# Patient Record
Sex: MALE | Race: BLACK OR AFRICAN AMERICAN | NOT HISPANIC OR LATINO | Employment: OTHER | URBAN - METROPOLITAN AREA
[De-identification: names, ages, dates, MRNs, and addresses within clinical notes are randomized per-mention and may not be internally consistent; named-entity substitution may affect disease eponyms.]

---

## 2023-01-01 ENCOUNTER — HOSPITAL ENCOUNTER (INPATIENT)
Facility: HOSPITAL | Age: 73
LOS: 5 days | Discharge: HOME | DRG: 919 | End: 2023-11-13
Attending: GENERAL PRACTICE | Admitting: INTERNAL MEDICINE
Payer: MEDICARE

## 2023-01-01 ENCOUNTER — OFFICE VISIT (OUTPATIENT)
Dept: PRIMARY CARE | Facility: CLINIC | Age: 73
End: 2023-01-01
Payer: MEDICARE

## 2023-01-01 ENCOUNTER — OFFICE VISIT (OUTPATIENT)
Dept: CARDIOLOGY | Facility: CLINIC | Age: 73
End: 2023-01-01
Payer: MEDICARE

## 2023-01-01 ENCOUNTER — PATIENT OUTREACH (OUTPATIENT)
Dept: PRIMARY CARE | Facility: CLINIC | Age: 73
End: 2023-01-01
Payer: MEDICARE

## 2023-01-01 ENCOUNTER — APPOINTMENT (OUTPATIENT)
Dept: PRIMARY CARE | Facility: CLINIC | Age: 73
End: 2023-01-01
Payer: MEDICARE

## 2023-01-01 ENCOUNTER — PATIENT OUTREACH (OUTPATIENT)
Dept: CARE COORDINATION | Facility: CLINIC | Age: 73
End: 2023-01-01
Payer: MEDICARE

## 2023-01-01 ENCOUNTER — LAB (OUTPATIENT)
Dept: LAB | Facility: LAB | Age: 73
End: 2023-01-01
Payer: MEDICARE

## 2023-01-01 ENCOUNTER — HOSPITAL ENCOUNTER (INPATIENT)
Facility: HOSPITAL | Age: 73
LOS: 4 days | DRG: 207 | End: 2023-11-27
Attending: EMERGENCY MEDICINE | Admitting: SURGERY
Payer: MEDICARE

## 2023-01-01 ENCOUNTER — APPOINTMENT (OUTPATIENT)
Dept: RADIOLOGY | Facility: HOSPITAL | Age: 73
DRG: 207 | End: 2023-01-01
Payer: MEDICARE

## 2023-01-01 ENCOUNTER — APPOINTMENT (OUTPATIENT)
Dept: CARDIOLOGY | Facility: CLINIC | Age: 73
End: 2023-01-01
Payer: MEDICARE

## 2023-01-01 ENCOUNTER — DOCUMENTATION (OUTPATIENT)
Dept: PRIMARY CARE | Facility: CLINIC | Age: 73
End: 2023-01-01
Payer: MEDICARE

## 2023-01-01 ENCOUNTER — APPOINTMENT (OUTPATIENT)
Dept: CARDIOLOGY | Facility: HOSPITAL | Age: 73
DRG: 919 | End: 2023-01-01
Payer: MEDICARE

## 2023-01-01 ENCOUNTER — TELEPHONE (OUTPATIENT)
Dept: PRIMARY CARE | Facility: CLINIC | Age: 73
End: 2023-01-01
Payer: MEDICARE

## 2023-01-01 ENCOUNTER — HOSPITAL ENCOUNTER (OUTPATIENT)
Dept: CARDIOLOGY | Facility: HOSPITAL | Age: 73
Discharge: HOME | End: 2023-11-17
Payer: MEDICARE

## 2023-01-01 ENCOUNTER — APPOINTMENT (OUTPATIENT)
Dept: RADIOLOGY | Facility: HOSPITAL | Age: 73
DRG: 919 | End: 2023-01-01
Payer: MEDICARE

## 2023-01-01 ENCOUNTER — HOSPITAL ENCOUNTER (OUTPATIENT)
Dept: CARDIOLOGY | Facility: CLINIC | Age: 73
Discharge: HOME | End: 2023-11-26
Payer: MEDICARE

## 2023-01-01 VITALS
OXYGEN SATURATION: 98 % | HEIGHT: 66 IN | WEIGHT: 203.26 LBS | RESPIRATION RATE: 20 BRPM | TEMPERATURE: 97.7 F | HEART RATE: 64 BPM | SYSTOLIC BLOOD PRESSURE: 140 MMHG | DIASTOLIC BLOOD PRESSURE: 47 MMHG | BODY MASS INDEX: 32.67 KG/M2

## 2023-01-01 VITALS
RESPIRATION RATE: 14 BRPM | BODY MASS INDEX: 31.62 KG/M2 | DIASTOLIC BLOOD PRESSURE: 51 MMHG | SYSTOLIC BLOOD PRESSURE: 127 MMHG | TEMPERATURE: 98.6 F | WEIGHT: 190.04 LBS | HEART RATE: 59 BPM | OXYGEN SATURATION: 71 %

## 2023-01-01 VITALS
OXYGEN SATURATION: 96 % | HEIGHT: 65 IN | BODY MASS INDEX: 32.09 KG/M2 | WEIGHT: 192.6 LBS | HEART RATE: 72 BPM | SYSTOLIC BLOOD PRESSURE: 127 MMHG | DIASTOLIC BLOOD PRESSURE: 54 MMHG | RESPIRATION RATE: 16 BRPM

## 2023-01-01 VITALS
SYSTOLIC BLOOD PRESSURE: 138 MMHG | HEART RATE: 87 BPM | HEIGHT: 65 IN | OXYGEN SATURATION: 96 % | DIASTOLIC BLOOD PRESSURE: 54 MMHG | BODY MASS INDEX: 30.82 KG/M2 | TEMPERATURE: 97.3 F | WEIGHT: 185 LBS | RESPIRATION RATE: 16 BRPM

## 2023-01-01 VITALS
SYSTOLIC BLOOD PRESSURE: 122 MMHG | HEART RATE: 82 BPM | RESPIRATION RATE: 16 BRPM | WEIGHT: 199.2 LBS | OXYGEN SATURATION: 91 % | HEIGHT: 65 IN | DIASTOLIC BLOOD PRESSURE: 64 MMHG | BODY MASS INDEX: 33.19 KG/M2

## 2023-01-01 VITALS
RESPIRATION RATE: 18 BRPM | HEART RATE: 93 BPM | DIASTOLIC BLOOD PRESSURE: 55 MMHG | BODY MASS INDEX: 30.16 KG/M2 | OXYGEN SATURATION: 92 % | WEIGHT: 181 LBS | TEMPERATURE: 97.9 F | HEIGHT: 65 IN | SYSTOLIC BLOOD PRESSURE: 129 MMHG

## 2023-01-01 VITALS
HEART RATE: 72 BPM | DIASTOLIC BLOOD PRESSURE: 62 MMHG | SYSTOLIC BLOOD PRESSURE: 110 MMHG | OXYGEN SATURATION: 94 % | BODY MASS INDEX: 32.65 KG/M2 | HEIGHT: 65 IN | WEIGHT: 196 LBS

## 2023-01-01 DIAGNOSIS — I44.1 SECOND DEGREE AV BLOCK: ICD-10-CM

## 2023-01-01 DIAGNOSIS — N40.0 BENIGN PROSTATIC HYPERPLASIA, UNSPECIFIED WHETHER LOWER URINARY TRACT SYMPTOMS PRESENT: ICD-10-CM

## 2023-01-01 DIAGNOSIS — I46.9 CARDIAC ARREST (MULTI): Primary | ICD-10-CM

## 2023-01-01 DIAGNOSIS — Z12.5 PROSTATE CANCER SCREENING: ICD-10-CM

## 2023-01-01 DIAGNOSIS — J81.1: ICD-10-CM

## 2023-01-01 DIAGNOSIS — N18.5 CKD STAGE 5 SECONDARY TO HYPERTENSION (MULTI): ICD-10-CM

## 2023-01-01 DIAGNOSIS — I50.32 CHRONIC DIASTOLIC HEART FAILURE (MULTI): Primary | ICD-10-CM

## 2023-01-01 DIAGNOSIS — E78.5 DYSLIPIDEMIA (HIGH LDL; LOW HDL): ICD-10-CM

## 2023-01-01 DIAGNOSIS — D64.9 ANEMIA, UNSPECIFIED TYPE: ICD-10-CM

## 2023-01-01 DIAGNOSIS — J81.0 ACUTE PULMONARY EDEMA (MULTI): ICD-10-CM

## 2023-01-01 DIAGNOSIS — I21.4 NSTEMI (NON-ST ELEVATED MYOCARDIAL INFARCTION) (MULTI): ICD-10-CM

## 2023-01-01 DIAGNOSIS — M10.9 GOUT, UNSPECIFIED CAUSE, UNSPECIFIED CHRONICITY, UNSPECIFIED SITE: ICD-10-CM

## 2023-01-01 DIAGNOSIS — I20.0 ANGINA PECTORIS, UNSTABLE (MULTI): Primary | ICD-10-CM

## 2023-01-01 DIAGNOSIS — I12.0 CKD STAGE 5 SECONDARY TO HYPERTENSION (MULTI): ICD-10-CM

## 2023-01-01 DIAGNOSIS — Z09 HOSPITAL DISCHARGE FOLLOW-UP: Primary | ICD-10-CM

## 2023-01-01 DIAGNOSIS — I50.43 CHF (CONGESTIVE HEART FAILURE), NYHA CLASS I, ACUTE ON CHRONIC, COMBINED (MULTI): ICD-10-CM

## 2023-01-01 DIAGNOSIS — Z00.00 MEDICARE ANNUAL WELLNESS VISIT, SUBSEQUENT: Primary | ICD-10-CM

## 2023-01-01 DIAGNOSIS — R06.02 SHORTNESS OF BREATH: ICD-10-CM

## 2023-01-01 DIAGNOSIS — H04.123 DRY EYES: Primary | ICD-10-CM

## 2023-01-01 DIAGNOSIS — R09.2 RESPIRATORY ARREST (MULTI): ICD-10-CM

## 2023-01-01 DIAGNOSIS — I35.1 AORTIC VALVE INSUFFICIENCY, ETIOLOGY OF CARDIAC VALVE DISEASE UNSPECIFIED: ICD-10-CM

## 2023-01-01 LAB
ALBUMIN SERPL BCP-MCNC: 2.4 G/DL (ref 3.4–5)
ALBUMIN SERPL BCP-MCNC: 2.6 G/DL (ref 3.4–5)
ALBUMIN SERPL BCP-MCNC: 2.7 G/DL (ref 3.4–5)
ALBUMIN SERPL BCP-MCNC: 3 G/DL (ref 3.4–5)
ALBUMIN SERPL BCP-MCNC: 3.1 G/DL (ref 3.4–5)
ALP SERPL-CCNC: 106 U/L (ref 33–136)
ALP SERPL-CCNC: 70 U/L (ref 33–136)
ALT SERPL W P-5'-P-CCNC: 11 U/L (ref 10–52)
ALT SERPL W P-5'-P-CCNC: 111 U/L (ref 10–52)
ANION GAP BLDA CALCULATED.4IONS-SCNC: 11 MMO/L (ref 10–25)
ANION GAP BLDA CALCULATED.4IONS-SCNC: 13 MMO/L (ref 10–25)
ANION GAP BLDA CALCULATED.4IONS-SCNC: 13 MMO/L (ref 10–25)
ANION GAP BLDA CALCULATED.4IONS-SCNC: 19 MMO/L (ref 10–25)
ANION GAP BLDV CALCULATED.4IONS-SCNC: 16 MMOL/L (ref 10–25)
ANION GAP BLDV CALCULATED.4IONS-SCNC: 17 MMOL/L (ref 10–25)
ANION GAP IN SER/PLAS: 21 MMOL/L (ref 10–20)
ANION GAP SERPL CALC-SCNC: 12 MMOL/L (ref 10–20)
ANION GAP SERPL CALC-SCNC: 15 MMOL/L (ref 10–20)
ANION GAP SERPL CALC-SCNC: 15 MMOL/L (ref 10–20)
ANION GAP SERPL CALC-SCNC: 17 MMOL/L (ref 10–20)
ANION GAP SERPL CALC-SCNC: 18 MMOL/L (ref 10–20)
ANION GAP SERPL CALC-SCNC: 18 MMOL/L (ref 10–20)
ANION GAP SERPL CALC-SCNC: 19 MMOL/L (ref 10–20)
ANION GAP SERPL CALC-SCNC: 20 MMOL/L (ref 10–20)
ANION GAP SERPL CALC-SCNC: 20 MMOL/L (ref 10–20)
ANION GAP SERPL CALC-SCNC: 21 MMOL/L (ref 10–20)
ANION GAP SERPL CALC-SCNC: 21 MMOL/L (ref 10–20)
ANION GAP SERPL CALC-SCNC: 27 MMOL/L (ref 10–20)
AST SERPL W P-5'-P-CCNC: 181 U/L (ref 9–39)
AST SERPL W P-5'-P-CCNC: 22 U/L (ref 9–39)
ATRIAL RATE: 81 BPM
BASE EXCESS BLDA CALC-SCNC: -0.8 MMOL/L (ref -2–3)
BASE EXCESS BLDA CALC-SCNC: -1 MMOL/L (ref -2–3)
BASE EXCESS BLDA CALC-SCNC: -5.3 MMOL/L (ref -2–3)
BASE EXCESS BLDA CALC-SCNC: 2.8 MMOL/L (ref -2–3)
BASE EXCESS BLDV CALC-SCNC: -2.7 MMOL/L (ref -2–3)
BASE EXCESS BLDV CALC-SCNC: -3.9 MMOL/L (ref -2–3)
BASOPHILS # BLD AUTO: 0.01 X10*3/UL (ref 0–0.1)
BASOPHILS # BLD AUTO: 0.01 X10*3/UL (ref 0–0.1)
BASOPHILS # BLD AUTO: 0.04 X10*3/UL (ref 0–0.1)
BASOPHILS # BLD AUTO: 0.05 X10*3/UL (ref 0–0.1)
BASOPHILS # BLD MANUAL: 0 X10*3/UL (ref 0–0.1)
BASOPHILS NFR BLD AUTO: 0.1 %
BASOPHILS NFR BLD AUTO: 0.1 %
BASOPHILS NFR BLD AUTO: 0.5 %
BASOPHILS NFR BLD AUTO: 0.7 %
BASOPHILS NFR BLD MANUAL: 0 %
BILIRUB DIRECT SERPL-MCNC: 0 MG/DL (ref 0–0.3)
BILIRUB SERPL-MCNC: 0.3 MG/DL (ref 0–1.2)
BILIRUB SERPL-MCNC: 0.3 MG/DL (ref 0–1.2)
BNP SERPL-MCNC: 1067 PG/ML (ref 0–99)
BNP SERPL-MCNC: 2327 PG/ML (ref 0–99)
BODY SURFACE AREA: 1.84 M2
BODY TEMPERATURE: 37 DEGREES CELSIUS
BUN SERPL-MCNC: 26 MG/DL (ref 6–23)
BUN SERPL-MCNC: 32 MG/DL (ref 6–23)
BUN SERPL-MCNC: 47 MG/DL (ref 6–23)
BUN SERPL-MCNC: 49 MG/DL (ref 6–23)
BUN SERPL-MCNC: 49 MG/DL (ref 6–23)
BUN SERPL-MCNC: 58 MG/DL (ref 6–23)
BUN SERPL-MCNC: 65 MG/DL (ref 6–23)
BUN SERPL-MCNC: 66 MG/DL (ref 6–23)
BUN SERPL-MCNC: 72 MG/DL (ref 6–23)
BUN SERPL-MCNC: 73 MG/DL (ref 6–23)
BUN SERPL-MCNC: 75 MG/DL (ref 6–23)
BUN SERPL-MCNC: 77 MG/DL (ref 6–23)
BUN SERPL-MCNC: 81 MG/DL (ref 6–23)
C DIF TOX TCDA+TCDB STL QL NAA+PROBE: NOT DETECTED
CA-I BLD-SCNC: 0.95 MMOL/L (ref 1.1–1.33)
CA-I BLD-SCNC: 0.96 MMOL/L (ref 1.1–1.33)
CA-I BLD-SCNC: 1.12 MMOL/L (ref 1.1–1.33)
CA-I BLD-SCNC: 1.18 MMOL/L (ref 1.1–1.33)
CA-I BLD-SCNC: 1.22 MMOL/L (ref 1.1–1.33)
CA-I BLDA-SCNC: 0.96 MMOL/L (ref 1.1–1.33)
CA-I BLDA-SCNC: 0.98 MMOL/L (ref 1.1–1.33)
CA-I BLDA-SCNC: 1 MMOL/L (ref 1.1–1.33)
CA-I BLDA-SCNC: 1.03 MMOL/L (ref 1.1–1.33)
CA-I BLDV-SCNC: 0.97 MMOL/L (ref 1.1–1.33)
CA-I BLDV-SCNC: 1 MMOL/L (ref 1.1–1.33)
CALCIUM (MG/DL) IN SER/PLAS: 8 MG/DL (ref 8.6–10.6)
CALCIUM SERPL-MCNC: 7.1 MG/DL (ref 8.6–10.3)
CALCIUM SERPL-MCNC: 7.3 MG/DL (ref 8.6–10.3)
CALCIUM SERPL-MCNC: 7.3 MG/DL (ref 8.6–10.3)
CALCIUM SERPL-MCNC: 7.5 MG/DL (ref 8.6–10.3)
CALCIUM SERPL-MCNC: 7.5 MG/DL (ref 8.6–10.3)
CALCIUM SERPL-MCNC: 7.7 MG/DL (ref 8.6–10.3)
CALCIUM SERPL-MCNC: 7.7 MG/DL (ref 8.6–10.3)
CALCIUM SERPL-MCNC: 7.8 MG/DL (ref 8.6–10.3)
CALCIUM SERPL-MCNC: 8.4 MG/DL (ref 8.6–10.3)
CARBON DIOXIDE, TOTAL (MMOL/L) IN SER/PLAS: 25 MMOL/L (ref 21–32)
CARDIAC TROPONIN I PNL SERPL HS: 110 NG/L (ref 0–20)
CARDIAC TROPONIN I PNL SERPL HS: 312 NG/L (ref 0–20)
CARDIAC TROPONIN I PNL SERPL HS: 444 NG/L (ref 0–20)
CARDIAC TROPONIN I PNL SERPL HS: 449 NG/L (ref 0–20)
CARDIAC TROPONIN I PNL SERPL HS: 472 NG/L (ref 0–20)
CARDIAC TROPONIN I PNL SERPL HS: 584 NG/L (ref 0–20)
CHLORIDE (MMOL/L) IN SER/PLAS: 100 MMOL/L (ref 98–107)
CHLORIDE BLDA-SCNC: 100 MMOL/L (ref 98–107)
CHLORIDE BLDA-SCNC: 102 MMOL/L (ref 98–107)
CHLORIDE BLDA-SCNC: 102 MMOL/L (ref 98–107)
CHLORIDE BLDA-SCNC: 99 MMOL/L (ref 98–107)
CHLORIDE BLDV-SCNC: 100 MMOL/L (ref 98–107)
CHLORIDE BLDV-SCNC: 103 MMOL/L (ref 98–107)
CHLORIDE SERPL-SCNC: 100 MMOL/L (ref 98–107)
CHLORIDE SERPL-SCNC: 100 MMOL/L (ref 98–107)
CHLORIDE SERPL-SCNC: 96 MMOL/L (ref 98–107)
CHLORIDE SERPL-SCNC: 97 MMOL/L (ref 98–107)
CHLORIDE SERPL-SCNC: 98 MMOL/L (ref 98–107)
CHLORIDE SERPL-SCNC: 99 MMOL/L (ref 98–107)
CHLORIDE SERPL-SCNC: 99 MMOL/L (ref 98–107)
CHOLESTEROL (MG/DL) IN SER/PLAS: 235 MG/DL (ref 0–199)
CHOLESTEROL IN HDL (MG/DL) IN SER/PLAS: 52 MG/DL
CHOLESTEROL/HDL RATIO: 4.5
CO2 SERPL-SCNC: 22 MMOL/L (ref 21–32)
CO2 SERPL-SCNC: 24 MMOL/L (ref 21–32)
CO2 SERPL-SCNC: 25 MMOL/L (ref 21–32)
CO2 SERPL-SCNC: 26 MMOL/L (ref 21–32)
CO2 SERPL-SCNC: 30 MMOL/L (ref 21–32)
CO2 SERPL-SCNC: 31 MMOL/L (ref 21–32)
CREAT SERPL-MCNC: 10.25 MG/DL (ref 0.5–1.3)
CREAT SERPL-MCNC: 11.45 MG/DL (ref 0.5–1.3)
CREAT SERPL-MCNC: 11.62 MG/DL (ref 0.5–1.3)
CREAT SERPL-MCNC: 12.14 MG/DL (ref 0.5–1.3)
CREAT SERPL-MCNC: 12.26 MG/DL (ref 0.5–1.3)
CREAT SERPL-MCNC: 13.02 MG/DL (ref 0.5–1.3)
CREAT SERPL-MCNC: 13.43 MG/DL (ref 0.5–1.3)
CREAT SERPL-MCNC: 15.29 MG/DL (ref 0.5–1.3)
CREAT SERPL-MCNC: 17.77 MG/DL (ref 0.5–1.3)
CREAT SERPL-MCNC: 18.05 MG/DL (ref 0.5–1.3)
CREAT SERPL-MCNC: 4.49 MG/DL (ref 0.5–1.3)
CREAT SERPL-MCNC: 4.9 MG/DL (ref 0.5–1.3)
CREAT SERPL-MCNC: 6.42 MG/DL (ref 0.5–1.3)
CREATININE (MG/DL) IN SER/PLAS: 13.67 MG/DL (ref 0.5–1.3)
D DIMER PPP FEU-MCNC: 3685 NG/ML FEU
DACRYOCYTES BLD QL SMEAR: ABNORMAL
EOSINOPHIL # BLD AUTO: 0.01 X10*3/UL (ref 0–0.4)
EOSINOPHIL # BLD AUTO: 0.05 X10*3/UL (ref 0–0.4)
EOSINOPHIL # BLD AUTO: 0.15 X10*3/UL (ref 0–0.4)
EOSINOPHIL # BLD AUTO: 0.17 X10*3/UL (ref 0–0.4)
EOSINOPHIL # BLD MANUAL: 0 X10*3/UL (ref 0–0.4)
EOSINOPHIL NFR BLD AUTO: 0.1 %
EOSINOPHIL NFR BLD AUTO: 0.7 %
EOSINOPHIL NFR BLD AUTO: 1.9 %
EOSINOPHIL NFR BLD AUTO: 2.4 %
EOSINOPHIL NFR BLD MANUAL: 0 %
ERYTHROCYTE DISTRIBUTION WIDTH (RATIO) BY AUTOMATED COUNT: 14.6 % (ref 11.5–14.5)
ERYTHROCYTE MEAN CORPUSCULAR HEMOGLOBIN CONCENTRATION (G/DL) BY AUTOMATED: 31.3 G/DL (ref 32–36)
ERYTHROCYTE MEAN CORPUSCULAR VOLUME (FL) BY AUTOMATED COUNT: 98 FL (ref 80–100)
ERYTHROCYTE [DISTWIDTH] IN BLOOD BY AUTOMATED COUNT: 14.5 % (ref 11.5–14.5)
ERYTHROCYTE [DISTWIDTH] IN BLOOD BY AUTOMATED COUNT: 14.5 % (ref 11.5–14.5)
ERYTHROCYTE [DISTWIDTH] IN BLOOD BY AUTOMATED COUNT: 14.6 % (ref 11.5–14.5)
ERYTHROCYTE [DISTWIDTH] IN BLOOD BY AUTOMATED COUNT: 14.7 % (ref 11.5–14.5)
ERYTHROCYTE [DISTWIDTH] IN BLOOD BY AUTOMATED COUNT: 14.8 % (ref 11.5–14.5)
ERYTHROCYTE [DISTWIDTH] IN BLOOD BY AUTOMATED COUNT: 14.8 % (ref 11.5–14.5)
ERYTHROCYTE [DISTWIDTH] IN BLOOD BY AUTOMATED COUNT: 14.9 % (ref 11.5–14.5)
ERYTHROCYTE [DISTWIDTH] IN BLOOD BY AUTOMATED COUNT: 15.7 % (ref 11.5–14.5)
ERYTHROCYTE [DISTWIDTH] IN BLOOD BY AUTOMATED COUNT: 15.8 % (ref 11.5–14.5)
ERYTHROCYTE [DISTWIDTH] IN BLOOD BY AUTOMATED COUNT: 16 % (ref 11.5–14.5)
ERYTHROCYTE [DISTWIDTH] IN BLOOD BY AUTOMATED COUNT: 16.2 % (ref 11.5–14.5)
ERYTHROCYTE [DISTWIDTH] IN BLOOD BY AUTOMATED COUNT: 16.6 % (ref 11.5–14.5)
ERYTHROCYTES (10*6/UL) IN BLOOD BY AUTOMATED COUNT: 2.77 X10E12/L (ref 4.5–5.9)
FLUAV RNA RESP QL NAA+PROBE: NOT DETECTED
FLUBV RNA RESP QL NAA+PROBE: NOT DETECTED
GFR MALE: 3 ML/MIN/1.73M2
GFR SERPL CREATININE-BSD FRML MDRD: 12 ML/MIN/1.73M*2
GFR SERPL CREATININE-BSD FRML MDRD: 13 ML/MIN/1.73M*2
GFR SERPL CREATININE-BSD FRML MDRD: 2 ML/MIN/1.73M*2
GFR SERPL CREATININE-BSD FRML MDRD: 2 ML/MIN/1.73M*2
GFR SERPL CREATININE-BSD FRML MDRD: 3 ML/MIN/1.73M*2
GFR SERPL CREATININE-BSD FRML MDRD: 3 ML/MIN/1.73M*2
GFR SERPL CREATININE-BSD FRML MDRD: 4 ML/MIN/1.73M*2
GFR SERPL CREATININE-BSD FRML MDRD: 5 ML/MIN/1.73M*2
GFR SERPL CREATININE-BSD FRML MDRD: 8 ML/MIN/1.73M*2
GLUCOSE (MG/DL) IN SER/PLAS: 83 MG/DL (ref 74–99)
GLUCOSE BLD MANUAL STRIP-MCNC: 128 MG/DL (ref 74–99)
GLUCOSE BLD MANUAL STRIP-MCNC: 139 MG/DL (ref 74–99)
GLUCOSE BLD MANUAL STRIP-MCNC: 141 MG/DL (ref 74–99)
GLUCOSE BLD MANUAL STRIP-MCNC: 141 MG/DL (ref 74–99)
GLUCOSE BLD MANUAL STRIP-MCNC: 147 MG/DL (ref 74–99)
GLUCOSE BLD MANUAL STRIP-MCNC: 61 MG/DL (ref 74–99)
GLUCOSE BLD MANUAL STRIP-MCNC: 71 MG/DL (ref 74–99)
GLUCOSE BLD MANUAL STRIP-MCNC: 73 MG/DL (ref 74–99)
GLUCOSE BLD MANUAL STRIP-MCNC: 74 MG/DL (ref 74–99)
GLUCOSE BLD MANUAL STRIP-MCNC: 78 MG/DL (ref 74–99)
GLUCOSE BLD MANUAL STRIP-MCNC: 79 MG/DL (ref 74–99)
GLUCOSE BLD MANUAL STRIP-MCNC: 80 MG/DL (ref 74–99)
GLUCOSE BLD MANUAL STRIP-MCNC: 82 MG/DL (ref 74–99)
GLUCOSE BLD MANUAL STRIP-MCNC: 83 MG/DL (ref 74–99)
GLUCOSE BLD MANUAL STRIP-MCNC: 84 MG/DL (ref 74–99)
GLUCOSE BLD MANUAL STRIP-MCNC: 84 MG/DL (ref 74–99)
GLUCOSE BLD MANUAL STRIP-MCNC: 86 MG/DL (ref 74–99)
GLUCOSE BLD MANUAL STRIP-MCNC: 87 MG/DL (ref 74–99)
GLUCOSE BLD MANUAL STRIP-MCNC: 87 MG/DL (ref 74–99)
GLUCOSE BLD MANUAL STRIP-MCNC: 88 MG/DL (ref 74–99)
GLUCOSE BLD MANUAL STRIP-MCNC: 90 MG/DL (ref 74–99)
GLUCOSE BLD MANUAL STRIP-MCNC: 95 MG/DL (ref 74–99)
GLUCOSE BLD MANUAL STRIP-MCNC: 96 MG/DL (ref 74–99)
GLUCOSE BLDA-MCNC: 103 MG/DL (ref 74–99)
GLUCOSE BLDA-MCNC: 143 MG/DL (ref 74–99)
GLUCOSE BLDA-MCNC: 227 MG/DL (ref 74–99)
GLUCOSE BLDA-MCNC: 73 MG/DL (ref 74–99)
GLUCOSE BLDV-MCNC: 126 MG/DL (ref 74–99)
GLUCOSE BLDV-MCNC: 176 MG/DL (ref 74–99)
GLUCOSE SERPL-MCNC: 105 MG/DL (ref 74–99)
GLUCOSE SERPL-MCNC: 109 MG/DL (ref 74–99)
GLUCOSE SERPL-MCNC: 123 MG/DL (ref 74–99)
GLUCOSE SERPL-MCNC: 158 MG/DL (ref 74–99)
GLUCOSE SERPL-MCNC: 80 MG/DL (ref 74–99)
GLUCOSE SERPL-MCNC: 90 MG/DL (ref 74–99)
GLUCOSE SERPL-MCNC: 91 MG/DL (ref 74–99)
GLUCOSE SERPL-MCNC: 92 MG/DL (ref 74–99)
GLUCOSE SERPL-MCNC: 95 MG/DL (ref 74–99)
HCO3 BLDA-SCNC: 21.6 MMOL/L (ref 22–26)
HCO3 BLDA-SCNC: 24.3 MMOL/L (ref 22–26)
HCO3 BLDA-SCNC: 27.2 MMOL/L (ref 22–26)
HCO3 BLDA-SCNC: 28.9 MMOL/L (ref 22–26)
HCO3 BLDV-SCNC: 24.4 MMOL/L (ref 22–26)
HCO3 BLDV-SCNC: 24.6 MMOL/L (ref 22–26)
HCT VFR BLD AUTO: 24.9 % (ref 41–52)
HCT VFR BLD AUTO: 25.3 % (ref 41–52)
HCT VFR BLD AUTO: 25.3 % (ref 41–52)
HCT VFR BLD AUTO: 25.6 % (ref 41–52)
HCT VFR BLD AUTO: 26.6 % (ref 41–52)
HCT VFR BLD AUTO: 26.7 % (ref 41–52)
HCT VFR BLD AUTO: 26.9 % (ref 41–52)
HCT VFR BLD AUTO: 27.9 % (ref 41–52)
HCT VFR BLD AUTO: 28.1 % (ref 41–52)
HCT VFR BLD AUTO: 29.8 % (ref 41–52)
HCT VFR BLD AUTO: 30 % (ref 41–52)
HCT VFR BLD AUTO: 30.5 % (ref 41–52)
HCT VFR BLD EST: 24 % (ref 41–52)
HCT VFR BLD EST: 25 % (ref 41–52)
HCT VFR BLD EST: 27 % (ref 41–52)
HCT VFR BLD EST: 28 % (ref 41–52)
HCT VFR BLD EST: 29 % (ref 41–52)
HCT VFR BLD EST: 34 % (ref 41–52)
HEMATOCRIT (%) IN BLOOD BY AUTOMATED COUNT: 27.2 % (ref 41–52)
HEMOGLOBIN (G/DL) IN BLOOD: 8.5 G/DL (ref 13.5–17.5)
HGB BLD-MCNC: 7.9 G/DL (ref 13.5–17.5)
HGB BLD-MCNC: 7.9 G/DL (ref 13.5–17.5)
HGB BLD-MCNC: 8.2 G/DL (ref 13.5–17.5)
HGB BLD-MCNC: 8.2 G/DL (ref 13.5–17.5)
HGB BLD-MCNC: 8.3 G/DL (ref 13.5–17.5)
HGB BLD-MCNC: 8.3 G/DL (ref 13.5–17.5)
HGB BLD-MCNC: 8.6 G/DL (ref 13.5–17.5)
HGB BLD-MCNC: 8.8 G/DL (ref 13.5–17.5)
HGB BLD-MCNC: 9.1 G/DL (ref 13.5–17.5)
HGB BLD-MCNC: 9.5 G/DL (ref 13.5–17.5)
HGB BLDA-MCNC: 11.4 G/DL (ref 13.5–17.5)
HGB BLDA-MCNC: 8.4 G/DL (ref 13.5–17.5)
HGB BLDA-MCNC: 8.9 G/DL (ref 13.5–17.5)
HGB BLDA-MCNC: 9.2 G/DL (ref 13.5–17.5)
HGB BLDV-MCNC: 8 G/DL (ref 13.5–17.5)
HGB BLDV-MCNC: 9.8 G/DL (ref 13.5–17.5)
HOLD SPECIMEN: NORMAL
HOLD SPECIMEN: NORMAL
IMM GRANULOCYTES # BLD AUTO: 0.02 X10*3/UL (ref 0–0.5)
IMM GRANULOCYTES # BLD AUTO: 0.03 X10*3/UL (ref 0–0.5)
IMM GRANULOCYTES # BLD AUTO: 0.15 X10*3/UL (ref 0–0.5)
IMM GRANULOCYTES NFR BLD AUTO: 0.3 % (ref 0–0.9)
IMM GRANULOCYTES NFR BLD AUTO: 0.4 % (ref 0–0.9)
IMM GRANULOCYTES NFR BLD AUTO: 0.4 % (ref 0–0.9)
IMM GRANULOCYTES NFR BLD AUTO: 0.5 % (ref 0–0.9)
IMM GRANULOCYTES NFR BLD AUTO: 2.1 % (ref 0–0.9)
INHALED O2 CONCENTRATION: 100 %
INHALED O2 CONCENTRATION: 100 %
INHALED O2 CONCENTRATION: 21 %
INHALED O2 CONCENTRATION: 60 %
INHALED O2 CONCENTRATION: 80 %
INHALED O2 CONCENTRATION: 80 %
INR PPP: 1.2 (ref 0.9–1.1)
LACTATE BLDA-SCNC: 1.4 MMOL/L (ref 0.4–2)
LACTATE BLDA-SCNC: 10.9 MMOL/L (ref 0.4–2)
LACTATE BLDA-SCNC: 2.5 MMOL/L (ref 0.4–2)
LACTATE BLDA-SCNC: 2.7 MMOL/L (ref 0.4–2)
LACTATE BLDV-SCNC: 1.2 MMOL/L (ref 0.4–2)
LACTATE BLDV-SCNC: 6.5 MMOL/L (ref 0.4–2)
LACTATE BLDV-SCNC: 6.5 MMOL/L (ref 0.4–2)
LACTATE BLDV-SCNC: 8.9 MMOL/L (ref 0.4–2)
LACTATE SERPL-SCNC: 7.3 MMOL/L (ref 0.4–2)
LDL: 156 MG/DL (ref 0–99)
LEUKOCYTES (10*3/UL) IN BLOOD BY AUTOMATED COUNT: 6.8 X10E9/L (ref 4.4–11.3)
LYMPHOCYTES # BLD AUTO: 0.5 X10*3/UL (ref 0.8–3)
LYMPHOCYTES # BLD AUTO: 0.5 X10*3/UL (ref 0.8–3)
LYMPHOCYTES # BLD AUTO: 0.99 X10*3/UL (ref 0.8–3)
LYMPHOCYTES # BLD AUTO: 1.86 X10*3/UL (ref 0.8–3)
LYMPHOCYTES # BLD MANUAL: 0.53 X10*3/UL (ref 0.8–3)
LYMPHOCYTES NFR BLD AUTO: 12.7 %
LYMPHOCYTES NFR BLD AUTO: 26.3 %
LYMPHOCYTES NFR BLD AUTO: 6 %
LYMPHOCYTES NFR BLD AUTO: 7.5 %
LYMPHOCYTES NFR BLD MANUAL: 9 %
MAGNESIUM SERPL-MCNC: 1.8 MG/DL (ref 1.6–2.4)
MCH RBC QN AUTO: 28.5 PG (ref 26–34)
MCH RBC QN AUTO: 28.6 PG (ref 26–34)
MCH RBC QN AUTO: 28.7 PG (ref 26–34)
MCH RBC QN AUTO: 28.7 PG (ref 26–34)
MCH RBC QN AUTO: 28.8 PG (ref 26–34)
MCH RBC QN AUTO: 28.9 PG (ref 26–34)
MCH RBC QN AUTO: 28.9 PG (ref 26–34)
MCH RBC QN AUTO: 29 PG (ref 26–34)
MCH RBC QN AUTO: 29.1 PG (ref 26–34)
MCH RBC QN AUTO: 29.2 PG (ref 26–34)
MCHC RBC AUTO-ENTMCNC: 29.3 G/DL (ref 32–36)
MCHC RBC AUTO-ENTMCNC: 30.5 G/DL (ref 32–36)
MCHC RBC AUTO-ENTMCNC: 30.8 G/DL (ref 32–36)
MCHC RBC AUTO-ENTMCNC: 30.9 G/DL (ref 32–36)
MCHC RBC AUTO-ENTMCNC: 31.1 G/DL (ref 32–36)
MCHC RBC AUTO-ENTMCNC: 31.1 G/DL (ref 32–36)
MCHC RBC AUTO-ENTMCNC: 31.2 G/DL (ref 32–36)
MCHC RBC AUTO-ENTMCNC: 31.2 G/DL (ref 32–36)
MCHC RBC AUTO-ENTMCNC: 31.3 G/DL (ref 32–36)
MCHC RBC AUTO-ENTMCNC: 31.5 G/DL (ref 32–36)
MCHC RBC AUTO-ENTMCNC: 32 G/DL (ref 32–36)
MCHC RBC AUTO-ENTMCNC: 34.5 G/DL (ref 32–36)
MCV RBC AUTO: 84 FL (ref 80–100)
MCV RBC AUTO: 91 FL (ref 80–100)
MCV RBC AUTO: 92 FL (ref 80–100)
MCV RBC AUTO: 92 FL (ref 80–100)
MCV RBC AUTO: 93 FL (ref 80–100)
MCV RBC AUTO: 95 FL (ref 80–100)
MCV RBC AUTO: 99 FL (ref 80–100)
MONOCYTES # BLD AUTO: 0.25 X10*3/UL (ref 0.05–0.8)
MONOCYTES # BLD AUTO: 0.25 X10*3/UL (ref 0.05–0.8)
MONOCYTES # BLD AUTO: 0.38 X10*3/UL (ref 0.05–0.8)
MONOCYTES # BLD AUTO: 0.51 X10*3/UL (ref 0.05–0.8)
MONOCYTES # BLD MANUAL: 0.41 X10*3/UL (ref 0.05–0.8)
MONOCYTES NFR BLD AUTO: 3 %
MONOCYTES NFR BLD AUTO: 3.7 %
MONOCYTES NFR BLD AUTO: 5.4 %
MONOCYTES NFR BLD AUTO: 6.5 %
MONOCYTES NFR BLD MANUAL: 7 %
MRSA DNA SPEC QL NAA+PROBE: NOT DETECTED
NEUTROPHILS # BLD AUTO: 4.47 X10*3/UL (ref 1.6–5.5)
NEUTROPHILS # BLD AUTO: 5.88 X10*3/UL (ref 1.6–5.5)
NEUTROPHILS # BLD AUTO: 6.08 X10*3/UL (ref 1.6–5.5)
NEUTROPHILS # BLD AUTO: 7.6 X10*3/UL (ref 1.6–5.5)
NEUTROPHILS # BLD MANUAL: 4.95 X10*3/UL (ref 1.6–5.5)
NEUTROPHILS NFR BLD AUTO: 63.1 %
NEUTROPHILS NFR BLD AUTO: 78 %
NEUTROPHILS NFR BLD AUTO: 87.7 %
NEUTROPHILS NFR BLD AUTO: 90.4 %
NEUTS BAND # BLD MANUAL: 1.65 X10*3/UL (ref 0–0.5)
NEUTS BAND NFR BLD MANUAL: 28 %
NEUTS SEG # BLD MANUAL: 3.3 X10*3/UL (ref 1.6–5)
NEUTS SEG NFR BLD MANUAL: 56 %
NRBC (PER 100 WBCS) BY AUTOMATED COUNT: 0 /100 WBC (ref 0–0)
NRBC BLD-RTO: 0 /100 WBCS (ref 0–0)
NRBC BLD-RTO: 0.8 /100 WBCS (ref 0–0)
NRBC BLD-RTO: 1.1 /100 WBCS (ref 0–0)
NRBC BLD-RTO: 1.9 /100 WBCS (ref 0–0)
OVALOCYTES BLD QL SMEAR: ABNORMAL
OXYHGB MFR BLDA: 84.4 % (ref 94–98)
OXYHGB MFR BLDA: 92.3 % (ref 94–98)
OXYHGB MFR BLDA: 96.6 % (ref 94–98)
OXYHGB MFR BLDA: 97 % (ref 94–98)
OXYHGB MFR BLDV: 56.3 % (ref 45–75)
OXYHGB MFR BLDV: 71.5 % (ref 45–75)
P AXIS: 72 DEGREES
P OFFSET: 154 MS
P ONSET: 112 MS
PCO2 BLDA: 40 MM HG (ref 38–42)
PCO2 BLDA: 42 MM HG (ref 38–42)
PCO2 BLDA: 47 MM HG (ref 38–42)
PCO2 BLDA: 81 MM HG (ref 38–42)
PCO2 BLDV: 56 MM HG (ref 41–51)
PCO2 BLDV: 61 MM HG (ref 41–51)
PEEP CMH2O: 10 CM H2O
PH BLDA: 7.16 PH (ref 7.38–7.42)
PH BLDA: 7.27 PH (ref 7.38–7.42)
PH BLDA: 7.37 PH (ref 7.38–7.42)
PH BLDA: 7.44 PH (ref 7.38–7.42)
PH BLDV: 7.21 PH (ref 7.33–7.43)
PH BLDV: 7.25 PH (ref 7.33–7.43)
PHOSPHATE SERPL-MCNC: 3.4 MG/DL (ref 2.5–4.9)
PHOSPHATE SERPL-MCNC: 5.5 MG/DL (ref 2.5–4.9)
PHOSPHATE SERPL-MCNC: 6.6 MG/DL (ref 2.5–4.9)
PHOSPHATE SERPL-MCNC: 9.6 MG/DL (ref 2.5–4.9)
PLATELET # BLD AUTO: 105 X10*3/UL (ref 150–450)
PLATELET # BLD AUTO: 132 X10*3/UL (ref 150–450)
PLATELET # BLD AUTO: 143 X10*3/UL (ref 150–450)
PLATELET # BLD AUTO: 148 X10*3/UL (ref 150–450)
PLATELET # BLD AUTO: 159 X10*3/UL (ref 150–450)
PLATELET # BLD AUTO: 176 X10*3/UL (ref 150–450)
PLATELET # BLD AUTO: 186 X10*3/UL (ref 150–450)
PLATELET # BLD AUTO: 197 X10*3/UL (ref 150–450)
PLATELET # BLD AUTO: 200 X10*3/UL (ref 150–450)
PLATELET # BLD AUTO: 206 X10*3/UL (ref 150–450)
PLATELET # BLD AUTO: 209 X10*3/UL (ref 150–450)
PLATELET # BLD AUTO: 213 X10*3/UL (ref 150–450)
PLATELET # BLD AUTO: 240 X10*3/UL (ref 150–450)
PLATELETS (10*3/UL) IN BLOOD AUTOMATED COUNT: 242 X10E9/L (ref 150–450)
PO2 BLDA: 123 MM HG (ref 85–95)
PO2 BLDA: 154 MM HG (ref 85–95)
PO2 BLDA: 57 MM HG (ref 85–95)
PO2 BLDA: 68 MM HG (ref 85–95)
PO2 BLDV: 48 MM HG (ref 35–45)
PO2 BLDV: 52 MM HG (ref 35–45)
POLYCHROMASIA BLD QL SMEAR: ABNORMAL
POTASSIUM (MMOL/L) IN SER/PLAS: 4.8 MMOL/L (ref 3.5–5.3)
POTASSIUM BLDA-SCNC: 3.4 MMOL/L (ref 3.5–5.3)
POTASSIUM BLDA-SCNC: 3.7 MMOL/L (ref 3.5–5.3)
POTASSIUM BLDA-SCNC: 4.8 MMOL/L (ref 3.5–5.3)
POTASSIUM BLDA-SCNC: 5.5 MMOL/L (ref 3.5–5.3)
POTASSIUM BLDV-SCNC: 5.8 MMOL/L (ref 3.5–5.3)
POTASSIUM BLDV-SCNC: 6 MMOL/L (ref 3.5–5.3)
POTASSIUM SERPL-SCNC: 3.4 MMOL/L (ref 3.5–5.3)
POTASSIUM SERPL-SCNC: 3.6 MMOL/L (ref 3.5–5.3)
POTASSIUM SERPL-SCNC: 3.6 MMOL/L (ref 3.5–5.3)
POTASSIUM SERPL-SCNC: 3.7 MMOL/L (ref 3.5–5.3)
POTASSIUM SERPL-SCNC: 3.8 MMOL/L (ref 3.5–5.3)
POTASSIUM SERPL-SCNC: 4 MMOL/L (ref 3.5–5.3)
POTASSIUM SERPL-SCNC: 4.1 MMOL/L (ref 3.5–5.3)
POTASSIUM SERPL-SCNC: 4.1 MMOL/L (ref 3.5–5.3)
POTASSIUM SERPL-SCNC: 4.3 MMOL/L (ref 3.5–5.3)
POTASSIUM SERPL-SCNC: 4.6 MMOL/L (ref 3.5–5.3)
POTASSIUM SERPL-SCNC: 4.8 MMOL/L (ref 3.5–5.3)
POTASSIUM SERPL-SCNC: 5.5 MMOL/L (ref 3.5–5.3)
PR INTERVAL: 168 MS
PROSTATE SPECIFIC ANTIGEN,SCREEN: 2.58 NG/ML (ref 0–4)
PROT SERPL-MCNC: 5.3 G/DL (ref 6.4–8.2)
PROT SERPL-MCNC: 6.2 G/DL (ref 6.4–8.2)
PROTHROMBIN TIME: 13.1 SECONDS (ref 9.8–12.8)
Q ONSET: 196 MS
QRS COUNT: 14 BEATS
QRS DURATION: 194 MS
QT INTERVAL: 512 MS
QTC CALCULATION(BAZETT): 594 MS
QTC FREDERICIA: 565 MS
R AXIS: -62 DEGREES
RBC # BLD AUTO: 2.77 X10*6/UL (ref 4.5–5.9)
RBC # BLD AUTO: 2.77 X10*6/UL (ref 4.5–5.9)
RBC # BLD AUTO: 2.82 X10*6/UL (ref 4.5–5.9)
RBC # BLD AUTO: 2.87 X10*6/UL (ref 4.5–5.9)
RBC # BLD AUTO: 2.88 X10*6/UL (ref 4.5–5.9)
RBC # BLD AUTO: 2.89 X10*6/UL (ref 4.5–5.9)
RBC # BLD AUTO: 2.95 X10*6/UL (ref 4.5–5.9)
RBC # BLD AUTO: 3.03 X10*6/UL (ref 4.5–5.9)
RBC # BLD AUTO: 3.05 X10*6/UL (ref 4.5–5.9)
RBC # BLD AUTO: 3.09 X10*6/UL (ref 4.5–5.9)
RBC # BLD AUTO: 3.15 X10*6/UL (ref 4.5–5.9)
RBC # BLD AUTO: 3.31 X10*6/UL (ref 4.5–5.9)
RBC MORPH BLD: ABNORMAL
SAO2 % BLDA: 86 % (ref 94–100)
SAO2 % BLDA: 94 % (ref 94–100)
SAO2 % BLDA: 98 % (ref 94–100)
SAO2 % BLDA: 98 % (ref 94–100)
SAO2 % BLDV: 57 % (ref 45–75)
SAO2 % BLDV: 73 % (ref 45–75)
SARS-COV-2 RNA RESP QL NAA+PROBE: NOT DETECTED
SODIUM (MMOL/L) IN SER/PLAS: 141 MMOL/L (ref 136–145)
SODIUM BLDA-SCNC: 130 MMOL/L (ref 136–145)
SODIUM BLDA-SCNC: 134 MMOL/L (ref 136–145)
SODIUM BLDA-SCNC: 137 MMOL/L (ref 136–145)
SODIUM BLDA-SCNC: 143 MMOL/L (ref 136–145)
SODIUM BLDV-SCNC: 136 MMOL/L (ref 136–145)
SODIUM BLDV-SCNC: 138 MMOL/L (ref 136–145)
SODIUM SERPL-SCNC: 135 MMOL/L (ref 136–145)
SODIUM SERPL-SCNC: 136 MMOL/L (ref 136–145)
SODIUM SERPL-SCNC: 137 MMOL/L (ref 136–145)
SODIUM SERPL-SCNC: 138 MMOL/L (ref 136–145)
SODIUM SERPL-SCNC: 140 MMOL/L (ref 136–145)
SODIUM SERPL-SCNC: 141 MMOL/L (ref 136–145)
T AXIS: 109 DEGREES
T OFFSET: 452 MS
TARGETS BLD QL SMEAR: ABNORMAL
TIDAL VOLUME: 500 ML
TIDAL VOLUME: 500 ML
TOTAL CELLS COUNTED BLD: 100
TRIGLYCERIDE (MG/DL) IN SER/PLAS: 134 MG/DL (ref 0–149)
UFH PPP CHRO-ACNC: 0.2 IU/ML
UFH PPP CHRO-ACNC: 0.2 IU/ML
UFH PPP CHRO-ACNC: 0.3 IU/ML
UFH PPP CHRO-ACNC: 0.4 IU/ML
UFH PPP CHRO-ACNC: 0.4 IU/ML
UFH PPP CHRO-ACNC: 0.5 IU/ML
UFH PPP CHRO-ACNC: 0.6 IU/ML
UFH PPP CHRO-ACNC: 0.7 IU/ML
UFH PPP CHRO-ACNC: <0.1 IU/ML
URATE (MG/DL) IN SER/PLAS: 7 MG/DL (ref 4–7.5)
UREA NITROGEN (MG/DL) IN SER/PLAS: 88 MG/DL (ref 6–23)
VENTILATOR MODE: ABNORMAL
VENTILATOR MODE: ABNORMAL
VENTILATOR RATE: 12 BPM
VENTILATOR RATE: 12 BPM
VENTRICULAR RATE: 81 BPM
VLDL: 27 MG/DL (ref 0–40)
WBC # BLD AUTO: 3.2 X10*3/UL (ref 4.4–11.3)
WBC # BLD AUTO: 5.6 X10*3/UL (ref 4.4–11.3)
WBC # BLD AUTO: 5.7 X10*3/UL (ref 4.4–11.3)
WBC # BLD AUTO: 5.9 X10*3/UL (ref 4.4–11.3)
WBC # BLD AUTO: 5.9 X10*3/UL (ref 4.4–11.3)
WBC # BLD AUTO: 6.2 X10*3/UL (ref 4.4–11.3)
WBC # BLD AUTO: 6.2 X10*3/UL (ref 4.4–11.3)
WBC # BLD AUTO: 6.6 X10*3/UL (ref 4.4–11.3)
WBC # BLD AUTO: 6.7 X10*3/UL (ref 4.4–11.3)
WBC # BLD AUTO: 7.1 X10*3/UL (ref 4.4–11.3)
WBC # BLD AUTO: 7.8 X10*3/UL (ref 4.4–11.3)
WBC # BLD AUTO: 8.4 X10*3/UL (ref 4.4–11.3)

## 2023-01-01 PROCEDURE — 84100 ASSAY OF PHOSPHORUS: CPT | Performed by: INTERNAL MEDICINE

## 2023-01-01 PROCEDURE — 2500000004 HC RX 250 GENERAL PHARMACY W/ HCPCS (ALT 636 FOR OP/ED): Performed by: INTERNAL MEDICINE

## 2023-01-01 PROCEDURE — 3074F SYST BP LT 130 MM HG: CPT | Performed by: FAMILY MEDICINE

## 2023-01-01 PROCEDURE — 2500000004 HC RX 250 GENERAL PHARMACY W/ HCPCS (ALT 636 FOR OP/ED): Performed by: SURGERY

## 2023-01-01 PROCEDURE — 2500000004 HC RX 250 GENERAL PHARMACY W/ HCPCS (ALT 636 FOR OP/ED): Performed by: REGISTERED NURSE

## 2023-01-01 PROCEDURE — 82947 ASSAY GLUCOSE BLOOD QUANT: CPT

## 2023-01-01 PROCEDURE — 93296 REM INTERROG EVL PM/IDS: CPT

## 2023-01-01 PROCEDURE — 1036F TOBACCO NON-USER: CPT | Performed by: FAMILY MEDICINE

## 2023-01-01 PROCEDURE — 84550 ASSAY OF BLOOD/URIC ACID: CPT

## 2023-01-01 PROCEDURE — 2500000001 HC RX 250 WO HCPCS SELF ADMINISTERED DRUGS (ALT 637 FOR MEDICARE OP): Performed by: PHARMACIST

## 2023-01-01 PROCEDURE — 85027 COMPLETE CBC AUTOMATED: CPT | Performed by: GENERAL PRACTICE

## 2023-01-01 PROCEDURE — 94681 O2 UPTK CO2 OUTP % O2 XTRC: CPT

## 2023-01-01 PROCEDURE — 36600 WITHDRAWAL OF ARTERIAL BLOOD: CPT

## 2023-01-01 PROCEDURE — 36556 INSERT NON-TUNNEL CV CATH: CPT | Mod: ZK | Performed by: PHYSICIAN ASSISTANT

## 2023-01-01 PROCEDURE — 1160F RVW MEDS BY RX/DR IN RCRD: CPT | Performed by: FAMILY MEDICINE

## 2023-01-01 PROCEDURE — 2500000005 HC RX 250 GENERAL PHARMACY W/O HCPCS: Performed by: PHYSICIAN ASSISTANT

## 2023-01-01 PROCEDURE — 87493 C DIFF AMPLIFIED PROBE: CPT | Mod: AHULAB | Performed by: NURSE PRACTITIONER

## 2023-01-01 PROCEDURE — 93010 ELECTROCARDIOGRAM REPORT: CPT | Performed by: STUDENT IN AN ORGANIZED HEALTH CARE EDUCATION/TRAINING PROGRAM

## 2023-01-01 PROCEDURE — 2020000001 HC ICU ROOM DAILY

## 2023-01-01 PROCEDURE — 85520 HEPARIN ASSAY: CPT | Performed by: GENERAL PRACTICE

## 2023-01-01 PROCEDURE — 82435 ASSAY OF BLOOD CHLORIDE: CPT | Performed by: INTERNAL MEDICINE

## 2023-01-01 PROCEDURE — 71045 X-RAY EXAM CHEST 1 VIEW: CPT | Performed by: STUDENT IN AN ORGANIZED HEALTH CARE EDUCATION/TRAINING PROGRAM

## 2023-01-01 PROCEDURE — 1126F AMNT PAIN NOTED NONE PRSNT: CPT

## 2023-01-01 PROCEDURE — 3078F DIAST BP <80 MM HG: CPT | Performed by: FAMILY MEDICINE

## 2023-01-01 PROCEDURE — 6350000001 HC RX 635 EPOETIN >10,000 UNITS: Mod: JZ | Performed by: INTERNAL MEDICINE

## 2023-01-01 PROCEDURE — 85049 AUTOMATED PLATELET COUNT: CPT | Performed by: SURGERY

## 2023-01-01 PROCEDURE — 82330 ASSAY OF CALCIUM: CPT | Performed by: INTERNAL MEDICINE

## 2023-01-01 PROCEDURE — 85520 HEPARIN ASSAY: CPT | Performed by: INTERNAL MEDICINE

## 2023-01-01 PROCEDURE — 94003 VENT MGMT INPAT SUBQ DAY: CPT

## 2023-01-01 PROCEDURE — 93010 ELECTROCARDIOGRAM REPORT: CPT | Performed by: INTERNAL MEDICINE

## 2023-01-01 PROCEDURE — 84484 ASSAY OF TROPONIN QUANT: CPT | Performed by: GENERAL PRACTICE

## 2023-01-01 PROCEDURE — 80048 BASIC METABOLIC PNL TOTAL CA: CPT | Performed by: NURSE PRACTITIONER

## 2023-01-01 PROCEDURE — 99222 1ST HOSP IP/OBS MODERATE 55: CPT | Performed by: INTERNAL MEDICINE

## 2023-01-01 PROCEDURE — 1200000002 HC GENERAL ROOM WITH TELEMETRY DAILY

## 2023-01-01 PROCEDURE — 71046 X-RAY EXAM CHEST 2 VIEWS: CPT | Performed by: RADIOLOGY

## 2023-01-01 PROCEDURE — 84484 ASSAY OF TROPONIN QUANT: CPT | Performed by: EMERGENCY MEDICINE

## 2023-01-01 PROCEDURE — 37799 UNLISTED PX VASCULAR SURGERY: CPT | Performed by: INTERNAL MEDICINE

## 2023-01-01 PROCEDURE — 85025 COMPLETE CBC W/AUTO DIFF WBC: CPT | Performed by: EMERGENCY MEDICINE

## 2023-01-01 PROCEDURE — 1036F TOBACCO NON-USER: CPT

## 2023-01-01 PROCEDURE — 2550000001 HC RX 255 CONTRASTS

## 2023-01-01 PROCEDURE — 0BH17EZ INSERTION OF ENDOTRACHEAL AIRWAY INTO TRACHEA, VIA NATURAL OR ARTIFICIAL OPENING: ICD-10-PCS | Performed by: EMERGENCY MEDICINE

## 2023-01-01 PROCEDURE — 99231 SBSQ HOSP IP/OBS SF/LOW 25: CPT | Performed by: INTERNAL MEDICINE

## 2023-01-01 PROCEDURE — 85027 COMPLETE CBC AUTOMATED: CPT | Performed by: INTERNAL MEDICINE

## 2023-01-01 PROCEDURE — 99291 CRITICAL CARE FIRST HOUR: CPT | Performed by: SURGERY

## 2023-01-01 PROCEDURE — 80053 COMPREHEN METABOLIC PANEL: CPT | Performed by: GENERAL PRACTICE

## 2023-01-01 PROCEDURE — 82330 ASSAY OF CALCIUM: CPT | Performed by: NURSE PRACTITIONER

## 2023-01-01 PROCEDURE — 3075F SYST BP GE 130 - 139MM HG: CPT | Performed by: FAMILY MEDICINE

## 2023-01-01 PROCEDURE — 85025 COMPLETE CBC W/AUTO DIFF WBC: CPT | Performed by: INTERNAL MEDICINE

## 2023-01-01 PROCEDURE — 82565 ASSAY OF CREATININE: CPT | Performed by: INTERNAL MEDICINE

## 2023-01-01 PROCEDURE — 36415 COLL VENOUS BLD VENIPUNCTURE: CPT | Performed by: INTERNAL MEDICINE

## 2023-01-01 PROCEDURE — 96372 THER/PROPH/DIAG INJ SC/IM: CPT | Performed by: PHYSICIAN ASSISTANT

## 2023-01-01 PROCEDURE — 36415 COLL VENOUS BLD VENIPUNCTURE: CPT

## 2023-01-01 PROCEDURE — 84100 ASSAY OF PHOSPHORUS: CPT | Performed by: EMERGENCY MEDICINE

## 2023-01-01 PROCEDURE — 84132 ASSAY OF SERUM POTASSIUM: CPT | Performed by: EMERGENCY MEDICINE

## 2023-01-01 PROCEDURE — 87636 SARSCOV2 & INF A&B AMP PRB: CPT | Performed by: GENERAL PRACTICE

## 2023-01-01 PROCEDURE — 71045 X-RAY EXAM CHEST 1 VIEW: CPT | Mod: FY

## 2023-01-01 PROCEDURE — 36415 COLL VENOUS BLD VENIPUNCTURE: CPT | Performed by: GENERAL PRACTICE

## 2023-01-01 PROCEDURE — 82310 ASSAY OF CALCIUM: CPT | Performed by: INTERNAL MEDICINE

## 2023-01-01 PROCEDURE — 90937 HEMODIALYSIS REPEATED EVAL: CPT

## 2023-01-01 PROCEDURE — 2500000001 HC RX 250 WO HCPCS SELF ADMINISTERED DRUGS (ALT 637 FOR MEDICARE OP): Performed by: RADIOLOGY

## 2023-01-01 PROCEDURE — 2500000001 HC RX 250 WO HCPCS SELF ADMINISTERED DRUGS (ALT 637 FOR MEDICARE OP): Performed by: INTERNAL MEDICINE

## 2023-01-01 PROCEDURE — 36556 INSERT NON-TUNNEL CV CATH: CPT | Performed by: EMERGENCY MEDICINE

## 2023-01-01 PROCEDURE — 2500000002 HC RX 250 W HCPCS SELF ADMINISTERED DRUGS (ALT 637 FOR MEDICARE OP, ALT 636 FOR OP/ED): Performed by: NURSE PRACTITIONER

## 2023-01-01 PROCEDURE — 82374 ASSAY BLOOD CARBON DIOXIDE: CPT | Performed by: NURSE PRACTITIONER

## 2023-01-01 PROCEDURE — 87640 STAPH A DNA AMP PROBE: CPT

## 2023-01-01 PROCEDURE — 85018 HEMOGLOBIN: CPT | Performed by: EMERGENCY MEDICINE

## 2023-01-01 PROCEDURE — 99232 SBSQ HOSP IP/OBS MODERATE 35: CPT | Performed by: INTERNAL MEDICINE

## 2023-01-01 PROCEDURE — 75574 CT ANGIO HRT W/3D IMAGE: CPT | Performed by: RADIOLOGY

## 2023-01-01 PROCEDURE — 1126F AMNT PAIN NOTED NONE PRSNT: CPT | Performed by: FAMILY MEDICINE

## 2023-01-01 PROCEDURE — 99233 SBSQ HOSP IP/OBS HIGH 50: CPT | Performed by: STUDENT IN AN ORGANIZED HEALTH CARE EDUCATION/TRAINING PROGRAM

## 2023-01-01 PROCEDURE — C9113 INJ PANTOPRAZOLE SODIUM, VIA: HCPCS | Performed by: INTERNAL MEDICINE

## 2023-01-01 PROCEDURE — 5A1D90Z PERFORMANCE OF URINARY FILTRATION, CONTINUOUS, GREATER THAN 18 HOURS PER DAY: ICD-10-PCS | Performed by: INTERNAL MEDICINE

## 2023-01-01 PROCEDURE — 1111F DSCHRG MED/CURRENT MED MERGE: CPT | Performed by: FAMILY MEDICINE

## 2023-01-01 PROCEDURE — 1159F MED LIST DOCD IN RCRD: CPT | Performed by: FAMILY MEDICINE

## 2023-01-01 PROCEDURE — 36620 INSERTION CATHETER ARTERY: CPT | Mod: ZK | Performed by: PHYSICIAN ASSISTANT

## 2023-01-01 PROCEDURE — G0439 PPPS, SUBSEQ VISIT: HCPCS | Performed by: FAMILY MEDICINE

## 2023-01-01 PROCEDURE — 3E1M39Z IRRIGATION OF PERITONEAL CAVITY USING DIALYSATE, PERCUTANEOUS APPROACH: ICD-10-PCS | Performed by: INTERNAL MEDICINE

## 2023-01-01 PROCEDURE — 85025 COMPLETE CBC W/AUTO DIFF WBC: CPT | Performed by: GENERAL PRACTICE

## 2023-01-01 PROCEDURE — 83605 ASSAY OF LACTIC ACID: CPT | Performed by: SURGERY

## 2023-01-01 PROCEDURE — 2500000005 HC RX 250 GENERAL PHARMACY W/O HCPCS: Performed by: SURGERY

## 2023-01-01 PROCEDURE — 1111F DSCHRG MED/CURRENT MED MERGE: CPT

## 2023-01-01 PROCEDURE — 83880 ASSAY OF NATRIURETIC PEPTIDE: CPT | Performed by: GENERAL PRACTICE

## 2023-01-01 PROCEDURE — 99223 1ST HOSP IP/OBS HIGH 75: CPT | Performed by: STUDENT IN AN ORGANIZED HEALTH CARE EDUCATION/TRAINING PROGRAM

## 2023-01-01 PROCEDURE — 90945 DIALYSIS ONE EVALUATION: CPT | Performed by: INTERNAL MEDICINE

## 2023-01-01 PROCEDURE — 31500 INSERT EMERGENCY AIRWAY: CPT | Performed by: EMERGENCY MEDICINE

## 2023-01-01 PROCEDURE — 87636 SARSCOV2 & INF A&B AMP PRB: CPT | Performed by: EMERGENCY MEDICINE

## 2023-01-01 PROCEDURE — 36415 COLL VENOUS BLD VENIPUNCTURE: CPT | Performed by: EMERGENCY MEDICINE

## 2023-01-01 PROCEDURE — 93294 REM INTERROG EVL PM/LDLS PM: CPT | Performed by: INTERNAL MEDICINE

## 2023-01-01 PROCEDURE — 5A1955Z RESPIRATORY VENTILATION, GREATER THAN 96 CONSECUTIVE HOURS: ICD-10-PCS | Performed by: EMERGENCY MEDICINE

## 2023-01-01 PROCEDURE — 2500000004 HC RX 250 GENERAL PHARMACY W/ HCPCS (ALT 636 FOR OP/ED): Performed by: GENERAL PRACTICE

## 2023-01-01 PROCEDURE — 85610 PROTHROMBIN TIME: CPT | Performed by: EMERGENCY MEDICINE

## 2023-01-01 PROCEDURE — 99291 CRITICAL CARE FIRST HOUR: CPT | Performed by: NURSE PRACTITIONER

## 2023-01-01 PROCEDURE — 93279 PRGRMG DEV EVAL PM/LDLS PM: CPT

## 2023-01-01 PROCEDURE — 84520 ASSAY OF UREA NITROGEN: CPT | Performed by: INTERNAL MEDICINE

## 2023-01-01 PROCEDURE — 2500000001 HC RX 250 WO HCPCS SELF ADMINISTERED DRUGS (ALT 637 FOR MEDICARE OP): Performed by: NURSE PRACTITIONER

## 2023-01-01 PROCEDURE — 2500000005 HC RX 250 GENERAL PHARMACY W/O HCPCS: Performed by: EMERGENCY MEDICINE

## 2023-01-01 PROCEDURE — 2500000004 HC RX 250 GENERAL PHARMACY W/ HCPCS (ALT 636 FOR OP/ED)

## 2023-01-01 PROCEDURE — 99232 SBSQ HOSP IP/OBS MODERATE 35: CPT | Performed by: STUDENT IN AN ORGANIZED HEALTH CARE EDUCATION/TRAINING PROGRAM

## 2023-01-01 PROCEDURE — 82435 ASSAY OF BLOOD CHLORIDE: CPT | Performed by: NURSE PRACTITIONER

## 2023-01-01 PROCEDURE — 36556 INSERT NON-TUNNEL CV CATH: CPT | Performed by: PHYSICIAN ASSISTANT

## 2023-01-01 PROCEDURE — 5A12012 PERFORMANCE OF CARDIAC OUTPUT, SINGLE, MANUAL: ICD-10-PCS | Performed by: EMERGENCY MEDICINE

## 2023-01-01 PROCEDURE — 83735 ASSAY OF MAGNESIUM: CPT | Performed by: EMERGENCY MEDICINE

## 2023-01-01 PROCEDURE — 99212 OFFICE O/P EST SF 10 MIN: CPT | Performed by: FAMILY MEDICINE

## 2023-01-01 PROCEDURE — 3078F DIAST BP <80 MM HG: CPT

## 2023-01-01 PROCEDURE — 71046 X-RAY EXAM CHEST 2 VIEWS: CPT | Mod: FY

## 2023-01-01 PROCEDURE — 71045 X-RAY EXAM CHEST 1 VIEW: CPT | Performed by: RADIOLOGY

## 2023-01-01 PROCEDURE — 84484 ASSAY OF TROPONIN QUANT: CPT

## 2023-01-01 PROCEDURE — 36620 INSERTION CATHETER ARTERY: CPT | Performed by: PHYSICIAN ASSISTANT

## 2023-01-01 PROCEDURE — 85520 HEPARIN ASSAY: CPT | Mod: 91 | Performed by: GENERAL PRACTICE

## 2023-01-01 PROCEDURE — 71045 X-RAY EXAM CHEST 1 VIEW: CPT

## 2023-01-01 PROCEDURE — 80051 ELECTROLYTE PANEL: CPT | Performed by: INTERNAL MEDICINE

## 2023-01-01 PROCEDURE — 99214 OFFICE O/P EST MOD 30 MIN: CPT

## 2023-01-01 PROCEDURE — 85027 COMPLETE CBC AUTOMATED: CPT

## 2023-01-01 PROCEDURE — 36415 COLL VENOUS BLD VENIPUNCTURE: CPT | Performed by: NURSE PRACTITIONER

## 2023-01-01 PROCEDURE — 05HN33Z INSERTION OF INFUSION DEVICE INTO LEFT INTERNAL JUGULAR VEIN, PERCUTANEOUS APPROACH: ICD-10-PCS | Performed by: EMERGENCY MEDICINE

## 2023-01-01 PROCEDURE — 3E033XZ INTRODUCTION OF VASOPRESSOR INTO PERIPHERAL VEIN, PERCUTANEOUS APPROACH: ICD-10-PCS | Performed by: SURGERY

## 2023-01-01 PROCEDURE — 80069 RENAL FUNCTION PANEL: CPT | Performed by: INTERNAL MEDICINE

## 2023-01-01 PROCEDURE — 85379 FIBRIN DEGRADATION QUANT: CPT | Performed by: EMERGENCY MEDICINE

## 2023-01-01 PROCEDURE — 82805 BLOOD GASES W/O2 SATURATION: CPT | Performed by: NURSE PRACTITIONER

## 2023-01-01 PROCEDURE — 93005 ELECTROCARDIOGRAM TRACING: CPT

## 2023-01-01 PROCEDURE — 94799 UNLISTED PULMONARY SVC/PX: CPT

## 2023-01-01 PROCEDURE — 99495 TRANSJ CARE MGMT MOD F2F 14D: CPT | Performed by: FAMILY MEDICINE

## 2023-01-01 PROCEDURE — 92950 HEART/LUNG RESUSCITATION CPR: CPT

## 2023-01-01 PROCEDURE — 2500000005 HC RX 250 GENERAL PHARMACY W/O HCPCS: Performed by: REGISTERED NURSE

## 2023-01-01 PROCEDURE — 96374 THER/PROPH/DIAG INJ IV PUSH: CPT

## 2023-01-01 PROCEDURE — 96372 THER/PROPH/DIAG INJ SC/IM: CPT | Performed by: INTERNAL MEDICINE

## 2023-01-01 PROCEDURE — 83605 ASSAY OF LACTIC ACID: CPT | Performed by: EMERGENCY MEDICINE

## 2023-01-01 PROCEDURE — 82435 ASSAY OF BLOOD CHLORIDE: CPT | Performed by: SURGERY

## 2023-01-01 PROCEDURE — 75574 CT ANGIO HRT W/3D IMAGE: CPT

## 2023-01-01 PROCEDURE — 99214 OFFICE O/P EST MOD 30 MIN: CPT | Performed by: FAMILY MEDICINE

## 2023-01-01 PROCEDURE — 2500000004 HC RX 250 GENERAL PHARMACY W/ HCPCS (ALT 636 FOR OP/ED): Performed by: PHYSICIAN ASSISTANT

## 2023-01-01 PROCEDURE — 99291 CRITICAL CARE FIRST HOUR: CPT | Mod: 25 | Performed by: GENERAL PRACTICE

## 2023-01-01 PROCEDURE — 1159F MED LIST DOCD IN RCRD: CPT

## 2023-01-01 PROCEDURE — 80048 BASIC METABOLIC PNL TOTAL CA: CPT

## 2023-01-01 PROCEDURE — 83880 ASSAY OF NATRIURETIC PEPTIDE: CPT | Performed by: EMERGENCY MEDICINE

## 2023-01-01 PROCEDURE — 82947 ASSAY GLUCOSE BLOOD QUANT: CPT | Performed by: INTERNAL MEDICINE

## 2023-01-01 PROCEDURE — 3E033XZ INTRODUCTION OF VASOPRESSOR INTO PERIPHERAL VEIN, PERCUTANEOUS APPROACH: ICD-10-PCS | Performed by: EMERGENCY MEDICINE

## 2023-01-01 PROCEDURE — 2500000004 HC RX 250 GENERAL PHARMACY W/ HCPCS (ALT 636 FOR OP/ED): Performed by: NURSE PRACTITIONER

## 2023-01-01 PROCEDURE — 85007 BL SMEAR W/DIFF WBC COUNT: CPT | Performed by: INTERNAL MEDICINE

## 2023-01-01 PROCEDURE — 99291 CRITICAL CARE FIRST HOUR: CPT | Mod: 25 | Performed by: EMERGENCY MEDICINE

## 2023-01-01 PROCEDURE — 1160F RVW MEDS BY RX/DR IN RCRD: CPT

## 2023-01-01 PROCEDURE — 3074F SYST BP LT 130 MM HG: CPT

## 2023-01-01 PROCEDURE — 85027 COMPLETE CBC AUTOMATED: CPT | Mod: 91 | Performed by: INTERNAL MEDICINE

## 2023-01-01 PROCEDURE — 2500000004 HC RX 250 GENERAL PHARMACY W/ HCPCS (ALT 636 FOR OP/ED): Performed by: EMERGENCY MEDICINE

## 2023-01-01 PROCEDURE — 82248 BILIRUBIN DIRECT: CPT | Performed by: EMERGENCY MEDICINE

## 2023-01-01 PROCEDURE — A4217 STERILE WATER/SALINE, 500 ML: HCPCS

## 2023-01-01 PROCEDURE — 85027 COMPLETE CBC AUTOMATED: CPT | Performed by: NURSE PRACTITIONER

## 2023-01-01 PROCEDURE — 94640 AIRWAY INHALATION TREATMENT: CPT

## 2023-01-01 PROCEDURE — 94002 VENT MGMT INPAT INIT DAY: CPT

## 2023-01-01 PROCEDURE — 2500000001 HC RX 250 WO HCPCS SELF ADMINISTERED DRUGS (ALT 637 FOR MEDICARE OP)

## 2023-01-01 PROCEDURE — 80061 LIPID PANEL: CPT

## 2023-01-01 PROCEDURE — 84153 ASSAY OF PSA TOTAL: CPT

## 2023-01-01 RX ORDER — METOPROLOL TARTRATE 1 MG/ML
5 INJECTION, SOLUTION INTRAVENOUS ONCE AS NEEDED
Status: DISCONTINUED | OUTPATIENT
Start: 2023-01-01 | End: 2023-01-01

## 2023-01-01 RX ORDER — SODIUM BICARBONATE 650 MG/1
650 TABLET ORAL 2 TIMES DAILY
Status: DISCONTINUED | OUTPATIENT
Start: 2023-01-01 | End: 2023-01-01

## 2023-01-01 RX ORDER — HEPARIN SODIUM 1000 [USP'U]/ML
1200 INJECTION, SOLUTION INTRAVENOUS; SUBCUTANEOUS
OUTPATIENT
Start: 2023-01-01

## 2023-01-01 RX ORDER — ONDANSETRON HYDROCHLORIDE 2 MG/ML
4 INJECTION, SOLUTION INTRAVENOUS EVERY 8 HOURS PRN
Status: DISCONTINUED | OUTPATIENT
Start: 2023-01-01 | End: 2023-01-01 | Stop reason: HOSPADM

## 2023-01-01 RX ORDER — POLYETHYLENE GLYCOL 3350 17 G/17G
17 POWDER, FOR SOLUTION ORAL DAILY
Status: DISCONTINUED | OUTPATIENT
Start: 2023-01-01 | End: 2023-01-01 | Stop reason: HOSPADM

## 2023-01-01 RX ORDER — PROPOFOL 10 MG/ML
5-50 INJECTION, EMULSION INTRAVENOUS CONTINUOUS
Status: DISCONTINUED | OUTPATIENT
Start: 2023-01-01 | End: 2023-01-01 | Stop reason: HOSPADM

## 2023-01-01 RX ORDER — PANTOPRAZOLE SODIUM 40 MG/10ML
40 INJECTION, POWDER, LYOPHILIZED, FOR SOLUTION INTRAVENOUS
Status: DISCONTINUED | OUTPATIENT
Start: 2023-01-01 | End: 2023-01-01 | Stop reason: HOSPADM

## 2023-01-01 RX ORDER — INSULIN LISPRO 100 [IU]/ML
0-5 INJECTION, SOLUTION INTRAVENOUS; SUBCUTANEOUS EVERY 4 HOURS
Status: DISCONTINUED | OUTPATIENT
Start: 2023-01-01 | End: 2023-01-01

## 2023-01-01 RX ORDER — SODIUM BICARBONATE 650 MG/1
650 TABLET ORAL 2 TIMES DAILY
COMMUNITY

## 2023-01-01 RX ORDER — METOPROLOL TARTRATE 50 MG/1
100 TABLET ORAL ONCE
Status: COMPLETED | OUTPATIENT
Start: 2023-01-01 | End: 2023-01-01

## 2023-01-01 RX ORDER — METOPROLOL TARTRATE 1 MG/ML
5 INJECTION, SOLUTION INTRAVENOUS ONCE
Status: DISCONTINUED | OUTPATIENT
Start: 2023-01-01 | End: 2023-01-01

## 2023-01-01 RX ORDER — AMLODIPINE BESYLATE 10 MG/1
10 TABLET ORAL DAILY
Status: DISCONTINUED | OUTPATIENT
Start: 2023-01-01 | End: 2023-01-01 | Stop reason: HOSPADM

## 2023-01-01 RX ORDER — EPINEPHRINE 0.1 MG/ML
INJECTION INTRACARDIAC; INTRAVENOUS CODE/TRAUMA/SEDATION MEDICATION
Status: COMPLETED | OUTPATIENT
Start: 2023-01-01 | End: 2023-01-01

## 2023-01-01 RX ORDER — DEXTROSE MONOHYDRATE AND SODIUM CHLORIDE 5; .9 G/100ML; G/100ML
30 INJECTION, SOLUTION INTRAVENOUS CONTINUOUS
Status: DISCONTINUED | OUTPATIENT
Start: 2023-01-01 | End: 2023-01-01

## 2023-01-01 RX ORDER — SEVELAMER CARBONATE 800 MG/1
800 TABLET, FILM COATED ORAL 4 TIMES DAILY
Status: DISCONTINUED | OUTPATIENT
Start: 2023-01-01 | End: 2023-01-01 | Stop reason: HOSPADM

## 2023-01-01 RX ORDER — HEPARIN SODIUM,PORCINE/PF 10 UNIT/ML
12 SYRINGE (ML) INTRAVENOUS AS NEEDED
Status: DISCONTINUED | OUTPATIENT
Start: 2023-01-01 | End: 2023-01-01 | Stop reason: HOSPADM

## 2023-01-01 RX ORDER — EPINEPHRINE HCL IN DEXTROSE 5% 4MG/250ML
.01-1 PLASTIC BAG, INJECTION (ML) INTRAVENOUS CONTINUOUS
Status: DISCONTINUED | OUTPATIENT
Start: 2023-01-01 | End: 2023-01-01

## 2023-01-01 RX ORDER — DOCUSATE SODIUM 100 MG/1
100 CAPSULE, LIQUID FILLED ORAL 2 TIMES DAILY PRN
Status: DISCONTINUED | OUTPATIENT
Start: 2023-01-01 | End: 2023-01-01 | Stop reason: HOSPADM

## 2023-01-01 RX ORDER — AMLODIPINE BESYLATE 10 MG/1
TABLET ORAL
COMMUNITY
Start: 2022-07-19

## 2023-01-01 RX ORDER — FUROSEMIDE 10 MG/ML
80 INJECTION INTRAMUSCULAR; INTRAVENOUS ONCE
Status: COMPLETED | OUTPATIENT
Start: 2023-01-01 | End: 2023-01-01

## 2023-01-01 RX ORDER — IRON ASPGLY,PS/C/B12/FA/CA/SUC 150-25-1
CAPSULE ORAL
COMMUNITY
Start: 2022-09-20 | End: 2023-01-01 | Stop reason: SDUPTHER

## 2023-01-01 RX ORDER — ALBUTEROL SULFATE 0.83 MG/ML
2.5 SOLUTION RESPIRATORY (INHALATION) EVERY 6 HOURS PRN
Status: DISCONTINUED | OUTPATIENT
Start: 2023-01-01 | End: 2023-01-01 | Stop reason: HOSPADM

## 2023-01-01 RX ORDER — HEPARIN SODIUM 5000 [USP'U]/ML
2000-4000 INJECTION, SOLUTION INTRAVENOUS; SUBCUTANEOUS EVERY 4 HOURS PRN
Status: DISCONTINUED | OUTPATIENT
Start: 2023-01-01 | End: 2023-01-01

## 2023-01-01 RX ORDER — NOREPINEPHRINE BITARTRATE/D5W 8 MG/250ML
.01-.5 PLASTIC BAG, INJECTION (ML) INTRAVENOUS CONTINUOUS
Status: DISCONTINUED | OUTPATIENT
Start: 2023-01-01 | End: 2023-01-01 | Stop reason: HOSPADM

## 2023-01-01 RX ORDER — ERGOCALCIFEROL 1.25 MG/1
50000 CAPSULE ORAL
Status: DISCONTINUED | OUTPATIENT
Start: 2023-01-01 | End: 2023-01-01

## 2023-01-01 RX ORDER — TORSEMIDE 20 MG/1
20 TABLET ORAL 2 TIMES DAILY
Qty: 60 TABLET | Refills: 3 | Status: SHIPPED | OUTPATIENT
Start: 2023-01-01 | End: 2023-11-28

## 2023-01-01 RX ORDER — HEPARIN SODIUM 1000 [USP'U]/ML
2000 INJECTION, SOLUTION INTRAVENOUS; SUBCUTANEOUS
OUTPATIENT
Start: 2023-01-01

## 2023-01-01 RX ORDER — TORSEMIDE 20 MG/1
TABLET ORAL
COMMUNITY
Start: 2021-05-10 | End: 2023-01-01 | Stop reason: SDUPTHER

## 2023-01-01 RX ORDER — CALCIUM GLUCONATE 20 MG/ML
1 INJECTION, SOLUTION INTRAVENOUS ONCE
Status: COMPLETED | OUTPATIENT
Start: 2023-01-01 | End: 2023-01-01

## 2023-01-01 RX ORDER — PANTOPRAZOLE SODIUM 40 MG/1
TABLET, DELAYED RELEASE ORAL
Status: ON HOLD | COMMUNITY
Start: 2021-04-14 | End: 2023-01-01 | Stop reason: WASHOUT

## 2023-01-01 RX ORDER — ACETAMINOPHEN 325 MG/1
TABLET ORAL
COMMUNITY
Start: 2022-06-13

## 2023-01-01 RX ORDER — DOCUSATE SODIUM 100 MG/1
CAPSULE, LIQUID FILLED ORAL
COMMUNITY

## 2023-01-01 RX ORDER — DEXTROSE MONOHYDRATE 100 MG/ML
0.3 INJECTION, SOLUTION INTRAVENOUS ONCE AS NEEDED
Status: DISCONTINUED | OUTPATIENT
Start: 2023-01-01 | End: 2023-01-01

## 2023-01-01 RX ORDER — HEPARIN SODIUM 5000 [USP'U]/ML
5000 INJECTION, SOLUTION INTRAVENOUS; SUBCUTANEOUS EVERY 8 HOURS
Status: DISCONTINUED | OUTPATIENT
Start: 2023-01-01 | End: 2023-01-01

## 2023-01-01 RX ORDER — SEVELAMER CARBONATE 800 MG/1
TABLET, FILM COATED ORAL
COMMUNITY

## 2023-01-01 RX ORDER — ALLOPURINOL 100 MG/1
TABLET ORAL
Status: ON HOLD | COMMUNITY
Start: 2015-09-11 | End: 2023-01-01 | Stop reason: SDUPTHER

## 2023-01-01 RX ORDER — TALC
3 POWDER (GRAM) TOPICAL DAILY
Status: DISCONTINUED | OUTPATIENT
Start: 2023-01-01 | End: 2023-01-01 | Stop reason: HOSPADM

## 2023-01-01 RX ORDER — BISACODYL 5 MG
5 TABLET, DELAYED RELEASE (ENTERIC COATED) ORAL DAILY PRN
Status: DISCONTINUED | OUTPATIENT
Start: 2023-01-01 | End: 2023-01-01 | Stop reason: HOSPADM

## 2023-01-01 RX ORDER — ACETAMINOPHEN 325 MG/1
650 TABLET ORAL EVERY 4 HOURS PRN
Status: DISCONTINUED | OUTPATIENT
Start: 2023-01-01 | End: 2023-01-01 | Stop reason: HOSPADM

## 2023-01-01 RX ORDER — VANCOMYCIN HYDROCHLORIDE 1 G/20ML
INJECTION, POWDER, LYOPHILIZED, FOR SOLUTION INTRAVENOUS DAILY PRN
Status: DISCONTINUED | OUTPATIENT
Start: 2023-01-01 | End: 2023-01-01

## 2023-01-01 RX ORDER — NITROGLYCERIN 0.4 MG/1
0.8 TABLET SUBLINGUAL ONCE
Status: COMPLETED | OUTPATIENT
Start: 2023-01-01 | End: 2023-01-01

## 2023-01-01 RX ORDER — LORAZEPAM 2 MG/ML
0.5 INJECTION INTRAMUSCULAR EVERY 5 MIN PRN
Status: DISCONTINUED | OUTPATIENT
Start: 2023-01-01 | End: 2023-01-01 | Stop reason: HOSPADM

## 2023-01-01 RX ORDER — ALLOPURINOL 100 MG/1
100 TABLET ORAL DAILY
COMMUNITY
Start: 2023-01-01

## 2023-01-01 RX ORDER — LIDOCAINE HYDROCHLORIDE AND EPINEPHRINE 10; 10 MG/ML; UG/ML
INJECTION, SOLUTION INFILTRATION; PERINEURAL
Status: DISPENSED
Start: 2023-01-01 | End: 2023-01-01

## 2023-01-01 RX ORDER — HEPARIN SODIUM 5000 [USP'U]/ML
4000 INJECTION, SOLUTION INTRAVENOUS; SUBCUTANEOUS ONCE
Status: COMPLETED | OUTPATIENT
Start: 2023-01-01 | End: 2023-01-01

## 2023-01-01 RX ORDER — ALBUTEROL SULFATE 90 UG/1
2 AEROSOL, METERED RESPIRATORY (INHALATION) EVERY 4 HOURS PRN
COMMUNITY

## 2023-01-01 RX ORDER — FERROUS SULFATE 325(65) MG
325 TABLET ORAL
COMMUNITY

## 2023-01-01 RX ORDER — ERGOCALCIFEROL 1.25 MG/1
50000 CAPSULE ORAL
Status: ON HOLD | COMMUNITY
Start: 2021-04-12 | End: 2023-01-01 | Stop reason: WASHOUT

## 2023-01-01 RX ORDER — ASPIRIN 81 MG/1
81 TABLET ORAL DAILY
Status: DISCONTINUED | OUTPATIENT
Start: 2023-01-01 | End: 2023-01-01 | Stop reason: HOSPADM

## 2023-01-01 RX ORDER — CALCITRIOL 0.5 UG/1
CAPSULE ORAL
COMMUNITY
Start: 2022-09-20

## 2023-01-01 RX ORDER — ACETAMINOPHEN 650 MG/1
650 SUPPOSITORY RECTAL EVERY 4 HOURS PRN
Status: DISCONTINUED | OUTPATIENT
Start: 2023-01-01 | End: 2023-01-01 | Stop reason: HOSPADM

## 2023-01-01 RX ORDER — ONDANSETRON 4 MG/1
4 TABLET, FILM COATED ORAL EVERY 8 HOURS PRN
Status: DISCONTINUED | OUTPATIENT
Start: 2023-01-01 | End: 2023-01-01 | Stop reason: HOSPADM

## 2023-01-01 RX ORDER — ALLOPURINOL 100 MG/1
100 TABLET ORAL 2 TIMES DAILY
Status: ON HOLD | COMMUNITY
Start: 2021-04-12 | End: 2023-01-01 | Stop reason: DRUGHIGH

## 2023-01-01 RX ORDER — ALBUTEROL SULFATE 90 UG/1
2 AEROSOL, METERED RESPIRATORY (INHALATION) EVERY 4 HOURS PRN
Status: DISCONTINUED | OUTPATIENT
Start: 2023-01-01 | End: 2023-01-01

## 2023-01-01 RX ORDER — GUAIFENESIN 600 MG/1
600 TABLET, EXTENDED RELEASE ORAL EVERY 12 HOURS PRN
Status: DISCONTINUED | OUTPATIENT
Start: 2023-01-01 | End: 2023-01-01 | Stop reason: HOSPADM

## 2023-01-01 RX ORDER — HEPARIN SODIUM 1000 [USP'U]/ML
4.5 INJECTION, SOLUTION INTRAVENOUS; SUBCUTANEOUS CONTINUOUS
Status: DISCONTINUED | OUTPATIENT
Start: 2023-01-01 | End: 2023-01-01

## 2023-01-01 RX ORDER — ATORVASTATIN CALCIUM 40 MG/1
40 TABLET, FILM COATED ORAL NIGHTLY
Status: DISCONTINUED | OUTPATIENT
Start: 2023-01-01 | End: 2023-01-01 | Stop reason: HOSPADM

## 2023-01-01 RX ORDER — ACETAMINOPHEN 325 MG/1
650 TABLET ORAL EVERY 4 HOURS PRN
Status: DISCONTINUED | OUTPATIENT
Start: 2023-01-01 | End: 2023-01-01

## 2023-01-01 RX ORDER — HEPARIN SODIUM 10000 [USP'U]/100ML
0-4000 INJECTION, SOLUTION INTRAVENOUS CONTINUOUS
Status: DISCONTINUED | OUTPATIENT
Start: 2023-01-01 | End: 2023-01-01

## 2023-01-01 RX ORDER — LACOSAMIDE 10 MG/ML
100 INJECTION, SOLUTION INTRAVENOUS ONCE
Status: DISCONTINUED | OUTPATIENT
Start: 2023-01-01 | End: 2023-01-01

## 2023-01-01 RX ORDER — MULTIVIT-MIN/IRON FUM/FOLIC AC 7.5 MG-4
1 TABLET ORAL DAILY
Status: DISCONTINUED | OUTPATIENT
Start: 2023-01-01 | End: 2023-01-01 | Stop reason: HOSPADM

## 2023-01-01 RX ORDER — LEVETIRACETAM 15 MG/ML
1500 INJECTION INTRAVASCULAR ONCE
Status: COMPLETED | OUTPATIENT
Start: 2023-01-01 | End: 2023-01-01

## 2023-01-01 RX ORDER — DEXTROSE 50 % IN WATER (D50W) INTRAVENOUS SYRINGE
12.5 ONCE
Status: COMPLETED | OUTPATIENT
Start: 2023-01-01 | End: 2023-01-01

## 2023-01-01 RX ORDER — INDOMETHACIN 25 MG/1
CAPSULE ORAL CODE/TRAUMA/SEDATION MEDICATION
Status: COMPLETED | OUTPATIENT
Start: 2023-01-01 | End: 2023-01-01

## 2023-01-01 RX ORDER — POLYETHYLENE GLYCOL 3350 17 G/17G
POWDER, FOR SOLUTION ORAL
COMMUNITY
Start: 2022-09-20

## 2023-01-01 RX ORDER — LEVETIRACETAM 5 MG/ML
500 INJECTION INTRAVASCULAR EVERY 12 HOURS
Status: DISCONTINUED | OUTPATIENT
Start: 2023-01-01 | End: 2023-01-01

## 2023-01-01 RX ORDER — LABETALOL 100 MG/1
2 TABLET, FILM COATED ORAL 2 TIMES DAILY
Status: DISCONTINUED | OUTPATIENT
Start: 2023-01-01 | End: 2023-01-01 | Stop reason: HOSPADM

## 2023-01-01 RX ORDER — HEPARIN SODIUM 1000 [USP'U]/ML
INJECTION, SOLUTION INTRAVENOUS; SUBCUTANEOUS
Status: COMPLETED
Start: 2023-01-01 | End: 2023-01-01

## 2023-01-01 RX ORDER — ERGOCALCIFEROL 1.25 MG/1
1250 CAPSULE ORAL
Status: DISCONTINUED | OUTPATIENT
Start: 2023-01-01 | End: 2023-01-01 | Stop reason: HOSPADM

## 2023-01-01 RX ORDER — ERGOCALCIFEROL 1.25 MG/1
CAPSULE ORAL
COMMUNITY

## 2023-01-01 RX ORDER — METOPROLOL TARTRATE 50 MG/1
100 TABLET ORAL ONCE AS NEEDED
Status: DISCONTINUED | OUTPATIENT
Start: 2023-01-01 | End: 2023-01-01

## 2023-01-01 RX ORDER — ACETAMINOPHEN 160 MG/5ML
650 SOLUTION ORAL EVERY 4 HOURS PRN
Status: DISCONTINUED | OUTPATIENT
Start: 2023-01-01 | End: 2023-01-01 | Stop reason: HOSPADM

## 2023-01-01 RX ORDER — ALLOPURINOL 100 MG/1
100 TABLET ORAL DAILY
Status: DISCONTINUED | OUTPATIENT
Start: 2023-01-01 | End: 2023-01-01 | Stop reason: HOSPADM

## 2023-01-01 RX ORDER — NITROGLYCERIN 0.4 MG/1
0.8 TABLET SUBLINGUAL ONCE
Status: CANCELLED | OUTPATIENT
Start: 2023-01-01

## 2023-01-01 RX ORDER — IPRATROPIUM BROMIDE AND ALBUTEROL SULFATE 2.5; .5 MG/3ML; MG/3ML
3 SOLUTION RESPIRATORY (INHALATION)
Status: DISCONTINUED | OUTPATIENT
Start: 2023-01-01 | End: 2023-01-01

## 2023-01-01 RX ORDER — ACETAMINOPHEN 160 MG/5ML
650 SOLUTION ORAL EVERY 4 HOURS PRN
Status: DISCONTINUED | OUTPATIENT
Start: 2023-01-01 | End: 2023-01-01

## 2023-01-01 RX ORDER — LACOSAMIDE 10 MG/ML
100 INJECTION, SOLUTION INTRAVENOUS 2 TIMES DAILY
Status: DISCONTINUED | OUTPATIENT
Start: 2023-01-01 | End: 2023-01-01

## 2023-01-01 RX ORDER — DEXTROSE 50 % IN WATER (D50W) INTRAVENOUS SYRINGE
25
Status: DISCONTINUED | OUTPATIENT
Start: 2023-01-01 | End: 2023-01-01

## 2023-01-01 RX ORDER — HYDRALAZINE HYDROCHLORIDE 100 MG/1
TABLET, FILM COATED ORAL
COMMUNITY

## 2023-01-01 RX ORDER — IRON ASPGLY,PS/C/B12/FA/CA/SUC 150-25-1
1 CAPSULE ORAL DAILY
Qty: 90 EACH | Refills: 1 | Status: SHIPPED | OUTPATIENT
Start: 2023-01-01 | End: 2023-01-01

## 2023-01-01 RX ORDER — LABETALOL 200 MG/1
TABLET, FILM COATED ORAL
COMMUNITY
Start: 2021-05-24

## 2023-01-01 RX ORDER — ATORVASTATIN CALCIUM 40 MG/1
TABLET, FILM COATED ORAL
COMMUNITY
Start: 2022-07-19

## 2023-01-01 RX ORDER — PANTOPRAZOLE SODIUM 40 MG/1
40 TABLET, DELAYED RELEASE ORAL
Status: DISCONTINUED | OUTPATIENT
Start: 2023-01-01 | End: 2023-01-01

## 2023-01-01 RX ORDER — PANTOPRAZOLE SODIUM 40 MG/1
40 TABLET, DELAYED RELEASE ORAL 2 TIMES DAILY
Status: DISCONTINUED | OUTPATIENT
Start: 2023-01-01 | End: 2023-01-01

## 2023-01-01 RX ORDER — NOREPINEPHRINE BITARTRATE/D5W 8 MG/250ML
PLASTIC BAG, INJECTION (ML) INTRAVENOUS
Status: COMPLETED | OUTPATIENT
Start: 2023-01-01 | End: 2023-01-01

## 2023-01-01 RX ORDER — ASPIRIN 81 MG/1
TABLET ORAL
COMMUNITY
Start: 2018-10-09

## 2023-01-01 RX ORDER — HEPARIN SODIUM 5000 [USP'U]/ML
1000 INJECTION, SOLUTION INTRAVENOUS; SUBCUTANEOUS AS NEEDED
Status: DISCONTINUED | OUTPATIENT
Start: 2023-01-01 | End: 2023-01-01

## 2023-01-01 RX ORDER — TORSEMIDE 20 MG/1
20 TABLET ORAL 2 TIMES DAILY
Status: ON HOLD | COMMUNITY
End: 2023-01-01 | Stop reason: WASHOUT

## 2023-01-01 RX ADMIN — LABETALOL HYDROCHLORIDE 200 MG: 100 TABLET, FILM COATED ORAL at 21:46

## 2023-01-01 RX ADMIN — CALCIUM GLUCONATE 1 G: 20 INJECTION, SOLUTION INTRAVENOUS at 21:30

## 2023-01-01 RX ADMIN — PROPOFOL 20 MCG/KG/MIN: 10 INJECTION, EMULSION INTRAVENOUS at 16:05

## 2023-01-01 RX ADMIN — MULTIPLE VITAMINS W/ MINERALS TAB 1 TABLET: TAB at 11:05

## 2023-01-01 RX ADMIN — SEVELAMER CARBONATE 800 MG: 800 TABLET, FILM COATED ORAL at 12:42

## 2023-01-01 RX ADMIN — HEPARIN SODIUM 5000 UNITS: 5000 INJECTION INTRAVENOUS; SUBCUTANEOUS at 17:49

## 2023-01-01 RX ADMIN — CALCIUM POLYCARBOPHIL 625 MG: 625 TABLET ORAL at 08:34

## 2023-01-01 RX ADMIN — PROPOFOL 40 MCG/KG/MIN: 10 INJECTION, EMULSION INTRAVENOUS at 04:17

## 2023-01-01 RX ADMIN — CALCIUM CHLORIDE, MAGNESIUM CHLORIDE, DEXTROSE MONOHYDRATE, LACTIC ACID, SODIUM CHLORIDE, SODIUM BICARBONATE AND POTASSIUM CHLORIDE 25 ML/KG/HR: 5.15; 2.03; 22; 5.4; 6.46; 3.09; .157 INJECTION INTRAVENOUS at 20:51

## 2023-01-01 RX ADMIN — VASOPRESSIN 0.03 UNITS/MIN: 0.2 INJECTION INTRAVENOUS at 06:00

## 2023-01-01 RX ADMIN — CALCIUM CHLORIDE, MAGNESIUM CHLORIDE, DEXTROSE MONOHYDRATE, LACTIC ACID, SODIUM CHLORIDE, SODIUM BICARBONATE AND POTASSIUM CHLORIDE 30 ML/KG/HR: 5.15; 2.03; 22; 5.4; 6.46; 3.09; .157 INJECTION INTRAVENOUS at 23:43

## 2023-01-01 RX ADMIN — VANCOMYCIN HYDROCHLORIDE 1250 MG: 10 INJECTION, POWDER, LYOPHILIZED, FOR SOLUTION INTRAVENOUS at 07:31

## 2023-01-01 RX ADMIN — CALCIUM CHLORIDE, MAGNESIUM CHLORIDE, DEXTROSE MONOHYDRATE, LACTIC ACID, SODIUM CHLORIDE, SODIUM BICARBONATE AND POTASSIUM CHLORIDE 30 ML/KG/HR: 5.15; 2.03; 22; 5.4; 6.46; 3.09; .157 INJECTION INTRAVENOUS at 15:07

## 2023-01-01 RX ADMIN — SEVELAMER CARBONATE 800 MG: 800 TABLET, FILM COATED ORAL at 21:54

## 2023-01-01 RX ADMIN — ATORVASTATIN CALCIUM 40 MG: 40 TABLET, FILM COATED ORAL at 21:54

## 2023-01-01 RX ADMIN — HEPARIN SODIUM 5000 UNITS: 5000 INJECTION INTRAVENOUS; SUBCUTANEOUS at 07:54

## 2023-01-01 RX ADMIN — ALLOPURINOL 100 MG: 100 TABLET ORAL at 10:18

## 2023-01-01 RX ADMIN — ATORVASTATIN CALCIUM 40 MG: 40 TABLET, FILM COATED ORAL at 21:46

## 2023-01-01 RX ADMIN — Medication 3 MG: at 21:14

## 2023-01-01 RX ADMIN — METOPROLOL TARTRATE 100 MG: 50 TABLET, FILM COATED ORAL at 08:29

## 2023-01-01 RX ADMIN — CALCIUM POLYCARBOPHIL 625 MG: 625 TABLET ORAL at 10:07

## 2023-01-01 RX ADMIN — SEVELAMER CARBONATE 800 MG: 800 TABLET, FILM COATED ORAL at 06:16

## 2023-01-01 RX ADMIN — CALCIUM CHLORIDE, MAGNESIUM CHLORIDE, DEXTROSE MONOHYDRATE, LACTIC ACID, SODIUM CHLORIDE, SODIUM BICARBONATE AND POTASSIUM CHLORIDE 25 ML/KG/HR: 5.15; 2.03; 22; 5.4; 6.46; 3.09; .157 INJECTION INTRAVENOUS at 03:42

## 2023-01-01 RX ADMIN — PROPOFOL 25 MCG/KG/MIN: 10 INJECTION, EMULSION INTRAVENOUS at 16:29

## 2023-01-01 RX ADMIN — LABETALOL HYDROCHLORIDE 200 MG: 100 TABLET, FILM COATED ORAL at 22:07

## 2023-01-01 RX ADMIN — SEVELAMER CARBONATE 800 MG: 800 TABLET, FILM COATED ORAL at 21:46

## 2023-01-01 RX ADMIN — MULTIPLE VITAMINS W/ MINERALS TAB 1 TABLET: TAB at 10:07

## 2023-01-01 RX ADMIN — LEVETIRACETAM 500 MG: 5 INJECTION INTRAVENOUS at 03:04

## 2023-01-01 RX ADMIN — LABETALOL HYDROCHLORIDE 200 MG: 100 TABLET, FILM COATED ORAL at 10:06

## 2023-01-01 RX ADMIN — ASPIRIN 81 MG: 81 TABLET, COATED ORAL at 10:06

## 2023-01-01 RX ADMIN — HEPARIN SODIUM 1000 UNITS: 1000 INJECTION INTRAVENOUS; SUBCUTANEOUS at 12:55

## 2023-01-01 RX ADMIN — LABETALOL HYDROCHLORIDE 200 MG: 100 TABLET, FILM COATED ORAL at 08:34

## 2023-01-01 RX ADMIN — EPOETIN ALFA-EPBX 10000 UNITS: 10000 INJECTION, SOLUTION INTRAVENOUS; SUBCUTANEOUS at 08:29

## 2023-01-01 RX ADMIN — CALCIUM CHLORIDE, MAGNESIUM CHLORIDE, DEXTROSE MONOHYDRATE, LACTIC ACID, SODIUM CHLORIDE, SODIUM BICARBONATE AND POTASSIUM CHLORIDE 25 ML/KG/HR: 3.68; 3.05; 22; 5.4; 6.46; 3.09; .314 INJECTION INTRAVENOUS at 09:35

## 2023-01-01 RX ADMIN — LABETALOL HYDROCHLORIDE 200 MG: 100 TABLET, FILM COATED ORAL at 08:28

## 2023-01-01 RX ADMIN — PROPOFOL 20 MCG/KG/MIN: 10 INJECTION, EMULSION INTRAVENOUS at 13:24

## 2023-01-01 RX ADMIN — SODIUM CHLORIDE, SODIUM LACTATE, CALCIUM CHLORIDE, MAGNESIUM CHLORIDE AND DEXTROSE: 4.25; 538; 448; 18.3; 5.08 INJECTION, SOLUTION INTRAPERITONEAL at 13:57

## 2023-01-01 RX ADMIN — CALCIUM CHLORIDE, MAGNESIUM CHLORIDE, DEXTROSE MONOHYDRATE, LACTIC ACID, SODIUM CHLORIDE, SODIUM BICARBONATE AND POTASSIUM CHLORIDE 25 ML/KG/HR: 5.15; 2.03; 22; 5.4; 6.46; 3.09; .157 INJECTION INTRAVENOUS at 20:55

## 2023-01-01 RX ADMIN — HEPARIN SODIUM 11 UNITS/HR: 10000 INJECTION, SOLUTION INTRAVENOUS at 13:00

## 2023-01-01 RX ADMIN — PROPOFOL 40 MCG/KG/MIN: 10 INJECTION, EMULSION INTRAVENOUS at 20:12

## 2023-01-01 RX ADMIN — SODIUM CHLORIDE, SODIUM LACTATE, CALCIUM CHLORIDE, MAGNESIUM CHLORIDE AND DEXTROSE: 2.5; 538; 448; 18.3; 5.08 INJECTION, SOLUTION INTRAPERITONEAL at 12:21

## 2023-01-01 RX ADMIN — NOREPINEPHRINE BITARTRATE 0.07 MCG/KG/MIN: 8 INJECTION, SOLUTION INTRAVENOUS at 20:11

## 2023-01-01 RX ADMIN — ATORVASTATIN CALCIUM 40 MG: 40 TABLET, FILM COATED ORAL at 22:07

## 2023-01-01 RX ADMIN — LACOSAMIDE 100 MG: 10 INJECTION INTRAVENOUS at 21:50

## 2023-01-01 RX ADMIN — HEPARIN SODIUM 1100 UNITS/HR: 10000 INJECTION, SOLUTION INTRAVENOUS at 05:16

## 2023-01-01 RX ADMIN — SODIUM CHLORIDE, SODIUM LACTATE, CALCIUM CHLORIDE, MAGNESIUM CHLORIDE AND DEXTROSE: 4.25; 538; 448; 18.3; 5.08 INJECTION, SOLUTION INTRAPERITONEAL at 12:00

## 2023-01-01 RX ADMIN — Medication 3 MG: at 21:54

## 2023-01-01 RX ADMIN — NOREPINEPHRINE BITARTRATE 0.31 MCG/KG/MIN: 8 INJECTION, SOLUTION INTRAVENOUS at 07:58

## 2023-01-01 RX ADMIN — Medication 60 PERCENT: at 12:20

## 2023-01-01 RX ADMIN — ASPIRIN 81 MG: 81 TABLET, COATED ORAL at 11:05

## 2023-01-01 RX ADMIN — ACETAMINOPHEN 650 MG: 650 SOLUTION ORAL at 00:22

## 2023-01-01 RX ADMIN — CALCIUM CHLORIDE, MAGNESIUM CHLORIDE, DEXTROSE MONOHYDRATE, LACTIC ACID, SODIUM CHLORIDE, SODIUM BICARBONATE AND POTASSIUM CHLORIDE 30 ML/KG/HR: 5.15; 2.03; 22; 5.4; 6.46; 3.09; .157 INJECTION INTRAVENOUS at 17:38

## 2023-01-01 RX ADMIN — CALCIUM CHLORIDE, MAGNESIUM CHLORIDE, DEXTROSE MONOHYDRATE, LACTIC ACID, SODIUM CHLORIDE, SODIUM BICARBONATE AND POTASSIUM CHLORIDE 25 ML/KG/HR: 5.15; 2.03; 22; 5.4; 6.46; 3.09; .157 INJECTION INTRAVENOUS at 17:03

## 2023-01-01 RX ADMIN — CALCIUM CHLORIDE, MAGNESIUM CHLORIDE, DEXTROSE MONOHYDRATE, LACTIC ACID, SODIUM CHLORIDE, SODIUM BICARBONATE AND POTASSIUM CHLORIDE 30 ML/KG/HR: 5.15; 2.03; 22; 5.4; 6.46; 3.09; .157 INJECTION INTRAVENOUS at 06:12

## 2023-01-01 RX ADMIN — SODIUM CHLORIDE, SODIUM LACTATE, CALCIUM CHLORIDE, MAGNESIUM CHLORIDE AND DEXTROSE: 2.5; 538; 448; 18.3; 5.08 INJECTION, SOLUTION INTRAPERITONEAL at 10:18

## 2023-01-01 RX ADMIN — PROPOFOL 20 MCG/KG/MIN: 10 INJECTION, EMULSION INTRAVENOUS at 08:45

## 2023-01-01 RX ADMIN — LEVETIRACETAM 500 MG: 5 INJECTION INTRAVENOUS at 15:09

## 2023-01-01 RX ADMIN — SEVELAMER CARBONATE 800 MG: 800 TABLET, FILM COATED ORAL at 06:14

## 2023-01-01 RX ADMIN — DEXTROSE AND SODIUM CHLORIDE 30 ML/HR: 5; 900 INJECTION, SOLUTION INTRAVENOUS at 11:06

## 2023-01-01 RX ADMIN — PROPOFOL 30 MCG/KG/MIN: 10 INJECTION, EMULSION INTRAVENOUS at 07:59

## 2023-01-01 RX ADMIN — SODIUM BICARBONATE 50 MEQ: 84 INJECTION, SOLUTION INTRAVENOUS at 09:23

## 2023-01-01 RX ADMIN — EPINEPHRINE 0.02 MCG/KG/MIN: 1 INJECTION INTRAMUSCULAR; INTRAVENOUS; SUBCUTANEOUS at 11:09

## 2023-01-01 RX ADMIN — SODIUM CHLORIDE, SODIUM LACTATE, CALCIUM CHLORIDE, MAGNESIUM CHLORIDE AND DEXTROSE: 4.25; 538; 448; 18.3; 5.08 INJECTION, SOLUTION INTRAPERITONEAL at 21:00

## 2023-01-01 RX ADMIN — ALLOPURINOL 100 MG: 100 TABLET ORAL at 11:05

## 2023-01-01 RX ADMIN — ACETAMINOPHEN 650 MG: 650 SOLUTION ORAL at 11:05

## 2023-01-01 RX ADMIN — CALCIUM CHLORIDE, MAGNESIUM CHLORIDE, DEXTROSE MONOHYDRATE, LACTIC ACID, SODIUM CHLORIDE, SODIUM BICARBONATE AND POTASSIUM CHLORIDE 30 ML/KG/HR: 5.15; 2.03; 22; 5.4; 6.46; 3.09; .157 INJECTION INTRAVENOUS at 06:11

## 2023-01-01 RX ADMIN — LABETALOL HYDROCHLORIDE 200 MG: 100 TABLET, FILM COATED ORAL at 22:00

## 2023-01-01 RX ADMIN — AMLODIPINE BESYLATE 10 MG: 10 TABLET ORAL at 10:07

## 2023-01-01 RX ADMIN — CALCIUM CHLORIDE, MAGNESIUM CHLORIDE, DEXTROSE MONOHYDRATE, LACTIC ACID, SODIUM CHLORIDE, SODIUM BICARBONATE AND POTASSIUM CHLORIDE 25 ML/KG/HR: 3.68; 3.05; 22; 5.4; 6.46; 3.09; .314 INJECTION INTRAVENOUS at 09:18

## 2023-01-01 RX ADMIN — SEVELAMER CARBONATE 800 MG: 800 TABLET, FILM COATED ORAL at 17:05

## 2023-01-01 RX ADMIN — HEPARIN SODIUM 1100 UNITS/HR: 10000 INJECTION, SOLUTION INTRAVENOUS at 07:07

## 2023-01-01 RX ADMIN — LEVETIRACETAM 1500 MG: 15 INJECTION INTRAVENOUS at 02:56

## 2023-01-01 RX ADMIN — FUROSEMIDE 80 MG: 10 INJECTION, SOLUTION INTRAMUSCULAR; INTRAVENOUS at 15:45

## 2023-01-01 RX ADMIN — ALLOPURINOL 100 MG: 100 TABLET ORAL at 08:34

## 2023-01-01 RX ADMIN — HEPARIN SODIUM 5000 UNITS: 5000 INJECTION INTRAVENOUS; SUBCUTANEOUS at 16:23

## 2023-01-01 RX ADMIN — CALCIUM CHLORIDE, MAGNESIUM CHLORIDE, DEXTROSE MONOHYDRATE, LACTIC ACID, SODIUM CHLORIDE, SODIUM BICARBONATE AND POTASSIUM CHLORIDE 30 ML/KG/HR: 5.15; 2.03; 22; 5.4; 6.46; 3.09; .157 INJECTION INTRAVENOUS at 11:05

## 2023-01-01 RX ADMIN — PROPOFOL 30 MCG/KG/MIN: 10 INJECTION, EMULSION INTRAVENOUS at 02:04

## 2023-01-01 RX ADMIN — CALCIUM CHLORIDE, MAGNESIUM CHLORIDE, DEXTROSE MONOHYDRATE, LACTIC ACID, SODIUM CHLORIDE, SODIUM BICARBONATE AND POTASSIUM CHLORIDE 25 ML/KG/HR: 5.15; 2.03; 22; 5.4; 6.46; 3.09; .157 INJECTION INTRAVENOUS at 06:47

## 2023-01-01 RX ADMIN — BISACODYL 5 MG: 5 TABLET, COATED ORAL at 10:17

## 2023-01-01 RX ADMIN — PROPOFOL 50 MCG/KG/MIN: 10 INJECTION, EMULSION INTRAVENOUS at 14:46

## 2023-01-01 RX ADMIN — AMLODIPINE BESYLATE 10 MG: 10 TABLET ORAL at 08:34

## 2023-01-01 RX ADMIN — MULTIPLE VITAMINS W/ MINERALS TAB 1 TABLET: TAB at 08:34

## 2023-01-01 RX ADMIN — IPRATROPIUM BROMIDE AND ALBUTEROL SULFATE 3 ML: 2.5; .5 SOLUTION RESPIRATORY (INHALATION) at 04:17

## 2023-01-01 RX ADMIN — HEPARIN SODIUM 5000 UNITS: 5000 INJECTION INTRAVENOUS; SUBCUTANEOUS at 00:11

## 2023-01-01 RX ADMIN — NITROGLYCERIN 0.8 MG: 0.4 TABLET SUBLINGUAL at 16:23

## 2023-01-01 RX ADMIN — SODIUM CHLORIDE, SODIUM LACTATE, CALCIUM CHLORIDE, MAGNESIUM CHLORIDE AND DEXTROSE: 4.25; 538; 448; 18.3; 5.08 INJECTION, SOLUTION INTRAPERITONEAL at 21:32

## 2023-01-01 RX ADMIN — PROPOFOL 50 MCG/KG/MIN: 10 INJECTION, EMULSION INTRAVENOUS at 08:51

## 2023-01-01 RX ADMIN — IOHEXOL 70 ML: 350 INJECTION, SOLUTION INTRAVENOUS at 16:47

## 2023-01-01 RX ADMIN — MULTIPLE VITAMINS W/ MINERALS TAB 1 TABLET: TAB at 08:28

## 2023-01-01 RX ADMIN — SEVELAMER CARBONATE 800 MG: 800 TABLET, FILM COATED ORAL at 17:35

## 2023-01-01 RX ADMIN — CALCIUM CHLORIDE, MAGNESIUM CHLORIDE, DEXTROSE MONOHYDRATE, LACTIC ACID, SODIUM CHLORIDE, SODIUM BICARBONATE AND POTASSIUM CHLORIDE 30 ML/KG/HR: 5.15; 2.03; 22; 5.4; 6.46; 3.09; .157 INJECTION INTRAVENOUS at 07:30

## 2023-01-01 RX ADMIN — HEPARIN SODIUM 5000 UNITS: 5000 INJECTION INTRAVENOUS; SUBCUTANEOUS at 00:24

## 2023-01-01 RX ADMIN — PROPOFOL 20 MCG/KG/MIN: 10 INJECTION, EMULSION INTRAVENOUS at 17:29

## 2023-01-01 RX ADMIN — SODIUM CHLORIDE, SODIUM LACTATE, CALCIUM CHLORIDE, MAGNESIUM CHLORIDE AND DEXTROSE: 2.5; 538; 448; 18.3; 5.08 INJECTION, SOLUTION INTRAPERITONEAL at 01:59

## 2023-01-01 RX ADMIN — ASPIRIN 81 MG: 81 TABLET, COATED ORAL at 10:17

## 2023-01-01 RX ADMIN — NITROGLYCERIN 0.8 MG: 0.4 TABLET SUBLINGUAL at 16:41

## 2023-01-01 RX ADMIN — HEPARIN SODIUM 5000 UNITS: 5000 INJECTION INTRAVENOUS; SUBCUTANEOUS at 08:51

## 2023-01-01 RX ADMIN — SODIUM CHLORIDE, SODIUM LACTATE, CALCIUM CHLORIDE, MAGNESIUM CHLORIDE AND DEXTROSE: 2.5; 538; 448; 18.3; 5.08 INJECTION, SOLUTION INTRAPERITONEAL at 17:57

## 2023-01-01 RX ADMIN — ACETAMINOPHEN 650 MG: 325 TABLET ORAL at 15:08

## 2023-01-01 RX ADMIN — SODIUM CHLORIDE, SODIUM LACTATE, POTASSIUM CHLORIDE, AND CALCIUM CHLORIDE 1000 ML: 600; 310; 30; 20 INJECTION, SOLUTION INTRAVENOUS at 09:35

## 2023-01-01 RX ADMIN — MULTIPLE VITAMINS W/ MINERALS TAB 1 TABLET: TAB at 10:18

## 2023-01-01 RX ADMIN — SODIUM CHLORIDE, SODIUM LACTATE, CALCIUM CHLORIDE, MAGNESIUM CHLORIDE AND DEXTROSE: 2.5; 538; 448; 18.3; 5.08 INJECTION, SOLUTION INTRAPERITONEAL at 08:38

## 2023-01-01 RX ADMIN — CALCIUM CHLORIDE, MAGNESIUM CHLORIDE, DEXTROSE MONOHYDRATE, LACTIC ACID, SODIUM CHLORIDE, SODIUM BICARBONATE AND POTASSIUM CHLORIDE 30 ML/KG/HR: 5.15; 2.03; 22; 5.4; 6.46; 3.09; .157 INJECTION INTRAVENOUS at 23:41

## 2023-01-01 RX ADMIN — SEVELAMER CARBONATE 800 MG: 800 TABLET, FILM COATED ORAL at 13:13

## 2023-01-01 RX ADMIN — SEVELAMER CARBONATE 800 MG: 800 TABLET, FILM COATED ORAL at 17:57

## 2023-01-01 RX ADMIN — CALCIUM CHLORIDE, MAGNESIUM CHLORIDE, DEXTROSE MONOHYDRATE, LACTIC ACID, SODIUM CHLORIDE, SODIUM BICARBONATE AND POTASSIUM CHLORIDE 25 ML/KG/HR: 5.15; 2.03; 22; 5.4; 6.46; 3.09; .157 INJECTION INTRAVENOUS at 03:41

## 2023-01-01 RX ADMIN — DEXTROSE MONOHYDRATE 12.5 G: 25 INJECTION, SOLUTION INTRAVENOUS at 17:00

## 2023-01-01 RX ADMIN — EPINEPHRINE 0.01 MCG/KG/MIN: 1 INJECTION INTRAMUSCULAR; INTRAVENOUS; SUBCUTANEOUS at 10:54

## 2023-01-01 RX ADMIN — ASPIRIN 81 MG: 81 TABLET, COATED ORAL at 08:28

## 2023-01-01 RX ADMIN — SODIUM CHLORIDE, SODIUM LACTATE, CALCIUM CHLORIDE, MAGNESIUM CHLORIDE AND DEXTROSE: 2.5; 538; 448; 18.3; 5.08 INJECTION, SOLUTION INTRAPERITONEAL at 16:00

## 2023-01-01 RX ADMIN — Medication 80 PERCENT: at 11:50

## 2023-01-01 RX ADMIN — CALCIUM POLYCARBOPHIL 625 MG: 625 TABLET ORAL at 11:05

## 2023-01-01 RX ADMIN — NOREPINEPHRINE BITARTRATE 0.18 MCG/KG/MIN: 8 INJECTION, SOLUTION INTRAVENOUS at 13:23

## 2023-01-01 RX ADMIN — CALCIUM CHLORIDE, MAGNESIUM CHLORIDE, DEXTROSE MONOHYDRATE, LACTIC ACID, SODIUM CHLORIDE, SODIUM BICARBONATE AND POTASSIUM CHLORIDE 30 ML/KG/HR: 5.15; 2.03; 22; 5.4; 6.46; 3.09; .157 INJECTION INTRAVENOUS at 21:15

## 2023-01-01 RX ADMIN — ERGOCALCIFEROL 1250 MCG: 1.25 CAPSULE ORAL at 10:07

## 2023-01-01 RX ADMIN — CALCIUM POLYCARBOPHIL 625 MG: 625 TABLET ORAL at 19:00

## 2023-01-01 RX ADMIN — VASOPRESSIN 0.03 UNITS/MIN: 0.2 INJECTION INTRAVENOUS at 06:54

## 2023-01-01 RX ADMIN — SEVELAMER CARBONATE 800 MG: 800 TABLET, FILM COATED ORAL at 22:07

## 2023-01-01 RX ADMIN — HEPARIN SODIUM 1300 UNITS/HR: 10000 INJECTION, SOLUTION INTRAVENOUS at 06:15

## 2023-01-01 RX ADMIN — PROPOFOL 20 MCG/KG/MIN: 10 INJECTION, EMULSION INTRAVENOUS at 02:20

## 2023-01-01 RX ADMIN — PROPOFOL 40 MCG/KG/MIN: 10 INJECTION, EMULSION INTRAVENOUS at 04:55

## 2023-01-01 RX ADMIN — PIPERACILLIN SODIUM AND TAZOBACTAM SODIUM 2.25 G: 2; .25 INJECTION, SOLUTION INTRAVENOUS at 08:10

## 2023-01-01 RX ADMIN — SODIUM CHLORIDE, SODIUM LACTATE, CALCIUM CHLORIDE, MAGNESIUM CHLORIDE AND DEXTROSE: 2.5; 538; 448; 18.3; 5.08 INJECTION, SOLUTION INTRAPERITONEAL at 02:24

## 2023-01-01 RX ADMIN — CALCIUM POLYCARBOPHIL 625 MG: 625 TABLET ORAL at 10:17

## 2023-01-01 RX ADMIN — VASOPRESSIN 0.03 UNITS/MIN: 0.2 INJECTION INTRAVENOUS at 06:12

## 2023-01-01 RX ADMIN — PROPOFOL 50 MCG/KG/MIN: 10 INJECTION, EMULSION INTRAVENOUS at 18:17

## 2023-01-01 RX ADMIN — HYDROCORTISONE SODIUM SUCCINATE 50 MG: 100 INJECTION, POWDER, FOR SOLUTION INTRAMUSCULAR; INTRAVENOUS at 07:31

## 2023-01-01 RX ADMIN — SODIUM CHLORIDE, SODIUM LACTATE, CALCIUM CHLORIDE, MAGNESIUM CHLORIDE AND DEXTROSE: 4.25; 538; 448; 18.3; 5.08 INJECTION, SOLUTION INTRAPERITONEAL at 21:35

## 2023-01-01 RX ADMIN — AMLODIPINE BESYLATE 10 MG: 10 TABLET ORAL at 10:18

## 2023-01-01 RX ADMIN — EPINEPHRINE 1 MG: 0.1 INJECTION, SOLUTION ENDOTRACHEAL; INTRACARDIAC; INTRAVENOUS at 09:23

## 2023-01-01 RX ADMIN — CALCIUM CHLORIDE, MAGNESIUM CHLORIDE, DEXTROSE MONOHYDRATE, LACTIC ACID, SODIUM CHLORIDE, SODIUM BICARBONATE AND POTASSIUM CHLORIDE 30 ML/KG/HR: 5.15; 2.03; 22; 5.4; 6.46; 3.09; .157 INJECTION INTRAVENOUS at 10:03

## 2023-01-01 RX ADMIN — SODIUM CHLORIDE, SODIUM LACTATE, CALCIUM CHLORIDE, MAGNESIUM CHLORIDE AND DEXTROSE: 2.5; 538; 448; 18.3; 5.08 INJECTION, SOLUTION INTRAPERITONEAL at 18:00

## 2023-01-01 RX ADMIN — EPINEPHRINE 1 MG: 0.1 INJECTION, SOLUTION ENDOTRACHEAL; INTRACARDIAC; INTRAVENOUS at 09:14

## 2023-01-01 RX ADMIN — HEPARIN SODIUM 5000 UNITS: 5000 INJECTION INTRAVENOUS; SUBCUTANEOUS at 00:32

## 2023-01-01 RX ADMIN — ACETAMINOPHEN 650 MG: 650 SOLUTION ORAL at 21:53

## 2023-01-01 RX ADMIN — SEVELAMER CARBONATE 800 MG: 800 TABLET, FILM COATED ORAL at 12:52

## 2023-01-01 RX ADMIN — ASPIRIN 81 MG: 81 TABLET, COATED ORAL at 08:34

## 2023-01-01 RX ADMIN — SODIUM CHLORIDE, SODIUM LACTATE, CALCIUM CHLORIDE, MAGNESIUM CHLORIDE AND DEXTROSE: 4.25; 538; 448; 18.3; 5.08 INJECTION, SOLUTION INTRAPERITONEAL at 13:13

## 2023-01-01 RX ADMIN — IOHEXOL 70 ML: 350 INJECTION, SOLUTION INTRAVENOUS at 16:29

## 2023-01-01 RX ADMIN — CALCIUM CHLORIDE, MAGNESIUM CHLORIDE, DEXTROSE MONOHYDRATE, LACTIC ACID, SODIUM CHLORIDE, SODIUM BICARBONATE AND POTASSIUM CHLORIDE 30 ML/KG/HR: 5.15; 2.03; 22; 5.4; 6.46; 3.09; .157 INJECTION INTRAVENOUS at 11:15

## 2023-01-01 RX ADMIN — HEPARIN SODIUM 11 UNITS/HR: 10000 INJECTION, SOLUTION INTRAVENOUS at 09:11

## 2023-01-01 RX ADMIN — Medication 80 PERCENT: at 09:41

## 2023-01-01 RX ADMIN — Medication 3 MG: at 22:07

## 2023-01-01 RX ADMIN — ALLOPURINOL 100 MG: 100 TABLET ORAL at 08:28

## 2023-01-01 RX ADMIN — AMLODIPINE BESYLATE 10 MG: 10 TABLET ORAL at 08:28

## 2023-01-01 RX ADMIN — VASOPRESSIN 0.03 UNITS/MIN: 0.2 INJECTION INTRAVENOUS at 13:22

## 2023-01-01 RX ADMIN — ACETAMINOPHEN 650 MG: 650 SOLUTION ORAL at 19:51

## 2023-01-01 RX ADMIN — VASOPRESSIN 0.03 UNITS/MIN: 0.2 INJECTION INTRAVENOUS at 20:13

## 2023-01-01 RX ADMIN — SEVELAMER CARBONATE 800 MG: 800 TABLET, FILM COATED ORAL at 13:06

## 2023-01-01 RX ADMIN — EPINEPHRINE 1 MG: 0.1 INJECTION, SOLUTION ENDOTRACHEAL; INTRACARDIAC; INTRAVENOUS at 09:17

## 2023-01-01 RX ADMIN — AMLODIPINE BESYLATE 10 MG: 10 TABLET ORAL at 11:05

## 2023-01-01 RX ADMIN — DEXTROSE AND SODIUM CHLORIDE 30 ML/HR: 5; 900 INJECTION, SOLUTION INTRAVENOUS at 07:59

## 2023-01-01 RX ADMIN — HEPARIN SODIUM 900 UNITS/HR: 10000 INJECTION, SOLUTION INTRAVENOUS at 15:00

## 2023-01-01 RX ADMIN — ATORVASTATIN CALCIUM 40 MG: 40 TABLET, FILM COATED ORAL at 22:00

## 2023-01-01 RX ADMIN — CALCIUM POLYCARBOPHIL 625 MG: 625 TABLET ORAL at 08:28

## 2023-01-01 RX ADMIN — HEPARIN SODIUM 5000 UNITS: 5000 INJECTION INTRAVENOUS; SUBCUTANEOUS at 00:48

## 2023-01-01 RX ADMIN — CALCIUM CHLORIDE, MAGNESIUM CHLORIDE, DEXTROSE MONOHYDRATE, LACTIC ACID, SODIUM CHLORIDE, SODIUM BICARBONATE AND POTASSIUM CHLORIDE 25 ML/KG/HR: 5.15; 2.03; 22; 5.4; 6.46; 3.09; .157 INJECTION INTRAVENOUS at 06:46

## 2023-01-01 RX ADMIN — ERGOCALCIFEROL 1250 MCG: 1.25 CAPSULE ORAL at 08:28

## 2023-01-01 RX ADMIN — HEPARIN SODIUM 5000 UNITS: 5000 INJECTION INTRAVENOUS; SUBCUTANEOUS at 09:11

## 2023-01-01 RX ADMIN — CALCIUM CHLORIDE, MAGNESIUM CHLORIDE, DEXTROSE MONOHYDRATE, LACTIC ACID, SODIUM CHLORIDE, SODIUM BICARBONATE AND POTASSIUM CHLORIDE 30 ML/KG/HR: 5.15; 2.03; 22; 5.4; 6.46; 3.09; .157 INJECTION INTRAVENOUS at 17:28

## 2023-01-01 RX ADMIN — PROPOFOL 50 MCG/KG/MIN: 10 INJECTION, EMULSION INTRAVENOUS at 21:58

## 2023-01-01 RX ADMIN — CALCIUM CHLORIDE, MAGNESIUM CHLORIDE, DEXTROSE MONOHYDRATE, LACTIC ACID, SODIUM CHLORIDE, SODIUM BICARBONATE AND POTASSIUM CHLORIDE 30 ML/KG/HR: 5.15; 2.03; 22; 5.4; 6.46; 3.09; .157 INJECTION INTRAVENOUS at 02:20

## 2023-01-01 RX ADMIN — PANTOPRAZOLE SODIUM 40 MG: 40 INJECTION, POWDER, FOR SOLUTION INTRAVENOUS at 06:22

## 2023-01-01 RX ADMIN — LABETALOL HYDROCHLORIDE 200 MG: 100 TABLET, FILM COATED ORAL at 11:05

## 2023-01-01 RX ADMIN — PROPOFOL 50 MCG/KG/MIN: 10 INJECTION, EMULSION INTRAVENOUS at 11:05

## 2023-01-01 RX ADMIN — SODIUM CHLORIDE, SODIUM LACTATE, CALCIUM CHLORIDE, MAGNESIUM CHLORIDE AND DEXTROSE: 2.5; 538; 448; 18.3; 5.08 INJECTION, SOLUTION INTRAPERITONEAL at 17:35

## 2023-01-01 RX ADMIN — CALCIUM CHLORIDE, MAGNESIUM CHLORIDE, DEXTROSE MONOHYDRATE, LACTIC ACID, SODIUM CHLORIDE, SODIUM BICARBONATE AND POTASSIUM CHLORIDE 25 ML/KG/HR: 5.15; 2.03; 22; 5.4; 6.46; 3.09; .157 INJECTION INTRAVENOUS at 23:05

## 2023-01-01 RX ADMIN — SODIUM CHLORIDE, SODIUM LACTATE, CALCIUM CHLORIDE, MAGNESIUM CHLORIDE AND DEXTROSE: 2.5; 538; 448; 18.3; 5.08 INJECTION, SOLUTION INTRAPERITONEAL at 02:48

## 2023-01-01 RX ADMIN — SEVELAMER CARBONATE 800 MG: 800 TABLET, FILM COATED ORAL at 06:22

## 2023-01-01 RX ADMIN — CALCIUM CHLORIDE, MAGNESIUM CHLORIDE, DEXTROSE MONOHYDRATE, LACTIC ACID, SODIUM CHLORIDE, SODIUM BICARBONATE AND POTASSIUM CHLORIDE 25 ML/KG/HR: 5.15; 2.03; 22; 5.4; 6.46; 3.09; .157 INJECTION INTRAVENOUS at 00:23

## 2023-01-01 RX ADMIN — EPINEPHRINE 0.02 MCG/KG/MIN: 1 INJECTION INTRAMUSCULAR; INTRAVENOUS; SUBCUTANEOUS at 13:24

## 2023-01-01 RX ADMIN — SEVELAMER CARBONATE 800 MG: 800 TABLET, FILM COATED ORAL at 17:19

## 2023-01-01 RX ADMIN — HEPARIN SODIUM 5000 UNITS: 5000 INJECTION INTRAVENOUS; SUBCUTANEOUS at 16:05

## 2023-01-01 RX ADMIN — LACOSAMIDE 100 MG: 10 INJECTION INTRAVENOUS at 08:11

## 2023-01-01 RX ADMIN — ACETAMINOPHEN 650 MG: 650 SOLUTION ORAL at 04:18

## 2023-01-01 RX ADMIN — EPINEPHRINE 1 MG: 0.1 INJECTION, SOLUTION ENDOTRACHEAL; INTRACARDIAC; INTRAVENOUS at 09:20

## 2023-01-01 RX ADMIN — SODIUM CHLORIDE, SODIUM LACTATE, CALCIUM CHLORIDE, MAGNESIUM CHLORIDE AND DEXTROSE: 2.5; 538; 448; 18.3; 5.08 INJECTION, SOLUTION INTRAPERITONEAL at 02:13

## 2023-01-01 RX ADMIN — SEVELAMER CARBONATE 800 MG: 800 TABLET, FILM COATED ORAL at 21:14

## 2023-01-01 RX ADMIN — PANTOPRAZOLE SODIUM 40 MG: 40 INJECTION, POWDER, FOR SOLUTION INTRAVENOUS at 06:15

## 2023-01-01 RX ADMIN — PROPOFOL 25 MCG/KG/MIN: 10 INJECTION, EMULSION INTRAVENOUS at 00:11

## 2023-01-01 RX ADMIN — CALCIUM CHLORIDE, MAGNESIUM CHLORIDE, DEXTROSE MONOHYDRATE, LACTIC ACID, SODIUM CHLORIDE, SODIUM BICARBONATE AND POTASSIUM CHLORIDE 25 ML/KG/HR: 5.15; 2.03; 22; 5.4; 6.46; 3.09; .157 INJECTION INTRAVENOUS at 00:22

## 2023-01-01 RX ADMIN — SODIUM CHLORIDE, SODIUM LACTATE, CALCIUM CHLORIDE, MAGNESIUM CHLORIDE AND DEXTROSE: 4.25; 538; 448; 18.3; 5.08 INJECTION, SOLUTION INTRAPERITONEAL at 12:42

## 2023-01-01 RX ADMIN — PANTOPRAZOLE SODIUM 40 MG: 40 INJECTION, POWDER, FOR SOLUTION INTRAVENOUS at 06:13

## 2023-01-01 RX ADMIN — HEPARIN SODIUM 4000 UNITS: 5000 INJECTION, SOLUTION INTRAVENOUS; SUBCUTANEOUS at 15:00

## 2023-01-01 RX ADMIN — Medication 3 MG: at 22:00

## 2023-01-01 RX ADMIN — EPINEPHRINE 1 MG: 0.1 INJECTION, SOLUTION ENDOTRACHEAL; INTRACARDIAC; INTRAVENOUS at 09:13

## 2023-01-01 RX ADMIN — NOREPINEPHRINE BITARTRATE 0.01 MCG/KG/MIN: 8 SOLUTION at 10:03

## 2023-01-01 RX ADMIN — SEVELAMER CARBONATE 800 MG: 800 TABLET, FILM COATED ORAL at 22:00

## 2023-01-01 RX ADMIN — LABETALOL HYDROCHLORIDE 200 MG: 100 TABLET, FILM COATED ORAL at 10:16

## 2023-01-01 RX ADMIN — LACOSAMIDE 100 MG: 10 INJECTION INTRAVENOUS at 16:59

## 2023-01-01 RX ADMIN — LABETALOL HYDROCHLORIDE 200 MG: 100 TABLET, FILM COATED ORAL at 21:54

## 2023-01-01 RX ADMIN — LABETALOL HYDROCHLORIDE 200 MG: 100 TABLET, FILM COATED ORAL at 21:14

## 2023-01-01 RX ADMIN — PANTOPRAZOLE SODIUM 40 MG: 40 INJECTION, POWDER, FOR SOLUTION INTRAVENOUS at 06:53

## 2023-01-01 RX ADMIN — ALLOPURINOL 100 MG: 100 TABLET ORAL at 10:06

## 2023-01-01 RX ADMIN — PROPOFOL 20 MCG/KG/MIN: 10 INJECTION, EMULSION INTRAVENOUS at 17:50

## 2023-01-01 RX ADMIN — Medication 3 MG: at 21:46

## 2023-01-01 RX ADMIN — SEVELAMER CARBONATE 800 MG: 800 TABLET, FILM COATED ORAL at 06:53

## 2023-01-01 RX ADMIN — PROPOFOL 40 MCG/KG/MIN: 10 INJECTION, EMULSION INTRAVENOUS at 00:37

## 2023-01-01 RX ADMIN — PROPOFOL 50 MCG/KG/MIN: 10 INJECTION, EMULSION INTRAVENOUS at 07:54

## 2023-01-01 RX ADMIN — HEPARIN SODIUM 5000 UNITS: 5000 INJECTION INTRAVENOUS; SUBCUTANEOUS at 16:30

## 2023-01-01 RX ADMIN — ATORVASTATIN CALCIUM 40 MG: 40 TABLET, FILM COATED ORAL at 21:14

## 2023-01-01 SDOH — SOCIAL STABILITY: SOCIAL INSECURITY: WITHIN THE LAST YEAR, HAVE YOU BEEN AFRAID OF YOUR PARTNER OR EX-PARTNER?: NO

## 2023-01-01 SDOH — HEALTH STABILITY: MENTAL HEALTH: HOW OFTEN DO YOU HAVE 6 OR MORE DRINKS ON ONE OCCASION?: NEVER

## 2023-01-01 SDOH — SOCIAL STABILITY: SOCIAL INSECURITY: DO YOU FEEL ANYONE HAS EXPLOITED OR TAKEN ADVANTAGE OF YOU FINANCIALLY OR OF YOUR PERSONAL PROPERTY?: UNABLE TO ASSESS

## 2023-01-01 SDOH — ECONOMIC STABILITY: INCOME INSECURITY: HOW HARD IS IT FOR YOU TO PAY FOR THE VERY BASICS LIKE FOOD, HOUSING, MEDICAL CARE, AND HEATING?: NOT HARD AT ALL

## 2023-01-01 SDOH — SOCIAL STABILITY: SOCIAL INSECURITY: WERE YOU ABLE TO COMPLETE ALL THE BEHAVIORAL HEALTH SCREENINGS?: NO

## 2023-01-01 SDOH — SOCIAL STABILITY: SOCIAL INSECURITY
WITHIN THE LAST YEAR, HAVE TO BEEN RAPED OR FORCED TO HAVE ANY KIND OF SEXUAL ACTIVITY BY YOUR PARTNER OR EX-PARTNER?: NO

## 2023-01-01 SDOH — ECONOMIC STABILITY: TRANSPORTATION INSECURITY
IN THE PAST 12 MONTHS, HAS LACK OF TRANSPORTATION KEPT YOU FROM MEETINGS, WORK, OR FROM GETTING THINGS NEEDED FOR DAILY LIVING?: NO

## 2023-01-01 SDOH — SOCIAL STABILITY: SOCIAL NETWORK: HOW OFTEN DO YOU ATTEND CHURCH OR RELIGIOUS SERVICES?: NEVER

## 2023-01-01 SDOH — SOCIAL STABILITY: SOCIAL INSECURITY: DOES ANYONE TRY TO KEEP YOU FROM HAVING/CONTACTING OTHER FRIENDS OR DOING THINGS OUTSIDE YOUR HOME?: UNABLE TO ASSESS

## 2023-01-01 SDOH — HEALTH STABILITY: MENTAL HEALTH
STRESS IS WHEN SOMEONE FEELS TENSE, NERVOUS, ANXIOUS, OR CAN'T SLEEP AT NIGHT BECAUSE THEIR MIND IS TROUBLED. HOW STRESSED ARE YOU?: TO SOME EXTENT

## 2023-01-01 SDOH — HEALTH STABILITY: MENTAL HEALTH: HOW MANY STANDARD DRINKS CONTAINING ALCOHOL DO YOU HAVE ON A TYPICAL DAY?: 1 OR 2

## 2023-01-01 SDOH — SOCIAL STABILITY: SOCIAL INSECURITY
WITHIN THE LAST YEAR, HAVE YOU BEEN KICKED, HIT, SLAPPED, OR OTHERWISE PHYSICALLY HURT BY YOUR PARTNER OR EX-PARTNER?: NO

## 2023-01-01 SDOH — HEALTH STABILITY: PHYSICAL HEALTH: ON AVERAGE, HOW MANY DAYS PER WEEK DO YOU ENGAGE IN MODERATE TO STRENUOUS EXERCISE (LIKE A BRISK WALK)?: 0 DAYS

## 2023-01-01 SDOH — SOCIAL STABILITY: SOCIAL INSECURITY: ARE YOU OR HAVE YOU BEEN THREATENED OR ABUSED PHYSICALLY, EMOTIONALLY, OR SEXUALLY BY ANYONE?: NO

## 2023-01-01 SDOH — SOCIAL STABILITY: SOCIAL INSECURITY: HAS ANYONE EVER THREATENED TO HURT YOUR FAMILY OR YOUR PETS?: NO

## 2023-01-01 SDOH — SOCIAL STABILITY: SOCIAL INSECURITY: ABUSE: ADULT

## 2023-01-01 SDOH — SOCIAL STABILITY: SOCIAL INSECURITY: DO YOU FEEL UNSAFE GOING BACK TO THE PLACE WHERE YOU ARE LIVING?: NO

## 2023-01-01 SDOH — ECONOMIC STABILITY: HOUSING INSECURITY
IN THE LAST 12 MONTHS, WAS THERE A TIME WHEN YOU DID NOT HAVE A STEADY PLACE TO SLEEP OR SLEPT IN A SHELTER (INCLUDING NOW)?: NO

## 2023-01-01 SDOH — HEALTH STABILITY: MENTAL HEALTH: HOW OFTEN DO YOU HAVE A DRINK CONTAINING ALCOHOL?: MONTHLY OR LESS

## 2023-01-01 SDOH — ECONOMIC STABILITY: TRANSPORTATION INSECURITY
IN THE PAST 12 MONTHS, HAS THE LACK OF TRANSPORTATION KEPT YOU FROM MEDICAL APPOINTMENTS OR FROM GETTING MEDICATIONS?: NO

## 2023-01-01 SDOH — ECONOMIC STABILITY: INCOME INSECURITY: IN THE LAST 12 MONTHS, WAS THERE A TIME WHEN YOU WERE NOT ABLE TO PAY THE MORTGAGE OR RENT ON TIME?: NO

## 2023-01-01 SDOH — SOCIAL STABILITY: SOCIAL INSECURITY: DO YOU FEEL ANYONE HAS EXPLOITED OR TAKEN ADVANTAGE OF YOU FINANCIALLY OR OF YOUR PERSONAL PROPERTY?: NO

## 2023-01-01 SDOH — HEALTH STABILITY: PHYSICAL HEALTH: ON AVERAGE, HOW MANY MINUTES DO YOU ENGAGE IN EXERCISE AT THIS LEVEL?: 0 MIN

## 2023-01-01 SDOH — SOCIAL STABILITY: SOCIAL INSECURITY: WITHIN THE LAST YEAR, HAVE YOU BEEN HUMILIATED OR EMOTIONALLY ABUSED IN OTHER WAYS BY YOUR PARTNER OR EX-PARTNER?: NO

## 2023-01-01 SDOH — SOCIAL STABILITY: SOCIAL NETWORK
DO YOU BELONG TO ANY CLUBS OR ORGANIZATIONS SUCH AS CHURCH GROUPS UNIONS, FRATERNAL OR ATHLETIC GROUPS, OR SCHOOL GROUPS?: NO

## 2023-01-01 SDOH — SOCIAL STABILITY: SOCIAL INSECURITY: DOES ANYONE TRY TO KEEP YOU FROM HAVING/CONTACTING OTHER FRIENDS OR DOING THINGS OUTSIDE YOUR HOME?: NO

## 2023-01-01 SDOH — HEALTH STABILITY: MENTAL HEALTH: HOW OFTEN DO YOU HAVE A DRINK CONTAINING ALCOHOL?: NEVER

## 2023-01-01 SDOH — SOCIAL STABILITY: SOCIAL INSECURITY: ARE YOU OR HAVE YOU BEEN THREATENED OR ABUSED PHYSICALLY, EMOTIONALLY, OR SEXUALLY BY ANYONE?: UNABLE TO ASSESS

## 2023-01-01 SDOH — ECONOMIC STABILITY: FOOD INSECURITY: WITHIN THE PAST 12 MONTHS, YOU WORRIED THAT YOUR FOOD WOULD RUN OUT BEFORE YOU GOT MONEY TO BUY MORE.: NEVER TRUE

## 2023-01-01 SDOH — SOCIAL STABILITY: SOCIAL INSECURITY: HAVE YOU HAD THOUGHTS OF HARMING ANYONE ELSE?: NO

## 2023-01-01 SDOH — ECONOMIC STABILITY: FOOD INSECURITY: WITHIN THE PAST 12 MONTHS, THE FOOD YOU BOUGHT JUST DIDN'T LAST AND YOU DIDN'T HAVE MONEY TO GET MORE.: NEVER TRUE

## 2023-01-01 SDOH — SOCIAL STABILITY: SOCIAL NETWORK: ARE YOU MARRIED, WIDOWED, DIVORCED, SEPARATED, NEVER MARRIED, OR LIVING WITH A PARTNER?: MARRIED

## 2023-01-01 SDOH — SOCIAL STABILITY: SOCIAL INSECURITY: WERE YOU ABLE TO COMPLETE ALL THE BEHAVIORAL HEALTH SCREENINGS?: YES

## 2023-01-01 SDOH — SOCIAL STABILITY: SOCIAL NETWORK
IN A TYPICAL WEEK, HOW MANY TIMES DO YOU TALK ON THE PHONE WITH FAMILY, FRIENDS, OR NEIGHBORS?: MORE THAN THREE TIMES A WEEK

## 2023-01-01 SDOH — SOCIAL STABILITY: SOCIAL INSECURITY: DO YOU FEEL UNSAFE GOING BACK TO THE PLACE WHERE YOU ARE LIVING?: UNABLE TO ASSESS

## 2023-01-01 SDOH — ECONOMIC STABILITY: INCOME INSECURITY: IN THE PAST 12 MONTHS, HAS THE ELECTRIC, GAS, OIL, OR WATER COMPANY THREATENED TO SHUT OFF SERVICE IN YOUR HOME?: NO

## 2023-01-01 SDOH — SOCIAL STABILITY: SOCIAL INSECURITY: ARE THERE ANY APPARENT SIGNS OF INJURIES/BEHAVIORS THAT COULD BE RELATED TO ABUSE/NEGLECT?: NO

## 2023-01-01 SDOH — SOCIAL STABILITY: SOCIAL NETWORK: HOW OFTEN DO YOU GET TOGETHER WITH FRIENDS OR RELATIVES?: MORE THAN THREE TIMES A WEEK

## 2023-01-01 SDOH — HEALTH STABILITY: MENTAL HEALTH: HOW MANY STANDARD DRINKS CONTAINING ALCOHOL DO YOU HAVE ON A TYPICAL DAY?: PATIENT DOES NOT DRINK

## 2023-01-01 SDOH — SOCIAL STABILITY: SOCIAL INSECURITY: ARE THERE ANY APPARENT SIGNS OF INJURIES/BEHAVIORS THAT COULD BE RELATED TO ABUSE/NEGLECT?: UNABLE TO ASSESS

## 2023-01-01 SDOH — SOCIAL STABILITY: SOCIAL INSECURITY: HAS ANYONE EVER THREATENED TO HURT YOUR FAMILY OR YOUR PETS?: UNABLE TO ASSESS

## 2023-01-01 SDOH — ECONOMIC STABILITY: HOUSING INSECURITY: IN THE LAST 12 MONTHS, HOW MANY PLACES HAVE YOU LIVED?: 1

## 2023-01-01 SDOH — SOCIAL STABILITY: SOCIAL NETWORK: HOW OFTEN DO YOU ATTENT MEETINGS OF THE CLUB OR ORGANIZATION YOU BELONG TO?: NEVER

## 2023-01-01 ASSESSMENT — COGNITIVE AND FUNCTIONAL STATUS - GENERAL
DAILY ACTIVITIY SCORE: 6
DRESSING REGULAR UPPER BODY CLOTHING: TOTAL
TOILETING: TOTAL
DRESSING REGULAR UPPER BODY CLOTHING: TOTAL
MOBILITY SCORE: 18
HELP NEEDED FOR BATHING: TOTAL
PERSONAL GROOMING: TOTAL
MOVING FROM LYING ON BACK TO SITTING ON SIDE OF FLAT BED WITH BEDRAILS: A LITTLE
TOILETING: A LITTLE
MOVING TO AND FROM BED TO CHAIR: TOTAL
DRESSING REGULAR UPPER BODY CLOTHING: A LITTLE
EATING MEALS: TOTAL
PERSONAL GROOMING: TOTAL
TOILETING: TOTAL
DAILY ACTIVITIY SCORE: 6
MOVING TO AND FROM BED TO CHAIR: TOTAL
HELP NEEDED FOR BATHING: A LITTLE
DRESSING REGULAR LOWER BODY CLOTHING: TOTAL
STANDING UP FROM CHAIR USING ARMS: TOTAL
MOBILITY SCORE: 24
HELP NEEDED FOR BATHING: TOTAL
STANDING UP FROM CHAIR USING ARMS: TOTAL
EATING MEALS: A LITTLE
TURNING FROM BACK TO SIDE WHILE IN FLAT BAD: TOTAL
DRESSING REGULAR LOWER BODY CLOTHING: A LITTLE
TOILETING: A LITTLE
TURNING FROM BACK TO SIDE WHILE IN FLAT BAD: TOTAL
TURNING FROM BACK TO SIDE WHILE IN FLAT BAD: TOTAL
PERSONAL GROOMING: A LITTLE
MOBILITY SCORE: 24
MOVING FROM LYING ON BACK TO SITTING ON SIDE OF FLAT BED WITH BEDRAILS: A LITTLE
TOILETING: TOTAL
DRESSING REGULAR UPPER BODY CLOTHING: TOTAL
PERSONAL GROOMING: TOTAL
MOVING FROM LYING ON BACK TO SITTING ON SIDE OF FLAT BED WITH BEDRAILS: TOTAL
DRESSING REGULAR UPPER BODY CLOTHING: TOTAL
PERSONAL GROOMING: A LITTLE
HELP NEEDED FOR BATHING: TOTAL
STANDING UP FROM CHAIR USING ARMS: A LITTLE
MOBILITY SCORE: 6
MOBILITY SCORE: 18
DAILY ACTIVITIY SCORE: 18
DRESSING REGULAR LOWER BODY CLOTHING: A LITTLE
HELP NEEDED FOR BATHING: TOTAL
PATIENT BASELINE BEDBOUND: NO
MOVING FROM LYING ON BACK TO SITTING ON SIDE OF FLAT BED WITH BEDRAILS: TOTAL
STANDING UP FROM CHAIR USING ARMS: TOTAL
WALKING IN HOSPITAL ROOM: A LITTLE
EATING MEALS: TOTAL
DRESSING REGULAR UPPER BODY CLOTHING: TOTAL
CLIMB 3 TO 5 STEPS WITH RAILING: A LITTLE
MOBILITY SCORE: 24
TURNING FROM BACK TO SIDE WHILE IN FLAT BAD: A LITTLE
WALKING IN HOSPITAL ROOM: A LITTLE
WALKING IN HOSPITAL ROOM: TOTAL
WALKING IN HOSPITAL ROOM: TOTAL
DRESSING REGULAR LOWER BODY CLOTHING: TOTAL
PERSONAL GROOMING: TOTAL
TURNING FROM BACK TO SIDE WHILE IN FLAT BAD: A LITTLE
TOILETING: TOTAL
MOVING TO AND FROM BED TO CHAIR: TOTAL
CLIMB 3 TO 5 STEPS WITH RAILING: TOTAL
CLIMB 3 TO 5 STEPS WITH RAILING: TOTAL
TOILETING: A LITTLE
DAILY ACTIVITIY SCORE: 20
WALKING IN HOSPITAL ROOM: A LITTLE
MOVING TO AND FROM BED TO CHAIR: A LITTLE
CLIMB 3 TO 5 STEPS WITH RAILING: A LITTLE
DRESSING REGULAR LOWER BODY CLOTHING: TOTAL
DAILY ACTIVITIY SCORE: 24
WALKING IN HOSPITAL ROOM: TOTAL
EATING MEALS: A LITTLE
DAILY ACTIVITIY SCORE: 18
PERSONAL GROOMING: A LITTLE
CLIMB 3 TO 5 STEPS WITH RAILING: TOTAL
MOBILITY SCORE: 22
DRESSING REGULAR UPPER BODY CLOTHING: A LITTLE
MOVING TO AND FROM BED TO CHAIR: A LITTLE
DAILY ACTIVITIY SCORE: 6
PERSONAL GROOMING: TOTAL
MOBILITY SCORE: 6
PATIENT BASELINE BEDBOUND: NO
TOILETING: TOTAL
MOVING TO AND FROM BED TO CHAIR: TOTAL
DRESSING REGULAR UPPER BODY CLOTHING: A LITTLE
STANDING UP FROM CHAIR USING ARMS: TOTAL
DAILY ACTIVITIY SCORE: 24
MOBILITY SCORE: 6
CLIMB 3 TO 5 STEPS WITH RAILING: A LITTLE
WALKING IN HOSPITAL ROOM: TOTAL
DAILY ACTIVITIY SCORE: 6
HELP NEEDED FOR BATHING: A LITTLE
STANDING UP FROM CHAIR USING ARMS: A LITTLE
MOVING FROM LYING ON BACK TO SITTING ON SIDE OF FLAT BED WITH BEDRAILS: TOTAL
CLIMB 3 TO 5 STEPS WITH RAILING: TOTAL
DAILY ACTIVITIY SCORE: 6
MOBILITY SCORE: 6
EATING MEALS: TOTAL
DRESSING REGULAR LOWER BODY CLOTHING: TOTAL
DAILY ACTIVITIY SCORE: 24
MOVING FROM LYING ON BACK TO SITTING ON SIDE OF FLAT BED WITH BEDRAILS: TOTAL
DRESSING REGULAR LOWER BODY CLOTHING: TOTAL
STANDING UP FROM CHAIR USING ARMS: TOTAL
WALKING IN HOSPITAL ROOM: TOTAL
EATING MEALS: TOTAL
TURNING FROM BACK TO SIDE WHILE IN FLAT BAD: TOTAL
EATING MEALS: TOTAL
MOBILITY SCORE: 6
HELP NEEDED FOR BATHING: TOTAL
MOVING TO AND FROM BED TO CHAIR: TOTAL
EATING MEALS: A LITTLE
MOVING FROM LYING ON BACK TO SITTING ON SIDE OF FLAT BED WITH BEDRAILS: TOTAL
TURNING FROM BACK TO SIDE WHILE IN FLAT BAD: TOTAL
CLIMB 3 TO 5 STEPS WITH RAILING: TOTAL

## 2023-01-01 ASSESSMENT — PAIN SCALES - GENERAL
PAINLEVEL_OUTOF10: 0 - NO PAIN
PAINLEVEL: 0-NO PAIN
PAINLEVEL_OUTOF10: 0 - NO PAIN

## 2023-01-01 ASSESSMENT — ACTIVITIES OF DAILY LIVING (ADL)
ASSISTIVE_DEVICE: EYEGLASSES
WALKS IN HOME: DEPENDENT
JUDGMENT_ADEQUATE_SAFELY_COMPLETE_DAILY_ACTIVITIES: NO
JUDGMENT_ADEQUATE_SAFELY_COMPLETE_DAILY_ACTIVITIES: YES
TOILETING: DEPENDENT
BATHING: INDEPENDENT
PATIENT'S MEMORY ADEQUATE TO SAFELY COMPLETE DAILY ACTIVITIES?: NO
HEARING - LEFT EAR: FUNCTIONAL
TOILETING: INDEPENDENT
LACK_OF_TRANSPORTATION: NO
HEARING - RIGHT EAR: FUNCTIONAL
BATHING: DEPENDENT
ASSISTIVE_DEVICE: EYEGLASSES
ADEQUATE_TO_COMPLETE_ADL: NO
WALKS IN HOME: INDEPENDENT
HEARING - RIGHT EAR: UNABLE TO ASSESS
LACK_OF_TRANSPORTATION: NO
GROOMING: DEPENDENT
DRESSING YOURSELF: DEPENDENT
GROOMING: INDEPENDENT
LACK_OF_TRANSPORTATION: NO
PATIENT'S MEMORY ADEQUATE TO SAFELY COMPLETE DAILY ACTIVITIES?: YES
FEEDING YOURSELF: DEPENDENT
DRESSING YOURSELF: INDEPENDENT
HEARING - LEFT EAR: UNABLE TO ASSESS
FEEDING YOURSELF: INDEPENDENT
ADEQUATE_TO_COMPLETE_ADL: YES

## 2023-01-01 ASSESSMENT — PAIN - FUNCTIONAL ASSESSMENT
PAIN_FUNCTIONAL_ASSESSMENT: CPOT (CRITICAL CARE PAIN OBSERVATION TOOL)
PAIN_FUNCTIONAL_ASSESSMENT: 0-10
PAIN_FUNCTIONAL_ASSESSMENT: 0-10
PAIN_FUNCTIONAL_ASSESSMENT: CPOT (CRITICAL CARE PAIN OBSERVATION TOOL)
PAIN_FUNCTIONAL_ASSESSMENT: 0-10
PAIN_FUNCTIONAL_ASSESSMENT: CPOT (CRITICAL CARE PAIN OBSERVATION TOOL)
PAIN_FUNCTIONAL_ASSESSMENT: 0-10
PAIN_FUNCTIONAL_ASSESSMENT: CPOT (CRITICAL CARE PAIN OBSERVATION TOOL)
PAIN_FUNCTIONAL_ASSESSMENT: 0-10
PAIN_FUNCTIONAL_ASSESSMENT: CPOT (CRITICAL CARE PAIN OBSERVATION TOOL)

## 2023-01-01 ASSESSMENT — LIFESTYLE VARIABLES
AUDIT-C TOTAL SCORE: 1
AUDIT-C TOTAL SCORE: 1
HOW MANY STANDARD DRINKS CONTAINING ALCOHOL DO YOU HAVE ON A TYPICAL DAY: 1 OR 2
SKIP TO QUESTIONS 9-10: 1
AUDIT-C TOTAL SCORE: 1
HOW OFTEN DO YOU HAVE 6 OR MORE DRINKS ON ONE OCCASION: NEVER
AUDIT-C TOTAL SCORE: 0
HOW OFTEN DO YOU HAVE A DRINK CONTAINING ALCOHOL: MONTHLY OR LESS

## 2023-01-01 ASSESSMENT — PATIENT HEALTH QUESTIONNAIRE - PHQ9
1. LITTLE INTEREST OR PLEASURE IN DOING THINGS: NOT AT ALL
1. LITTLE INTEREST OR PLEASURE IN DOING THINGS: NOT AT ALL
SUM OF ALL RESPONSES TO PHQ9 QUESTIONS 1 & 2: 0
SUM OF ALL RESPONSES TO PHQ9 QUESTIONS 1 AND 2: 0
2. FEELING DOWN, DEPRESSED OR HOPELESS: NOT AT ALL
2. FEELING DOWN, DEPRESSED OR HOPELESS: NOT AT ALL

## 2023-01-01 ASSESSMENT — COLUMBIA-SUICIDE SEVERITY RATING SCALE - C-SSRS
6. HAVE YOU EVER DONE ANYTHING, STARTED TO DO ANYTHING, OR PREPARED TO DO ANYTHING TO END YOUR LIFE?: NO
6. HAVE YOU EVER DONE ANYTHING, STARTED TO DO ANYTHING, OR PREPARED TO DO ANYTHING TO END YOUR LIFE?: NO
2. HAVE YOU ACTUALLY HAD ANY THOUGHTS OF KILLING YOURSELF?: NO
2. HAVE YOU ACTUALLY HAD ANY THOUGHTS OF KILLING YOURSELF?: NO
1. IN THE PAST MONTH, HAVE YOU WISHED YOU WERE DEAD OR WISHED YOU COULD GO TO SLEEP AND NOT WAKE UP?: NO
1. IN THE PAST MONTH, HAVE YOU WISHED YOU WERE DEAD OR WISHED YOU COULD GO TO SLEEP AND NOT WAKE UP?: NO

## 2023-01-01 ASSESSMENT — ENCOUNTER SYMPTOMS
SHORTNESS OF BREATH: 1
COUGH: 1

## 2023-02-03 PROBLEM — G47.33 OBSTRUCTIVE SLEEP APNEA: Status: ACTIVE | Noted: 2023-01-01

## 2023-02-03 PROBLEM — I12.0 CKD STAGE 5 SECONDARY TO HYPERTENSION (MULTI): Status: ACTIVE | Noted: 2023-01-01

## 2023-02-03 PROBLEM — E78.5 DYSLIPIDEMIA (HIGH LDL; LOW HDL): Status: ACTIVE | Noted: 2023-01-01

## 2023-02-03 PROBLEM — I73.9 PVD (PERIPHERAL VASCULAR DISEASE) (CMS-HCC): Status: ACTIVE | Noted: 2023-01-01

## 2023-02-03 PROBLEM — Z95.0 H/O CARDIAC PACEMAKER: Status: ACTIVE | Noted: 2023-01-01

## 2023-02-03 PROBLEM — E78.6 LOW HDL (UNDER 40): Status: ACTIVE | Noted: 2023-01-01

## 2023-02-03 PROBLEM — R94.31 ABNORMAL EKG: Status: ACTIVE | Noted: 2023-01-01

## 2023-02-03 PROBLEM — D64.9 ANEMIA: Status: ACTIVE | Noted: 2023-01-01

## 2023-02-03 PROBLEM — E78.5 HLD (HYPERLIPIDEMIA): Status: ACTIVE | Noted: 2023-01-01

## 2023-02-03 PROBLEM — I10 BENIGN ESSENTIAL HTN: Status: ACTIVE | Noted: 2023-01-01

## 2023-02-03 PROBLEM — R80.9 PROTEINURIA: Status: ACTIVE | Noted: 2023-01-01

## 2023-02-03 PROBLEM — H04.123 DRY EYES: Status: ACTIVE | Noted: 2023-01-01

## 2023-02-03 PROBLEM — N18.5 CKD STAGE 5 SECONDARY TO HYPERTENSION (MULTI): Status: ACTIVE | Noted: 2023-01-01

## 2023-02-03 PROBLEM — K26.9 DUODENAL ULCER: Status: ACTIVE | Noted: 2023-01-01

## 2023-02-03 PROBLEM — N25.81 SECONDARY RENAL HYPERPARATHYROIDISM (MULTI): Status: ACTIVE | Noted: 2023-01-01

## 2023-02-03 PROBLEM — E55.9 VITAMIN D DEFICIENCY: Status: ACTIVE | Noted: 2023-01-01

## 2023-02-03 PROBLEM — M10.9 GOUT: Status: ACTIVE | Noted: 2023-01-01

## 2023-03-17 NOTE — PROGRESS NOTES
Medicare Wellness Exam    The patent is being seen for a follow up annual wellness visit  Past Medical, Surgical and family History: Reviewed and updated in chart  Interval History: Patient has been hospitalized previously  Medications and Supplements: Review of all medications by a prescribing practitioner or clinical pharmacist (such as prescriptions, OTC, Herbal therapies and supplements) documented in the medical record.    Patient Self-Assessment of health: Excellent  Tobacco Use: No  Alcohol Use: Yes  Illicit drug use: No  Patient using opioids: No    Current Diet: well balanced  Adequate fluid intake: Yes  Caffeine intake: Yes  Exercise frequency: moderately active    Depression/Suicide screening:  PHQ2/ PHQ9 (see screenings tab)    Hearing impairment: No  Uses hearing aids N/A  Cognitive impairment Observation: No   Patient or family reported cognitive impairment: No    Bathing: independent  Dressing: independent  Walking: independent  Taking Medications: independent  Feeding: independent  Personal Hygiene: independent  Managing Finances: independent  Shopping: independent  Housework/Basic Home Maintenance: independent  Handling transportation: independent  Preparing meals: independent    Bowels: continent  Bladder: continent    Falls Risk: has notfallen in last 6 months.    Home Safety Risk Factors: Home Safety Risk Factors: None  Advanced Directives:  Living will: Yes POA: Yes    Patient's End of Life Decisions: Provider agree to follow.  Flu shot Up to date  PPSV23 Up to date  PCV13 Up to date  PCV20 N/A  Shingrix Up to date  Colon cancer screening Up to date  Lung cancer screening N/A  AAA screening Up to date  HIV screening N/A  Prostate cancer screening  Ordered    HPI   H/O Anemia.  Condition(s) stable.  Taking med(s) as directed.  Requests refills..  No acute complaints at this time.   Review of Systems   All other systems reviewed and are negative.  Objective   /55   Pulse 93   Temp 36.6 °C  "(97.9 °F)   Resp 18   Ht 1.651 m (5' 5\")   Wt 82.1 kg (181 lb)   SpO2 92%   BMI 30.12 kg/m²   Physical Exam  Constitutional: Obese.  NAD.  Psych: A&O x3.  Normal affect.  Musculoskeletal: Ambulating w/out assistance.   Assessment/Plan   Diagnoses and all orders for this visit:  Medicare annual wellness visit, subsequent  Anemia, unspecified type  -     iron aspgly,[id number] (Ferrex 150 Forte Plus) 150-60-25-1 mg-mg-mcg-mg capsule; Take 1 tablet by mouth once daily.  Dyslipidemia (high LDL; low HDL)  -     Lipid Panel; Future  Gout, unspecified cause, unspecified chronicity, unspecified site  -     Uric Acid; Future  Prostate cancer screening  -     Prostate Specific Antigen, Screen; Future  Benign prostatic hyperplasia, unspecified whether lower urinary tract symptoms present  -     Prostate Specific Antigen, Screen; Future    Fasting labs.  Refilled medication.    F/U 6 months: Med refills.  "

## 2023-04-06 NOTE — PROGRESS NOTES
Discharge Facility:[facility]  Discharge Diagnosis:pulmonary edema  Admission Date:04/02/23  Discharge Date:04/05/23    PCP appt: 04/111/23

## 2023-04-14 NOTE — PROGRESS NOTES
"Subjective   Patient ID: Pedro Arzate is a 73 y.o. male who presents for Hospital Follow-up.    HPI   Hospitalized 4/2/23 to 4/5/23 for pulm edema after using a different dialysate solution (was out of his normal solution).  Patient was diuresed and underwent peritoneal dialysis.  Transfused 1 unit PRBCs due to drop in H/H.  Has some deconditioning since hospital admission.  No other complaints since hospital discharge.  Review of Systems   All other systems reviewed and are negative.  Objective   /54   Pulse 87   Temp 36.3 °C (97.3 °F)   Resp 16   Ht 1.651 m (5' 5\")   Wt 83.9 kg (185 lb)   SpO2 96%   BMI 30.79 kg/m²   Physical Exam  Constitutional:       General: He is not in acute distress.     Appearance: He is obese.   Eyes:      Conjunctiva/sclera: Conjunctivae normal.   Cardiovascular:      Rate and Rhythm: Normal rate and regular rhythm.      Heart sounds: Murmur (@RUSB) heard.      Systolic murmur is present with a grade of 2/6.   Pulmonary:      Effort: Pulmonary effort is normal.      Breath sounds: Normal breath sounds. No wheezing, rhonchi or rales.   Neurological:      Mental Status: He is oriented to person, place, and time.   Psychiatric:         Mood and Affect: Mood normal.         Behavior: Behavior normal.     Assessment/Plan   Diagnoses and all orders for this visit:  Hospital discharge follow-up  Acute pulmonary edema (CMS/HCC)  CKD stage 5 secondary to hypertension (CMS/HCC)  -     Basic Metabolic Panel; Future  Anemia, unspecified type  -     CBC; Future    Nonfasting labs.  Follow up w/nephrology as advised.    F/U 5 months as previously advised: Med refills.  "

## 2023-04-14 NOTE — PATIENT INSTRUCTIONS
Nonfasting labs.  Follow up w/nephrology as advised.    F/U 5 months as previously advised: Med refills.

## 2023-05-09 NOTE — PROGRESS NOTES
Unable to reach patient for call back after patient's follow up appointment with PCP.   MALLORYM with call back number for patient to call if needed   If no voicemail available call attempts x 2 were made to contact the patient to assist with any questions or concerns patient may have.

## 2023-06-19 NOTE — PROGRESS NOTES
"Subjective   Patient ID: Pedro Arzate is a 73 y.o. male who presents for Dry eyes.    HPI   H/O Dry eyes.  Seen at [facility] (records not available).  Tx w/Restasis (no improvement).  No other complaints at this time.    Review of Systems   All other systems reviewed and are negative.    Objective   /54   Pulse 72   Resp 16   Ht 1.651 m (5' 5\")   Wt 87.4 kg (192 lb 9.6 oz)   SpO2 96%   BMI 32.05 kg/m²     Physical Exam  Constitutional:       General: He is not in acute distress.     Appearance: He is obese.   Eyes:      Conjunctiva/sclera: Conjunctivae normal.   Neurological:      Mental Status: He is oriented to person, place, and time.   Psychiatric:         Mood and Affect: Mood normal.         Behavior: Behavior normal.     Assessment/Plan   Diagnoses and all orders for this visit:  Dry eyes    Follow up with eye clinic for dry eyes.    F/U 3 months as previously advised: Med refills.   "

## 2023-09-21 NOTE — PROGRESS NOTES
Discharge Facility:  [facility]  Discharge Diagnosis:   Peritonitis associated with peritoneal dialysis  Admission Date:  9/13/23  Discharge Date: 9//19/23    PCP Appointment Date:  none noted    Specialist Appointment Date:  none noted    Hospital Encounter and Summary: Linked

## 2023-11-08 PROBLEM — I50.43 CHF (CONGESTIVE HEART FAILURE), NYHA CLASS I, ACUTE ON CHRONIC, COMBINED (MULTI): Status: ACTIVE | Noted: 2023-01-01

## 2023-11-08 NOTE — PROGRESS NOTES
Home with wife     11/08/23 5294   Current Planned Discharge Disposition   Current Planned Discharge Disposition Home

## 2023-11-08 NOTE — ED TRIAGE NOTES
TRIAGE NOTE   I saw the patient as the Clinician in Triage and performed a brief history and physical exam, established acuity, and ordered appropriate tests to develop basic plan of care. Patient will be seen by an TANJA, resident and/or physician who will independently evaluate the patient. Please see subsequent provider notes for further details and disposition.     Brief HPI: In brief, Pedro Arzate is a 73 y.o. male with a history of hypertension, pacemaker, and CKD on daily peritoneal dialysis at home presents with concern for generalized unwell feeling and shortness of breath.  Patient states that he was doing his dialysis session last night when he woke up and noticed that his session was incomplete and something was wrong with the machine.  His blood was still dwelling in the machine.  He called the tech who helped him to empty the catheter but the patient states he still feels unwell.  He reports also feeling short of breath and having a new cough that presented this morning.  He has an appointment tomorrow with one of his physicians regarding his dialysis.  No other complaints.  Reports that he had history of pneumonia in January and an abdominal infection in September.      Focused Physical exam:   Breath sounds clear bilaterally, abdomen is slightly distended per patient but nontender.  Peritoneal dialysis catheter is intact.    Plan/MDM:   Brief differential includes acute heart failure exacerbation, pneumonia, bronchitis, COVID, flu, ACS, electrolyte abnormality, acute kidney injury.  Will obtain swabs, labs as ordered by nursing, chest x-ray, and EKG.    Please see subsequent provider note for further details and disposition

## 2023-11-08 NOTE — PROGRESS NOTES
Transitional Care Coordination Progress Note:  Plan per Medical/Surgical team: treatment of generalized unwell feeling and shortness of breath.  Patient states that he was doing his dialysis session last night when he woke up and noticed that his session was incomplete and something was wrong with the machine.  His blood was still dwelling in the machine. Started on heparin gtt.  Status: ED  Payor source: [facility]  Discharge disposition: home with wife  Potential Barriers: peritoneal dialysis, trop 312, 472  ADOD: 11/10/2023  TESSA Deluca RN, BSN Transitional Care Coordinator ED# [phone]      11/08/23 1514   Discharge Planning   Living Arrangements Spouse/significant other   Support Systems Spouse/significant other   Assistance Needed peritoneal dialysis   Type of Residence Private residence   Number of Stairs to Enter Residence 0   Number of Stairs Within Residence 0   Do you have animals or pets at home? No   Home or Post Acute Services None   Patient expects to be discharged to: home with wife   Does the patient need discharge transport arranged? Yes   RoundTrip coordination needed? Yes   Has discharge transport been arranged? No   Financial Resource Strain   How hard is it for you to pay for the very basics like food, housing, medical care, and heating? Not hard   Housing Stability   In the last 12 months, was there a time when you were not able to pay the mortgage or rent on time? N   In the last 12 months, how many places have you lived? 1   In the last 12 months, was there a time when you did not have a steady place to sleep or slept in a shelter (including now)? N   Transportation Needs   In the past 12 months, has lack of transportation kept you from medical appointments or from getting medications? no   In the past 12 months, has lack of transportation kept you from meetings, work, or from getting things needed for daily living? No

## 2023-11-08 NOTE — H&P
Pedro Arzate is a 73 y.o. male   Shortness of Breath    Cough  Associated symptoms include shortness of breath.      Patient with a past medical history of end-stage renal disease on peritoneal dialysis at home hypertension dyslipidemia obstructive sleep apnea history of complete heart block status post pacemaker hyperparathyroidism and anemia of chronic disease comes in with increasing shortness of breath and fluid overload after his PD catheter dialysis machine apparently malfunction  Patient says that the machine was not emptying the fluid all the way and he was getting more bloated with increasing shortness of breath and lower extremity edema  Came to the emergency room where he was given Lasix IV and work-up also showed elevated troponin and congestive heart failure  As a result of the elevated troponin ED started him on a heparin drip    Past Medical History  Past Medical History:   Diagnosis Date    Abnormal findings on diagnostic imaging of other specified body structures 05/27/2021    Abnormal CXR    Abnormal weight gain 05/01/2017    Abnormal weight gain    Acute kidney failure, unspecified (CMS/[facility]) 10/24/2016    Acute kidney injury    Chronic kidney disease, stage 3 unspecified (CMS/[facility]) 03/08/2019    Chronic kidney disease (CKD), stage III (moderate)    Cyst of kidney, acquired 08/07/2015    Renal cyst    Disorder of kidney and ureter, unspecified 07/07/2020    Acute on chronic renal insufficiency    Encounter for immunization 09/25/2018    Encounter for immunization    Essential (primary) hypertension 07/09/2015    Benign essential hypertension    Hyperglycemia, unspecified 06/02/2017    Borderline hyperglycemia    Hypersomnia, unspecified 02/17/2017    Excessive sleepiness    Hypertensive chronic kidney disease with stage 1 through stage 4 chronic kidney disease, or unspecified chronic kidney disease 02/22/2022    CKD stage 4 secondary to hypertension    Hypertensive chronic kidney disease with stage  1 through stage 4 chronic kidney disease, or unspecified chronic kidney disease 02/22/2022    CKD stage 4 secondary to hypertension    Hypertensive chronic kidney disease with stage 1 through stage 4 chronic kidney disease, or unspecified chronic kidney disease 02/22/2022    CKD stage 4 secondary to hypertension    Hypertensive chronic kidney disease with stage 1 through stage 4 chronic kidney disease, or unspecified chronic kidney disease 02/22/2022    CKD stage 4 secondary to hypertension    Hypertensive heart disease without heart failure 08/07/2015    LVH (left ventricular hypertrophy) due to hypertensive disease    Hypoxemia 10/17/2022    Hypoxia    Olecranon bursitis, right elbow 03/22/2016    Olecranon bursitis of right elbow    Other chest pain 06/27/2016    Chest wall pain    Other forms of dyspnea 10/17/2022    Dyspnea on exertion    Pain in leg, unspecified 09/26/2017    Lower extremity pain    Pain in right leg 10/02/2019    Bilateral leg and foot pain    Pain in unspecified elbow 02/01/2016    Elbow pain    Personal history of diseases of the skin and subcutaneous tissue 08/04/2020    History of contact dermatitis    Personal history of diseases of the skin and subcutaneous tissue 02/14/2014    History of dermatitis    Personal history of other diseases of the circulatory system 01/21/2015    History of cardiac murmur    Personal history of other diseases of the circulatory system 08/31/2020    History of hypertension    Personal history of other diseases of the circulatory system 09/27/2021    History of complete atrioventricular block    Personal history of other diseases of the nervous system and sense organs 06/30/2021    History of acute conjunctivitis    Personal history of other diseases of the respiratory system 11/30/2022    History of pulmonary edema    Personal history of other endocrine, nutritional and metabolic disease 10/04/2019    History of obesity    Personal history of other  endocrine, nutritional and metabolic disease 10/25/2016    History of hyperkalemia    Personal history of other infectious and parasitic diseases 05/23/2017    History of viral infection    Personal history of other specified conditions 02/17/2017    History of snoring    Personal history of other specified conditions 04/16/2020    History of dysuria    Personal history of other specified conditions 09/27/2021    History of syncope    Personal history of other specified conditions 09/26/2017    History of itching    Personal history of other specified conditions 05/11/2021    History of bradycardia    Personal history of pneumonia (recurrent) 11/13/2022    History of pneumonia    Prediabetes 02/22/2022    Prediabetes    Shortness of breath 10/17/2022    Shortness of breath at rest    Tinea unguium 12/16/2019    Mycotic toenails       Surgical History  Past Surgical History:   Procedure Laterality Date    CATARACT EXTRACTION  09/25/2018    Cataract Surgery    OTHER SURGICAL HISTORY  09/27/2021    Pacemaker insertion        Social History  He reports that he has quit smoking. His smoking use included cigarettes. He has never used smokeless tobacco. He reports current alcohol use of about 1.0 standard drink of alcohol per week. He reports that he does not use drugs.    Family History  Family History   Problem Relation Name Age of Onset    Dementia Mother      Kidney failure Father          end stage        Allergies  Patient has no known allergies.    Review of Systems   Respiratory:  Positive for cough and shortness of breath.         Constitutional: Feeling poorly  Eyes: no blurred vision and no diplopia.   ENT: no hearing loss, no tinnitus, no earache, no sore throat, no hoarseness and no swollen glands in the neck.   Cardiovascular: no chest pain, no tightness or heavy pressure,   Respiratory: no cough, wheezing   Gastrointestinal: no change in bowel habits, no diarrhea, no constipation, no bloody stools, no  nausea, no vomiting, no abdominal pain, no signs and symptoms of ulcer disease, no jessica colored stools and no intolerance to fatty foods.   Genitourinary: no urinary frequency, no dysuria, no hematuria, no burning sensation during urination, urinary stream is not smaller and urinary stream does not start and stop.   Musculoskeletal: no arthralgias, no joint stiffness, no muscle weakness, no back pain and no difficulty walking.   Skin: no rashes, no change in skin color and pigmentation, no skin lesions and no skin lumps.   Neurological: no headaches, no dizziness, no seizures, no tingling, no numbness, no signs and symptoms of stroke and no limb weakness.   Psychiatric: no confusion, no memory lapses or loss, no depression and no sleep disturbances.   Endocrine: no goiter, no thyroid disorder, no diabetes mellitus, no excessive thirst, no dry skin, no cold intolerance, no heat intolerance and no increased urinary frequency.   Hematologic/Lymphatic: is not slow to heal, does not bleed easily, does not bruise easily, no thrombophlebitis, no anemia and no history of blood transfusion.   All other systems have been reviewed and are negative for complaint.     Vitals:    11/08/23 1706   BP: 169/65   Pulse: 88   Resp: 18   Temp: 36.6 °C (97.8 °F)   SpO2: 99%        Scheduled medications  allopurinol, 100 mg, oral, Daily  amLODIPine, 10 mg, oral, Daily  aspirin, 81 mg, oral, Daily  atorvastatin, 40 mg, oral, Nightly  calcium polycarbophiL, 625 mg, oral, Daily  [START ON 11/9/2023] ergocalciferol, 1,250 mcg, oral, Weekly  labetalol, 2 tablet, oral, BID  melatonin, 3 mg, oral, Daily  multivitamin with minerals, 1 tablet, oral, Daily  pantoprazole, 40 mg, oral, BID  [START ON 11/9/2023] pantoprazole, 40 mg, intravenous, Daily before breakfast  polyethylene glycol, 17 g, oral, Daily  sevelamer carbonate, 800 mg, oral, 4x daily  sodium bicarbonate, 650 mg, oral, BID      Continuous medications  heparin, 0-4,000 Units/hr, Last  Rate: 900 Units/hr (11/08/23 1500)      PRN medications  PRN medications: acetaminophen **OR** acetaminophen **OR** acetaminophen, albuterol, docusate sodium, guaiFENesin, heparin, ondansetron **OR** ondansetron    Results from last 7 days   Lab Units 11/08/23  1232   WBC AUTO x10*3/uL 7.8   HEMOGLOBIN g/dL 9.5*   HEMATOCRIT % 30.5*   PLATELETS AUTO x10*3/uL 240     Results from last 7 days   Lab Units 11/08/23  1232   SODIUM mmol/L 141   POTASSIUM mmol/L 4.8   CHLORIDE mmol/L 100   CO2 mmol/L 31   BUN mg/dL 66*   CREATININE mg/dL 12.14*   CALCIUM mg/dL 7.8*   PROTEIN TOTAL g/dL 6.2*   BILIRUBIN TOTAL mg/dL 0.3   ALK PHOS U/L 70   ALT U/L 11   AST U/L 22   GLUCOSE mg/dL 91     Results from last 7 days   Lab Units 11/08/23  1501 11/08/23  1356 11/08/23  1232   TROPHS ng/L 584* 472* 312*        XR chest 2 views   Final Result   Hazy opacification of the lungs perhaps related to edema or   pneumonitis.             Signed by: Vinny Mckeon 11/8/2023 1:17 PM   Dictation workstation:   [id number]      Cardiac device check - Inpatient    (Results Pending)       Physical Exam     Constitutional   General appearance: Alert and in no acute distress.   Eyes   Inspection of eyes: Sclera and conjunctiva were normal.    Pupil exam: Pupils were equal in size. Extraocular movements were intact.   Pulmonary   Respiratory assessment: No respiratory distress, normal respiratory rhythm and effort.    Auscultation of Lungs: Crackles  Cardiovascular   Auscultation of heart: Apical pulse normal, heart rate and rhythm normal, normal S1 and S2, no murmurs and no pericardial rub.    Exam for edema: Plus edema  Abdomen   Abdominal Exam: Distended abdomen nontender  Liver and Spleen exam: No hepato-splenomegaly.   Musculoskeletal   Examination of gait: Normal.    Inspection of digits and nails: No clubbing or cyanosis of the fingernails.    Inspection/palpation of joints, bones and muscles: No joint swelling. Normal movement of all  extremities.   Skin   Skin inspection: Normal skin color and pigmentation, normal skin turgor and no visible rash.   Neurologic   Cranial nerves: Nerves 2-12 were intact, no focal neuro defects.   Psychiatric   Orientation: Oriented to person, place, and time.    Mood and affect: Normal.       Assessment/Plan      #Acute fluid overload because of malfunctioning PD catheter machine  Responded well to Lasix  Nephrology consult to initiate peritoneal dialysis and to address his home PD issues    #Elevated troponin  Demand ischemia  Started on heparin drip by ED  We will defer to cardiology but in my opinion we can stop the heparin drip    #Hypertension  Stable  Continue current medications    #Dyslipidemia  Stable  Continue home medications

## 2023-11-08 NOTE — ED PROVIDER NOTES
HPI   Chief Complaint   Patient presents with    Shortness of Breath    Cough       HPI: 73-year-old male with a history of ESRD on peritoneal dialysis and CHF presents with shortness of breath.  He states that he undergoes peritoneal dialysis at night.  When he woke up this morning he found that the machine may have malfunctioned and did not take off an appropriate amount of fluid.  During this time he felt short of breath.  He denies chest pain, fever, chills.      Limitations to history: None  Independent Historians: Patient, wife  External Records Reviewed: HIE, outpatient notes, inpatient notes  ------------------------------------------------------------------------------------------------------------------------------------------  ROS: a ten point review of systems was performed and was negative except as per HPI.  ------------------------------------------------------------------------------------------------------------------------------------------  PMH / PSH: as per HPI, otherwise reviewed in EMR  MEDS: as per HPI, otherwise reviewed in EMR  ALLERGIES: as per HPI, otherwise reviewed in EMR  SocH:  as per HPI, otherwise reviewed in EMR  FH:  as per HPI, otherwise reviewed in EMR  ------------------------------------------------------------------------------------------------------------------------------------------  Physical Exam:  VS: As documented in the triage note and EMR flowsheet from this visit was reviewed  General: Well appearing. No acute distress.   Eyes:  Extraocular movements grossly intact. No scleral icterus. No discharge  HEENT:  Normocephalic.  Atraumatic  Neck: Moves neck freely. No gross masses  CV: Regular rhythm. No murmurs, rubs or gallops   Resp: Clear to auscultation bilaterally. No respiratory distress.    GI: Soft, no masses, nontender. No rebound tenderness or guarding.  Abdomen is moderately distended  MSK: Symmetric muscle bulk. No deformities. No lower extremity edema.     Skin: Warm, dry, intact.   Neuro: No focal deficits.  A&O x3.   Psych: Appropriate for situation  ------------------------------------------------------------------------------------------------------------------------------------------  Hospital Course / Medical Decision Making:  Independent Interpretations: CXR  EKG as interpreted by me: EKG #1 shows a ventricular paced rhythm at 86 bpm with no signs of acute ischemia.  EKG #2 shows a ventricular paced rhythm at 81 bpm with no signs of acute ischemia    MDM: This is a 73-year-old male with a history of CHF and ESRD on peritoneal dialysis presenting with shortness of breath.  He is breathing comfortably on nasal cannula.  No major electrolyte abnormalities.  BNP is elevated at approximately 2300.  Troponin elevated in the 300s and then 400s.  EKG is nonischemic.  He was started on heparin infusion for NSTEMI and was given Lasix.  He states that he does produce some urine.  He is currently denying chest pain.  Patient was admitted to the medicine service for further evaluation.    Discussion of Management with Other Providers:   I discussed the patient/results with: Emergency medicine team    Final diagnosis and disposition as below.    Results for orders placed or performed during the hospital encounter of 11/08/23  -Influenza A/B,Covid 2019 sPCR, Symptomatic:        Result                      Value             Ref Range           Flu A Result                Not Detected      Not Detected        Flu B Result                Not Detected      Not Detected        Coronavirus 2019, PCR       Not Detected      Not Detected   -CBC with Differential:        Result                      Value             Ref Range           WBC                         7.8               4.4 - 11.3 x*       nRBC                        0.0               0.0 - 0.0 /1*       RBC                         3.31 (L)          4.50 - 5.90 *       Hemoglobin                  9.5 (L)           13.5 -  17.5 *       Hematocrit                  30.5 (L)          41.0 - 52.0 %       MCV                         92                80 - 100 fL         MCH                         28.7              26.0 - 34.0 *       MCHC                        31.1 (L)          32.0 - 36.0 *       RDW                         14.9 (H)          11.5 - 14.5 %       Platelets                   240               150 - 450 x1*       Neutrophils %               78.0              40.0 - 80.0 %       Immature Granulocytes *     0.4               0.0 - 0.9 %         Lymphocytes %               12.7              13.0 - 44.0 %       Monocytes %                 6.5               2.0 - 10.0 %        Eosinophils %               1.9               0.0 - 6.0 %         Basophils %                 0.5               0.0 - 2.0 %         Neutrophils Absolute        6.08 (H)          1.60 - 5.50 *       Immature Granulocytes *     0.03              0.00 - 0.50 *       Lymphocytes Absolute        0.99              0.80 - 3.00 *       Monocytes Absolute          0.51              0.05 - 0.80 *       Eosinophils Absolute        0.15              0.00 - 0.40 *       Basophils Absolute          0.04              0.00 - 0.10 *  -Comprehensive Metabolic Panel:        Result                      Value             Ref Range           Glucose                     91                74 - 99 mg/dL       Sodium                      141               136 - 145 mm*       Potassium                   4.8               3.5 - 5.3 mm*       Chloride                    100               98 - 107 mmo*       Bicarbonate                 31                21 - 32 mmol*       Anion Gap                   15                10 - 20 mmol*       Urea Nitrogen               66 (H)            6 - 23 mg/dL        Creatinine                  12.14 (H)         0.50 - 1.30 *       eGFR                        4 (L)             >60 mL/min/1*       Calcium                     7.8 (L)           8.6 -  10.3 m*       Albumin                     3.1 (L)           3.4 - 5.0 g/*       Alkaline Phosphatase        70                33 - 136 U/L        Total Protein               6.2 (L)           6.4 - 8.2 g/*       AST                         22                9 - 39 U/L          Bilirubin, Total            0.3               0.0 - 1.2 mg*       ALT                         11                10 - 52 U/L    -Brain Natriuretic Peptide:        Result                      Value             Ref Range           BNP                         2,327 (H)         0 - 99 pg/mL   -Troponin I, High Sensitivity:        Result                      Value             Ref Range           Troponin I, High Sensi*     312 (HH)          0 - 20 ng/L    -Troponin I, High Sensitivity:        Result                      Value             Ref Range           Troponin I, High Sensi*     472 (HH)          0 - 20 ng/L    XR chest 2 views   Final Result    Hazy opacification of the lungs perhaps related to edema or    pneumonitis.                Signed by: Vinny Mckeon 11/8/2023 1:17 PM    Dictation workstation:   [id number]                               No data recorded                Patient History   Past Medical History:   Diagnosis Date    Abnormal findings on diagnostic imaging of other specified body structures 05/27/2021    Abnormal CXR    Abnormal weight gain 05/01/2017    Abnormal weight gain    Acute kidney failure, unspecified (CMS/HCC) 10/24/2016    Acute kidney injury    Chronic kidney disease, stage 3 unspecified (CMS/HCC) 03/08/2019    Chronic kidney disease (CKD), stage III (moderate)    Cyst of kidney, acquired 08/07/2015    Renal cyst    Disorder of kidney and ureter, unspecified 07/07/2020    Acute on chronic renal insufficiency    Encounter for immunization 09/25/2018    Encounter for immunization    Essential (primary) hypertension 07/09/2015    Benign essential hypertension    Hyperglycemia, unspecified 06/02/2017    Borderline  hyperglycemia    Hypersomnia, unspecified 02/17/2017    Excessive sleepiness    Hypertensive chronic kidney disease with stage 1 through stage 4 chronic kidney disease, or unspecified chronic kidney disease 02/22/2022    CKD stage 4 secondary to hypertension    Hypertensive chronic kidney disease with stage 1 through stage 4 chronic kidney disease, or unspecified chronic kidney disease 02/22/2022    CKD stage 4 secondary to hypertension    Hypertensive chronic kidney disease with stage 1 through stage 4 chronic kidney disease, or unspecified chronic kidney disease 02/22/2022    CKD stage 4 secondary to hypertension    Hypertensive chronic kidney disease with stage 1 through stage 4 chronic kidney disease, or unspecified chronic kidney disease 02/22/2022    CKD stage 4 secondary to hypertension    Hypertensive heart disease without heart failure 08/07/2015    LVH (left ventricular hypertrophy) due to hypertensive disease    Hypoxemia 10/17/2022    Hypoxia    Olecranon bursitis, right elbow 03/22/2016    Olecranon bursitis of right elbow    Other chest pain 06/27/2016    Chest wall pain    Other forms of dyspnea 10/17/2022    Dyspnea on exertion    Pain in leg, unspecified 09/26/2017    Lower extremity pain    Pain in right leg 10/02/2019    Bilateral leg and foot pain    Pain in unspecified elbow 02/01/2016    Elbow pain    Personal history of diseases of the skin and subcutaneous tissue 08/04/2020    History of contact dermatitis    Personal history of diseases of the skin and subcutaneous tissue 02/14/2014    History of dermatitis    Personal history of other diseases of the circulatory system 01/21/2015    History of cardiac murmur    Personal history of other diseases of the circulatory system 08/31/2020    History of hypertension    Personal history of other diseases of the circulatory system 09/27/2021    History of complete atrioventricular block    Personal history of other diseases of the nervous system and  sense organs 06/30/2021    History of acute conjunctivitis    Personal history of other diseases of the respiratory system 11/30/2022    History of pulmonary edema    Personal history of other endocrine, nutritional and metabolic disease 10/04/2019    History of obesity    Personal history of other endocrine, nutritional and metabolic disease 10/25/2016    History of hyperkalemia    Personal history of other infectious and parasitic diseases 05/23/2017    History of viral infection    Personal history of other specified conditions 02/17/2017    History of snoring    Personal history of other specified conditions 04/16/2020    History of dysuria    Personal history of other specified conditions 09/27/2021    History of syncope    Personal history of other specified conditions 09/26/2017    History of itching    Personal history of other specified conditions 05/11/2021    History of bradycardia    Personal history of pneumonia (recurrent) 11/13/2022    History of pneumonia    Prediabetes 02/22/2022    Prediabetes    Shortness of breath 10/17/2022    Shortness of breath at rest    Tinea unguium 12/16/2019    Mycotic toenails     Past Surgical History:   Procedure Laterality Date    CATARACT EXTRACTION  09/25/2018    Cataract Surgery    OTHER SURGICAL HISTORY  09/27/2021    Pacemaker insertion     Family History   Problem Relation Name Age of Onset    Dementia Mother      Kidney failure Father          end stage     Social History     Tobacco Use    Smoking status: Former     Types: Cigarettes    Smokeless tobacco: Never   Substance Use Topics    Alcohol use: Yes     Alcohol/week: 1.0 standard drink of alcohol     Types: 1 Cans of beer per week    Drug use: Never       Physical Exam   ED Triage Vitals   Temp Heart Rate Resp BP   11/08/23 1048 11/08/23 1048 11/08/23 1048 11/08/23 1048   36.8 °C (98.2 °F) 88 20 132/54      SpO2 Temp Source Heart Rate Source Patient Position   11/08/23 1048 11/08/23 1048 11/08/23 1048  11/08/23 1048   94 % Temporal Monitor Sitting      BP Location FiO2 (%)     11/08/23 1048 11/08/23 1353     Left arm 28 %       Physical Exam    ED Course & MDM   Diagnoses as of 12/08/23 1136   CHF (congestive heart failure), NYHA class I, acute on chronic, combined (CMS/[facility])   NSTEMI (non-ST elevated myocardial infarction) (CMS/[facility])       Medical Decision Making      Procedure  Critical Care    Performed by: Rod Abbasi DO  Authorized by: Rod Abbasi DO    Critical care provider statement:     Critical care time (minutes):  35    Critical care was necessary to treat or prevent imminent or life-threatening deterioration of the following conditions: NSTEMI.    Critical care was time spent personally by me on the following activities:  Examination of patient, ordering and review of radiographic studies, ordering and review of laboratory studies and re-evaluation of patient's condition   * Critical care time excludes procedures     Rod Abbasi DO  11/08/23 1536       Rod Abbasi DO  12/08/23 1137

## 2023-11-08 NOTE — PROGRESS NOTES
11/08/23 1513   [facility] Disability Status   Are you deaf or do you have serious difficulty hearing? N   Are you blind or do you have serious difficulty seeing, even when wearing glasses? N   Because of a physical, mental, or emotional condition, do you have serious difficulty concentrating, remembering, or making decisions? (5 years old or older) N   Do you have serious difficulty walking or climbing stairs? Y   Do you have serious difficulty dressing or bathing? Y   Because of a physical, mental, or emotional condition, do you have serious difficulty doing errands alone such as visiting the doctor? Y

## 2023-11-08 NOTE — PROGRESS NOTES
11/08/23 1517   Physical Activity   On average, how many days per week do you engage in moderate to strenuous exercise (like a brisk walk)? 0 days   On average, how many minutes do you engage in exercise at this level? 0 min   Financial Resource Strain   How hard is it for you to pay for the very basics like food, housing, medical care, and heating? Not hard   Housing Stability   In the last 12 months, was there a time when you were not able to pay the mortgage or rent on time? N   In the last 12 months, how many places have you lived? 1   In the last 12 months, was there a time when you did not have a steady place to sleep or slept in a shelter (including now)? N   Transportation Needs   In the past 12 months, has lack of transportation kept you from medical appointments or from getting medications? no   In the past 12 months, has lack of transportation kept you from meetings, work, or from getting things needed for daily living? No   Food Insecurity   Within the past 12 months, you worried that your food would run out before you got the money to buy more. Never true   Within the past 12 months, the food you bought just didn't last and you didn't have money to get more. Never true   Stress   Do you feel stress - tense, restless, nervous, or anxious, or unable to sleep at night because your mind is troubled all the time - these days? To some exte   Social Connections   In a typical week, how many times do you talk on the phone with family, friends, or neighbors? More than 3   How often do you get together with friends or relatives? More than 3   How often do you attend Hinduism or Gnosticism services? Never   Do you belong to any clubs or organizations such as Hinduism groups, unions, fraternal or athletic groups, or school groups? No   How often do you attend meetings of the clubs or organizations you belong to? Never   Are you , , , , never , or living with a partner?     Intimate Partner Violence   Within the last year, have you been afraid of your partner or ex-partner? No   Within the last year, have you been humiliated or emotionally abused in other ways by your partner or ex-partner? No   Within the last year, have you been kicked, hit, slapped, or otherwise physically hurt by your partner or ex-partner? No   Within the last year, have you been raped or forced to have any kind of sexual activity by your partner or ex-partner? No   Alcohol Use   Q1: How often do you have a drink containing alcohol? Never   Q2: How many drinks containing alcohol do you have on a typical day when you are drinking? None   Q3: How often do you have six or more drinks on one occasion? Never   Utilities   In the past 12 months has the electric, gas, oil, or water company threatened to shut off services in your home? No

## 2023-11-08 NOTE — ED TRIAGE NOTES
Pt c/o SOB/cough that started today. Pt also states he was doing his peritoneal dialysis and his machine stopped working.

## 2023-11-09 NOTE — PROGRESS NOTES
Pedro Arzate is a 73 y.o. male     Patient feels better  But continues to complain about a distended abdomen    Review of Systems     Constitutional: no fever, no chills, not feeling poorly, not feeling tired   Cardiovascular: no chest pain   Respiratory: no cough, wheezing or shortness of breath a  Gastrointestinal: no abdominal pain, no constipation, no melena, no nausea, no diarrhea, no vomiting and no blood in stools.   Neurological: no headache,   All other systems have been reviewed and are negative for complaint.       Vitals:    11/09/23 0700   BP: (!) 135/47   Pulse: 74   Resp: 18   Temp: 36.1 °C (96.9 °F)   SpO2: 99%        Scheduled medications  allopurinol, 100 mg, oral, Daily  amLODIPine, 10 mg, oral, Daily  aspirin, 81 mg, oral, Daily  atorvastatin, 40 mg, oral, Nightly  calcium polycarbophiL, 625 mg, oral, Daily  dextrose 2.5 % - LOW calcium 2.5 mEq/L 2,000 mL peritoneal dialysate, , intraperitoneal, TID  dextrose 4.25 % - LOW calcium 2.5 mEq/L peritoneal dialysate, , intraperitoneal, BID  ergocalciferol, 1,250 mcg, oral, Weekly  labetalol, 2 tablet, oral, BID  melatonin, 3 mg, oral, Daily  multivitamin with minerals, 1 tablet, oral, Daily  pantoprazole, 40 mg, intravenous, Daily before breakfast  polyethylene glycol, 17 g, oral, Daily  sevelamer carbonate, 800 mg, oral, 4x daily      Continuous medications  heparin, 0-4,000 Units/hr, Last Rate: 1,100 Units/hr (11/09/23 0000)      PRN medications  PRN medications: acetaminophen **OR** acetaminophen **OR** acetaminophen, albuterol, docusate sodium, guaiFENesin, heparin, ondansetron **OR** ondansetron    Lab Review   Results from last 7 days   Lab Units 11/09/23  0619 11/08/23  1232   WBC AUTO x10*3/uL 5.6 7.8   HEMOGLOBIN g/dL 8.3* 9.5*   HEMATOCRIT % 26.9* 30.5*   PLATELETS AUTO x10*3/uL 200 240     Results from last 7 days   Lab Units 11/09/23  0619 11/08/23  1232   SODIUM mmol/L 141 141   POTASSIUM mmol/L 4.1 4.8   CHLORIDE mmol/L 100 100   CO2  mmol/L 26 31   BUN mg/dL 72* 66*   CREATININE mg/dL 13.02* 12.14*   CALCIUM mg/dL 7.5* 7.8*   PROTEIN TOTAL g/dL  --  6.2*   BILIRUBIN TOTAL mg/dL  --  0.3   ALK PHOS U/L  --  70   ALT U/L  --  11   AST U/L  --  22   GLUCOSE mg/dL 95 91     Results from last 7 days   Lab Units 11/08/23  1501 11/08/23  1356 11/08/23  1232   TROPHS ng/L 584* 472* 312*        Cardiac Device Check - Remote         XR chest 2 views   Final Result   Hazy opacification of the lungs perhaps related to edema or   pneumonitis.             Signed by: Vinny Mckeon 11/8/2023 1:17 PM   Dictation workstation:   [id number]      Cardiac device check - Inpatient    (Results Pending)         Physical Exam     Constitutional   General appearance: Alert and in no acute distress.     Pulmonary   Respiratory assessment: No respiratory distress, normal respiratory rhythm and effort.    Auscultation of Lungs: Clear bilateral breath sounds.   Cardiovascular   Auscultation of heart: Apical pulse normal, heart rate and rhythm normal, normal S1 and S2, no murmurs and no pericardial rub.    Exam for edema: No peripheral edema.   Abdomen   Abdominal Exam: Distended abdomen nontender  Liver and Spleen exam: No hepato-splenomegaly.   Musculoskeletal   Examination of gait: ROM intact  Skin inspection: Normal skin color and pigmentation, normal skin turgor and no visible rash.   Neurologic   Cranial nerves: Nerves 2-12 were intact, no focal neuro defects.     Assessment/Plan      #Fluid overload  #End-stage renal disease on peritoneal dialysis  Responded well to Lasix  .  Dialysis as per nephrology    #Hypertension  Stable  Continue home medications    #Dyslipidemia  Stable  Continue home medications    #Demand ischemia  DC heparin if okay with cardiology

## 2023-11-09 NOTE — CARE PLAN
The patient's goals for the shift include  safety      The clinical goals for the shift include Pt will remain hemodyanmically stable throughout shift.

## 2023-11-09 NOTE — CONSULTS
Inpatient consult to Cardiology  Consult performed by: MIRNA Taveras-CNP  Consult ordered by: Bryce Dewitt MD  Reason for consult: NSTEMI          History Of Present Illness:    Pedro Arzate is a 73 y.o. male with a past medical history of  Diastolic HF LVEF 55-60% (last TTE 6/7/22), end-stage renal disease on peritoneal dialysis (follows with Dr. Mccurdy), obstructive sleep apnea,  hypertension, hyperlipidemia, gout, hyperparathyroidism, anemia, peripheral vascular disease, complete heart block status post dual-chamber pacemaker placement, LBBB, who presents with c/o Sob/ cough. Initial EKG reveals paced rhythm, underlying SR, chest x-ray hazy opacification consistent with edema or pneumonitis, troponins 312, 584 (previous HS [id number]'s).  Cardiology consulted for further evaluation of NSTEMI.    Evaluated patient today here at [facility] [place], patient reports he is here for further evaluation of shortness of breath which started shortly after his peritoneal dialysis machine started malfunctioning yesterday.  He reports that at baseline he has occasional orthopnea, pnd and has stopped wearing his CPAP machine a year ago.  He reports complete compliance with his medication regimen and dialysis therapy.  He denies any associated symptoms of chest pain, palpitations, dizziness, weight gain, lower extremity swelling, lightheadedness, falls or syncopal events.  Currently patient reports breathing has improved and optimal urinary response since he received furosemide 80 mg IV x 1; but he feels tired and currently has supplemental oxygen in place via a nasal cannula 3 L.    He denies any past medical history of cardiac surgeries or coronary percutaneous interventions.    All other systems reviewed and negative unless as mentioned in HPI.      Past Cardiology Tests (Last 3 Years):    Echo 6/7/22:  CONCLUSIONS:   1. The left ventricular systolic function is normal with a 55-60% estimated ejection fraction.   2. Abnormal  septal motion consistent with RV pacemaker.   3. There is mild hypokinesis and dyssynchrony of the anteroseptal wall.   4. There is moderate concentric left ventricular hypertrophy.   5. The left atrium is moderately dilated.   6. There is severe mitral annular calcification.   7. Slightly elevated RVSP.   8. Aortic valve sclerosis.   9. There is mild to moderate aortic valve regurgitation.  10. The estimated PASP is 39 mmHg.  11. The peak instantaneous gradient of the aortic valve is 29.8 mmHg.  12. There is plaque visualized in the ascending aorta    Stress Test:  Results for orders placed in visit on 08/11/22  Nuclear Stress Test  Indication BRIGGS:  IMPRESSION:  1. Relatively normal stress myocardial perfusion imaging in response  to pharmacologic stress in the setting of moderate diaphragmatic  attenuation.  2. Well-maintained left ventricular function      Past Medical History:  End-stage renal disease on peritoneal dialysis   Diastolic HF  Obstructive sleep apnea  Hypertension  Hyperlipidemia  Gout  Hyperparathyroidism  Anemia  Peripheral vascular disease  Complete heart block status post dual-chamber pacemaker placement  Left Bundle Branch Block      Past Surgical History:  He has a past surgical history that includes Other surgical history (09/27/2021) and Cataract extraction (09/25/2018).      Social History:  He reports that he has quit smoking. His smoking use included cigarettes. He has never used smokeless tobacco. He reports current alcohol use of about 1.0 standard drink of alcohol per week. He reports that he does not use drugs.    Family History:  Reviewed, not pertinent to presenting problem      Family History   Problem Relation Name Age of Onset    Dementia Mother      Kidney failure Father          end stage        Allergies:  Patient has no known allergies.    ROS:  10 point review of systems including (Constitutional, Eyes, ENMT, Respiratory, Cardiac, Gastrointestinal, Neurological, Psychiatric,  "and Hematologic) was performed and is otherwise negative.    Objective Data:  Last Recorded Vitals:  Vitals:    23 2058 23 0055 23 0545 23 0700   BP: 119/58 123/58 122/54 (!) 135/47   BP Location: Left arm Left arm Left arm Left arm   Patient Position: Lying Lying Lying Lying   Pulse: 86 78 71 74   Resp: 18 18 18 18   Temp: 36.2 °C (97.1 °F) 36.3 °C (97.3 °F) 36.2 °C (97.1 °F) 36.1 °C (96.9 °F)   TempSrc: Temporal Temporal Temporal Temporal   SpO2: 99% 99% 92% 99%   Weight:       Height:         Medical Gas Therapy: Supplemental oxygen  O2 Delivery Method: Nasal cannula  Weight  Av.6 kg (160 lb)  Min: 72.6 kg (160 lb)  Max: 72.6 kg (160 lb)      LABS:  CMP:  Results from last 7 days   Lab Units 23  0619 23  1232   SODIUM mmol/L 141 141   POTASSIUM mmol/L 4.1 4.8   CHLORIDE mmol/L 100 100   CO2 mmol/L 26 31   ANION GAP mmol/L 19 15   BUN mg/dL 72* 66*   CREATININE mg/dL 13.02* 12.14*   EGFR mL/min/1.73m*2 4* 4*   ALBUMIN g/dL  --  3.1*   ALT U/L  --  11   AST U/L  --  22   BILIRUBIN TOTAL mg/dL  --  0.3     CBC:  Results from last 7 days   Lab Units 23  0619 23  1232   WBC AUTO x10*3/uL 5.6 7.8   HEMOGLOBIN g/dL 8.3* 9.5*   HEMATOCRIT % 26.9* 30.5*   PLATELETS AUTO x10*3/uL 200 240   MCV fL 93 92     COAG:     ABO: No results found for: \"ABO\"  HEME/ENDO:     CARDIAC:   Results from last 7 days   Lab Units 23  1501 23  1356 23  1232   TROPHS ng/L 584* 472* 312*   BNP pg/mL  --   --  2,327*             Last I/O:  No intake or output data in the 24 hours ending 23 1200  Net IO Since Admission: No IO data has been entered for this period [23 1200]      Imaging Results:  XR chest 2 views    Result Date: 2023  Interpreted By:  Vinny Mckeon, STUDY: XR CHEST 2 VIEWS;  2023 12:57 pm   INDICATION: Signs/Symptoms:SOB.   COMPARISON: 2023   ACCESSION NUMBER(S): [id number]   ORDERING CLINICIAN: ROSA CANNON   FINDINGS: Pacemaker. " Interstitial prominence with hazy opacification of the lungs. No apparent pleural effusion. Cardiomegaly. Aortic atherosclerosis.       Hazy opacification of the lungs perhaps related to edema or pneumonitis.     Signed by: Vinny Mckeon 11/8/2023 1:17 PM Dictation workstation:   [id number]      Inpatient Medications:  Scheduled medications   Medication Dose Route Frequency    allopurinol  100 mg oral Daily    amLODIPine  10 mg oral Daily    aspirin  81 mg oral Daily    atorvastatin  40 mg oral Nightly    calcium polycarbophiL  625 mg oral Daily    dextrose 2.5 % - LOW calcium 2.5 mEq/L 2,000 mL peritoneal dialysate   intraperitoneal TID    dextrose 4.25 % - LOW calcium 2.5 mEq/L peritoneal dialysate   intraperitoneal BID    ergocalciferol  1,250 mcg oral Weekly    labetalol  2 tablet oral BID    melatonin  3 mg oral Daily    multivitamin with minerals  1 tablet oral Daily    pantoprazole  40 mg intravenous Daily before breakfast    polyethylene glycol  17 g oral Daily    sevelamer carbonate  800 mg oral 4x daily     PRN medications   Medication    acetaminophen    Or    acetaminophen    Or    acetaminophen    albuterol    docusate sodium    guaiFENesin    heparin    ondansetron    Or    ondansetron     Continuous Medications   Medication Dose Last Rate    heparin  0-4,000 Units/hr 1,100 Units/hr (11/09/23 0000)       Inpatient Medications:  Scheduled medications   Medication Dose Route Frequency    allopurinol  100 mg oral Daily    amLODIPine  10 mg oral Daily    aspirin  81 mg oral Daily    atorvastatin  40 mg oral Nightly    calcium polycarbophiL  625 mg oral Daily    dextrose 2.5 % - LOW calcium 2.5 mEq/L 2,000 mL peritoneal dialysate   intraperitoneal TID    dextrose 4.25 % - LOW calcium 2.5 mEq/L peritoneal dialysate   intraperitoneal BID    ergocalciferol  1,250 mcg oral Weekly    labetalol  2 tablet oral BID    melatonin  3 mg oral Daily    multivitamin with minerals  1 tablet oral Daily    pantoprazole  40  mg intravenous Daily before breakfast    polyethylene glycol  17 g oral Daily    sevelamer carbonate  800 mg oral 4x daily     PRN medications   Medication    acetaminophen    Or    acetaminophen    Or    acetaminophen    albuterol    docusate sodium    guaiFENesin    heparin    ondansetron    Or    ondansetron     Continuous Medications   Medication Dose Last Rate    heparin  0-4,000 Units/hr 1,100 Units/hr (11/09/23 0000)     Outpatient Medications:  Current Outpatient Medications   Medication Instructions    acetaminophen (Tylenol) 325 mg tablet TAKE  2 TABLETS EVERY 4 HOURS AS NEEDED FOR PAIN    albuterol 90 mcg/actuation inhaler 2 puffs, inhalation, Every 4 hours PRN    allopurinol (Zyloprim) 100 mg tablet TAKE 2 TABLETS BY MOUTH DAILY    allopurinol (ZYLOPRIM) 100 mg, oral, 2 times daily    amLODIPine (Norvasc) 10 mg tablet TAKE 1 TABLET DAILY FOR BLOOD PRESSURE.    aspirin 81 mg EC tablet TAKE 1 TABLET DAILY AS DIRECTED.    atorvastatin (Lipitor) 40 mg tablet TAKE 1 TABLET AT BEDTIME.    calcitriol (Rocaltrol) 0.5 mcg capsule TAKE 1 CAPSULE ORALLY 3 TIMES A DAY    docusate sodium (Colace) 100 mg capsule TAKE 1 CAPSULE TWICE DAILY AS NEEDED.    ergocalciferol (Vitamin D-2) 1.25 MG (57562 UT) capsule TAKE 1 CAPSULE WEEKLY.    ergocalciferol (VITAMIN D-2) 50,000 Units, oral, Weekly    hydrALAZINE (Apresoline) 100 mg tablet TAKE 1 TABLET Every twelve hours    labetalol (Normodyne) 200 mg tablet TAKE 1 TABLET TWICE DAILY.    multivitamin with minerals (multivitamin-iron-folic acid) tablet 1 tablet, oral, Daily    mv-mn/folic acid/lutein/izm501 (MARY MULTIVITAMIN FOR MEN ORAL) 1 tablet, oral, Daily    pantoprazole (ProtoNix) 40 mg EC tablet Take 1 tablet twice daily    polyethylene glycol (Glycolax) 17 gram/dose powder MIX 1 CAPFUL IN 8 OUNCES OF WATER AND DRINK AT BEDTIME AS NEEDED FOR CONSTIPATION.    sevelamer carbonate (Renvela) 800 mg tablet Take 1 tablet 4 times a day (before meals and at bedtime)    sodium  bicarbonate 650 mg tablet Take 1 tablet (650 mg) by mouth in the morning and 1 tablet (650 mg) before bedtime.    torsemide (Demadex) 20 mg tablet TAKE 1 TABLET DAILY AS DIRECTED.    torsemide (DEMADEX) 20 mg, oral, 2 times daily       Physical Exam:  General:  Patient is awake, alert, and oriented.  Patient is in no acute distress.  HEENT:  Pupils equal and reactive.  Normocephalic.  Moist mucosa.    Neck:  No thyromegaly.  Normal Jugular Venous Pressure.  Cardiovascular:  Regular rate and rhythm.  Normal S1 and S2.  Pulmonary:  Clear to auscultation bilaterally.  Abdomen: Distended, + bowel sounds, + peritoneal dialysis cath in place  Lower Extremities:  2+ pedal pulses. No LE edema.  Neurologic:  Cranial nerves intact.  No focal deficit.   Skin: Skin warm and dry, normal skin turgor.   Psychiatric: Normal affect.     Assessment/Plan   Pedro Arzate is a 73 y.o. male with a past medical history of  Diastolic HF LVEF 55-60% (last TTE 6/7/22), end-stage renal disease on peritoneal dialysis (follows with Dr. Mccurdy), obstructive sleep apnea,  hypertension, hyperlipidemia, gout, hyperparathyroidism, anemia, peripheral vascular disease, complete heart block status post dual-chamber pacemaker placement, LBBB, who presents with c/o Sob/ cough. Initial EKG reveals paced rhythm, underlying SR, chest x-ray hazy opacification consistent with edema or pneumonitis, troponins 312, 584 (previous HS [id number]'s).  Cardiology consulted for further evaluation of NSTEMI.    I reviewed EKG, paced rhythm with underlying sinus rhythm  Currently not on telemetry  I reviewed all labs and imaging reports    #Concern for UA; probable type II MI in sett of ESRD, Probable Volume Overload d/t Home dialysis equip malfunction. Denies any CP and no new ischemic changes on EKG.   NPO for Coronary CTA to further eval for ischemia    # Acute on chronic Diastolic HF in setting of incomplete volume removal from Peritoneal dialysis d/t malfunctioning  equip.   Patient reports improvement in breathing s/p IV Lasix 80 mg IV x1   Volume management per Nephrology/ PD    # CHB s/p PPM. Device stable by available matrix    #LBBB (noted on EKG 2022)  Stress Test 8/22> nonischemic    #HTN. Acceptable     #HLD. Continue statin    Rec's after discussion with cardiologist Dr. Darvin Valenzuela:  NPO for Coronary CTA today> discussed with pt. And he is in agreement with proceeding  Continue to trend trop until they downtrend  Volume management per Nephrology/ PD  Continue outpt CV meds  C/w Heparin Infusion, ASA, Statin, BB  Patient will benefit from Cardiology follow up with Dr. Quiles post discharge> will request appoint.    Code Status:  Full Code      Thank you for allowing me to participate in their care.  Please feel free to call me with any further questions or concerns.    Darvin Valenzuela MD, [facility], [facility]  Division of Cardiovascular Medicine  Medical Director, [facility] [place]  [facility]  Assistant Clinical Professor, Medicine  [employer]  [email or web address]  Office:  [phone]     I personally reviewed the patient's vital signs, telemetry, recent labs, medications, orders, EKGs, and pertinent cardiac imaging/ echocardiography.  I have reviewed the TANJA's encounter note, approve the TANJA's documentation and have edited the note to reflect my diagnostic and therapeutic plan.

## 2023-11-09 NOTE — CONSULTS
Reason For Consult  ESRD on PD     History Of Present Illness  Pedro Arzate is a 73 y.o. male  with a past medical history of end-stage renal disease on peritoneal dialysis under the care of Dr. Mccurdy, obstructive sleep  apnea, hypertension, hyperlipidemia, gout, peripheral vascular disease, complete heart block status post dual-chamber pacemaker placement who presents with shortness of breath.  He is on CCPD.  Reportedly he was doing his dialysis when he noted his cycler turned off.  He called the IT help line and his dialysis unit.  He was instructed to reboot his cycler and he attempted to set himself back up for dialysis albeit he became short of breath thus he came in.  Chest plain film noted hazy opacifications concerning for edema versus pneumonitis.  Blood pressures were controlled.  His BNP was 2327.  High-sensitivity troponin elevation was noted with a flat trend.  He reports a dry cough but was without chest pain or pressure.  He denies nausea, emesis, fever, chills, abdominal discomfort, cloudy PD fluid or changes around his access site.  Nephrology is consulted for renal care.     Past Medical History:   Diagnosis Date    Abnormal findings on diagnostic imaging of other specified body structures 05/27/2021    Abnormal CXR    Abnormal weight gain 05/01/2017    Abnormal weight gain    Acute kidney failure, unspecified (CMS/[facility]) 10/24/2016    Acute kidney injury    Chronic kidney disease, stage 3 unspecified (CMS/[facility]) 03/08/2019    Chronic kidney disease (CKD), stage III (moderate)    Cyst of kidney, acquired 08/07/2015    Renal cyst    Disorder of kidney and ureter, unspecified 07/07/2020    Acute on chronic renal insufficiency    Encounter for immunization 09/25/2018    Encounter for immunization    Essential (primary) hypertension 07/09/2015    Benign essential hypertension    Hyperglycemia, unspecified 06/02/2017    Borderline hyperglycemia    Hypersomnia, unspecified 02/17/2017    Excessive  sleepiness    Hypertensive chronic kidney disease with stage 1 through stage 4 chronic kidney disease, or unspecified chronic kidney disease 02/22/2022    CKD stage 4 secondary to hypertension    Hypertensive chronic kidney disease with stage 1 through stage 4 chronic kidney disease, or unspecified chronic kidney disease 02/22/2022    CKD stage 4 secondary to hypertension    Hypertensive chronic kidney disease with stage 1 through stage 4 chronic kidney disease, or unspecified chronic kidney disease 02/22/2022    CKD stage 4 secondary to hypertension    Hypertensive chronic kidney disease with stage 1 through stage 4 chronic kidney disease, or unspecified chronic kidney disease 02/22/2022    CKD stage 4 secondary to hypertension    Hypertensive heart disease without heart failure 08/07/2015    LVH (left ventricular hypertrophy) due to hypertensive disease    Hypoxemia 10/17/2022    Hypoxia    Olecranon bursitis, right elbow 03/22/2016    Olecranon bursitis of right elbow    Other chest pain 06/27/2016    Chest wall pain    Other forms of dyspnea 10/17/2022    Dyspnea on exertion    Pain in leg, unspecified 09/26/2017    Lower extremity pain    Pain in right leg 10/02/2019    Bilateral leg and foot pain    Pain in unspecified elbow 02/01/2016    Elbow pain    Personal history of diseases of the skin and subcutaneous tissue 08/04/2020    History of contact dermatitis    Personal history of diseases of the skin and subcutaneous tissue 02/14/2014    History of dermatitis    Personal history of other diseases of the circulatory system 01/21/2015    History of cardiac murmur    Personal history of other diseases of the circulatory system 08/31/2020    History of hypertension    Personal history of other diseases of the circulatory system 09/27/2021    History of complete atrioventricular block    Personal history of other diseases of the nervous system and sense organs 06/30/2021    History of acute conjunctivitis     Personal history of other diseases of the respiratory system 11/30/2022    History of pulmonary edema    Personal history of other endocrine, nutritional and metabolic disease 10/04/2019    History of obesity    Personal history of other endocrine, nutritional and metabolic disease 10/25/2016    History of hyperkalemia    Personal history of other infectious and parasitic diseases 05/23/2017    History of viral infection    Personal history of other specified conditions 02/17/2017    History of snoring    Personal history of other specified conditions 04/16/2020    History of dysuria    Personal history of other specified conditions 09/27/2021    History of syncope    Personal history of other specified conditions 09/26/2017    History of itching    Personal history of other specified conditions 05/11/2021    History of bradycardia    Personal history of pneumonia (recurrent) 11/13/2022    History of pneumonia    Prediabetes 02/22/2022    Prediabetes    Shortness of breath 10/17/2022    Shortness of breath at rest    Tinea unguium 12/16/2019    Mycotic toenails       Past Surgical History:   Procedure Laterality Date    CATARACT EXTRACTION  09/25/2018    Cataract Surgery    OTHER SURGICAL HISTORY  09/27/2021    Pacemaker insertion       Social History     Tobacco Use    Smoking status: Former     Types: Cigarettes    Smokeless tobacco: Never   Substance Use Topics    Alcohol use: Yes     Alcohol/week: 1.0 standard drink of alcohol     Types: 1 Cans of beer per week    Drug use: Never        Family History  Family History   Problem Relation Name Age of Onset    Dementia Mother      Kidney failure Father          end stage        Allergies  Patient has no known allergies.    Review of Systems   10 point review of systems completed and negative unless stated in HPI  Physical Exam   Constitutional: AAO, NAD   Eyes: PERRL, EOMI, clear sclera   Head/Neck: Neck supple, no JVD, trachea midline   Respiratory/Thorax: Patent  airways, CTAB, normal  breath sounds with good chest expansion, thorax symmetric   Cardiovascular: Regular, rate and rhythm, no murmurs,  normal S 1and S 2   Gastrointestinal: Soft, non-tender, no rebound tenderness  or guarding, +BS,   PC catheter site covered, distended   Extremities: No LE edema   Neurological: Moves 4 ext   Psychological: Appropriate mood and behavior   Skin: Warm and dry, no lesions, no rashes          I&O 24HR  No intake or output data in the 24 hours ending 11/09/23 1514    Vitals 24HR  Heart Rate:  [71-88]   Temp:  [35.9 °C (96.7 °F)-36.6 °C (97.8 °F)]   Resp:  [18]   BP: (119-169)/(47-65)   SpO2:  [92 %-99 %]     Scheduled Medications  allopurinol, 100 mg, oral, Daily  amLODIPine, 10 mg, oral, Daily  aspirin, 81 mg, oral, Daily  atorvastatin, 40 mg, oral, Nightly  calcium polycarbophiL, 625 mg, oral, Daily  dextrose 2.5 % - LOW calcium 2.5 mEq/L 2,000 mL peritoneal dialysate, , intraperitoneal, TID  dextrose 4.25 % - LOW calcium 2.5 mEq/L peritoneal dialysate, , intraperitoneal, BID  ergocalciferol, 1,250 mcg, oral, Weekly  labetalol, 2 tablet, oral, BID  melatonin, 3 mg, oral, Daily  metoprolol, 5 mg, intravenous, Once  metoprolol tartrate, 100 mg, oral, Once  multivitamin with minerals, 1 tablet, oral, Daily  nitroglycerin, 0.8 mg, sublingual, Once  pantoprazole, 40 mg, intravenous, Daily before breakfast  polyethylene glycol, 17 g, oral, Daily  sevelamer carbonate, 800 mg, oral, 4x daily      Continuous medications  heparin, 0-4,000 Units/hr, Last Rate: 11 Units/hr (11/09/23 1300)        PRN medications: acetaminophen **OR** acetaminophen **OR** acetaminophen, albuterol, docusate sodium, guaiFENesin, heparin, LORazepam, metoprolol, metoprolol, metoprolol, metoprolol, metoprolol tartrate, ondansetron **OR** ondansetron     Relevant Results  Results from last 7 days   Lab Units 11/09/23  0619 11/08/23  1232   WBC AUTO x10*3/uL 5.6 7.8   HEMOGLOBIN g/dL 8.3* 9.5*   HEMATOCRIT % 26.9* 30.5*    PLATELETS AUTO x10*3/uL 200 240   NEUTROS PCT AUTO %  --  78.0   LYMPHS PCT AUTO %  --  12.7   MONOS PCT AUTO %  --  6.5   EOS PCT AUTO %  --  1.9      Results from last 7 days   Lab Units 11/09/23  0619 11/08/23  1232   SODIUM mmol/L 141 141   POTASSIUM mmol/L 4.1 4.8   CHLORIDE mmol/L 100 100   CO2 mmol/L 26 31   BUN mg/dL 72* 66*   CREATININE mg/dL 13.02* 12.14*   CALCIUM mg/dL 7.5* 7.8*   PROTEIN TOTAL g/dL  --  6.2*   BILIRUBIN TOTAL mg/dL  --  0.3   ALK PHOS U/L  --  70   ALT U/L  --  11   AST U/L  --  22   GLUCOSE mg/dL 95 91      Cardiac Device Check - Remote         XR chest 2 views   Final Result   Hazy opacification of the lungs perhaps related to edema or   pneumonitis.             Signed by: Vinny Mckeon 11/8/2023 1:17 PM   Dictation workstation:   [id number]      Cardiac device check - Inpatient    (Results Pending)   CT angio coronary art with heartflow if score >30%    (Results Pending)         Assessment/Plan   Pedro Arzate is a 73 y.o. male  with a past medical history of end-stage renal disease on peritoneal dialysis under the care of Dr. Mccurdy, obstructive sleep  apnea, hypertension, hyperlipidemia, gout, peripheral vascular disease, complete heart block status post dual-chamber pacemaker placement who presents with shortness of breath.  He is on CCPD.  Reportedly he was doing his dialysis when he noted his cycler turned off.  He called the IT help line and his dialysis unit.  He was instructed to reboot his cycler and he attempted to set himself back up for dialysis albeit he became short of breath thus he came in.  Chest plain film noted hazy opacifications concerning for edema versus pneumonitis.  Blood pressures were controlled.  His BNP was 2327.  High-sensitivity troponin elevation was noted with a flat trend.  Nephrology is consulted for renal care.    I have ordered for daily exchanges utilizing two 2.5% dextrose dwells as well as two 4.25% dextrose exchanges with a fill volume of 2 L  x 4 hours apiece.  He will have a long overnight dwell using 2.5% dextrose.  Cardiology will see him.  I will order erythropoietin and we will check a phosphorus level.  Nephrology will follow closely with his care.    Principal Problem:    CHF (congestive heart failure), NYHA class I, acute on chronic, combined (CMS/[facility])      I spent 45 minutes in the professional and overall care of this patient.      CHRISTOPHER Kulkarninpatient consult to Nephrology  Consult performed by: Panchito Koehler DO  Consult ordered by: Bryce Dewitt MD

## 2023-11-10 NOTE — PROGRESS NOTES
Pedro Arzate is a 73 y.o. male     Patient has been declining his peritoneal dialysis per nephrology  Is not happy with the peritoneal dialysis causing his abdomen to swell up  There is a clear disconnect here and nephrology needs to address this with the patient    Review of Systems     Constitutional: no fever, no chills, not feeling poorly, not feeling tired   Cardiovascular: no chest pain   Respiratory: no cough, wheezing or shortness of breath a  Gastrointestinal: no abdominal pain, no constipation, no melena, no nausea, no diarrhea, no vomiting and no blood in stools.   Neurological: no headache,   All other systems have been reviewed and are negative for complaint.       Vitals:    11/10/23 1650   BP: 105/56   Pulse:    Resp:    Temp:    SpO2:         Scheduled medications  allopurinol, 100 mg, oral, Daily  amLODIPine, 10 mg, oral, Daily  aspirin, 81 mg, oral, Daily  atorvastatin, 40 mg, oral, Nightly  calcium polycarbophiL, 625 mg, oral, Daily  dextrose 2.5 % - LOW calcium 2.5 mEq/L 2,000 mL peritoneal dialysate, , intraperitoneal, TID  dextrose 4.25 % - LOW calcium 2.5 mEq/L peritoneal dialysate, , intraperitoneal, BID  epoetin donna or biosimilar, 150 Units/kg, subcutaneous, Weekly  ergocalciferol, 1,250 mcg, oral, Weekly  labetalol, 2 tablet, oral, BID  melatonin, 3 mg, oral, Daily  metoprolol, 5 mg, intravenous, Once  multivitamin with minerals, 1 tablet, oral, Daily  pantoprazole, 40 mg, intravenous, Daily before breakfast  polyethylene glycol, 17 g, oral, Daily  sevelamer carbonate, 800 mg, oral, 4x daily      Continuous medications  heparin, 0-4,000 Units/hr, Last Rate: 11 Units/hr (11/10/23 1706)      PRN medications  PRN medications: acetaminophen **OR** acetaminophen **OR** acetaminophen, albuterol, bisacodyl, docusate sodium, guaiFENesin, heparin, LORazepam, metoprolol, metoprolol, metoprolol, metoprolol, metoprolol tartrate, ondansetron **OR** ondansetron    Lab Review   Results from last 7 days    Lab Units 11/10/23  0719 11/10/23  0007 11/09/23  0619   WBC AUTO x10*3/uL 5.7 6.6 5.6   HEMOGLOBIN g/dL 8.8* 8.8* 8.3*   HEMATOCRIT % 28.1* 27.9* 26.9*   PLATELETS AUTO x10*3/uL 213 209 200       Results from last 7 days   Lab Units 11/10/23  0719 11/09/23  0619 11/08/23  1232   SODIUM mmol/L 141 141 141   POTASSIUM mmol/L 4.1 4.1 4.8   CHLORIDE mmol/L 98 100 100   CO2 mmol/L 26 26 31   BUN mg/dL 75* 72* 66*   CREATININE mg/dL 13.43* 13.02* 12.14*   CALCIUM mg/dL 7.7* 7.5* 7.8*   PROTEIN TOTAL g/dL  --   --  6.2*   BILIRUBIN TOTAL mg/dL  --   --  0.3   ALK PHOS U/L  --   --  70   ALT U/L  --   --  11   AST U/L  --   --  22   GLUCOSE mg/dL 109* 95 91       Results from last 7 days   Lab Units 11/10/23  0719 11/09/23  1913 11/08/23  1501   TROPHS ng/L 444* 449* 584*          Cardiac device check - Inpatient         Cardiac Device Check - Remote         XR chest 2 views   Final Result   Hazy opacification of the lungs perhaps related to edema or   pneumonitis.             Signed by: Vinny Mckeon 11/8/2023 1:17 PM   Dictation workstation:   [id number]      CT angio coronary art with heartflow if score >30%    (Results Pending)         Physical Exam     Constitutional   General appearance: Alert and in no acute distress.     Pulmonary   Respiratory assessment: No respiratory distress, normal respiratory rhythm and effort.    Auscultation of Lungs: Clear bilateral breath sounds.   Cardiovascular   Auscultation of heart: Apical pulse normal, heart rate and rhythm normal, normal S1 and S2, no murmurs and no pericardial rub.    Exam for edema: No peripheral edema.   Abdomen   Abdominal Exam: Distended abdomen nontender  Liver and Spleen exam: No hepato-splenomegaly.   Musculoskeletal   Examination of gait: ROM intact  Skin inspection: Normal skin color and pigmentation, normal skin turgor and no visible rash.   Neurologic   Cranial nerves: Nerves 2-12 were intact, no focal neuro defects.     Assessment/Plan      #Fluid  overload  #End-stage renal disease on peritoneal dialysis  Responded well to Lasix  .  Dialysis as per nephrology    #Hypertension  Stable  Continue home medications    #Dyslipidemia  Stable  Continue home medications    #Demand ischemia  CT angio done  Results are pending

## 2023-11-10 NOTE — PROGRESS NOTES
"Subjective Data:  Patient resting, continues to c/o shah, no c/o CP.  Awaiting Coronary CTA today    Reports discomfort with his PD treatment>different from Home PD and this process causes abd fullness and dyspnea> improve post treatment.    Overnight Events:    None Reported     Objective Data:  Last Recorded Vitals:  Vitals:    234 11/10/23 0031 11/10/23 0329 11/10/23 0826   BP: 157/58 133/56 143/58 166/60   BP Location:  Right arm Right arm Right arm   Patient Position: Sitting Lying     Pulse: 80 77 78 83   Resp:  18 18 18   Temp: 36 °C (96.8 °F) 36.4 °C (97.5 °F) 36.2 °C (97.2 °F)    TempSrc: Temporal Oral Oral    SpO2:  94% 97% 95%   Weight:       Height:         Medical Gas Therapy: Supplemental oxygen  O2 Delivery Method: Nasal cannula (2 L)  Weight  Av.6 kg (160 lb)  Min: 72.6 kg (160 lb)  Max: 72.6 kg (160 lb)    LABS:  CMP:  Results from last 7 days   Lab Units 11/10/23  0719 11/09/23  0619 11/08/23  1232   SODIUM mmol/L 141 141 141   POTASSIUM mmol/L 4.1 4.1 4.8   CHLORIDE mmol/L 98 100 100   CO2 mmol/L 26 26 31   ANION GAP mmol/L 21* 19 15   BUN mg/dL 75* 72* 66*   CREATININE mg/dL 13.43* 13.02* 12.14*   EGFR mL/min/1.73m*2 3* 4* 4*   ALBUMIN g/dL 3.0*  --  3.1*   ALT U/L  --   --  11   AST U/L  --   --  22   BILIRUBIN TOTAL mg/dL  --   --  0.3     CBC:  Results from last 7 days   Lab Units 11/10/23  0719 11/10/23  0007 23  1232   WBC AUTO x10*3/uL 5.7 6.6 5.6 7.8   HEMOGLOBIN g/dL 8.8* 8.8* 8.3* 9.5*   HEMATOCRIT % 28.1* 27.9* 26.9* 30.5*   PLATELETS AUTO x10*3/uL 213 209 200 240   MCV fL 91 92 93 92     COAG:     ABO: No results found for: \"ABO\"  HEME/ENDO:     CARDIAC:   Results from last 7 days   Lab Units 11/10/23  0719 23  1913 23  1501 23  1356 23  1232   TROPHS ng/L 444* 449* 584* 472* 312*   BNP pg/mL  --   --   --   --  2,327*             Last I/O:    Intake/Output Summary (Last 24 hours) at 11/10/2023 1021  Last data filed at " 11/10/2023 0500  Gross per 24 hour   Intake 290.1 ml   Output --   Net 290.1 ml     Net IO Since Admission: 290.1 mL [11/10/23 1021]      Imaging Results:  XR chest 2 views    Result Date: 11/8/2023  Interpreted By:  Vinny Mckeon, STUDY: XR CHEST 2 VIEWS;  11/8/2023 12:57 pm   INDICATION: Signs/Symptoms:SOB.   COMPARISON: 04/02/2023   ACCESSION NUMBER(S): [id number]   ORDERING CLINICIAN: ROSA CANNON   FINDINGS: Pacemaker. Interstitial prominence with hazy opacification of the lungs. No apparent pleural effusion. Cardiomegaly. Aortic atherosclerosis.       Hazy opacification of the lungs perhaps related to edema or pneumonitis.     Signed by: Vinny Mckeon 11/8/2023 1:17 PM Dictation workstation:   [id number]            Inpatient Medications:  Scheduled medications   Medication Dose Route Frequency    allopurinol  100 mg oral Daily    amLODIPine  10 mg oral Daily    aspirin  81 mg oral Daily    atorvastatin  40 mg oral Nightly    calcium polycarbophiL  625 mg oral Daily    dextrose 2.5 % - LOW calcium 2.5 mEq/L 2,000 mL peritoneal dialysate   intraperitoneal TID    dextrose 4.25 % - LOW calcium 2.5 mEq/L peritoneal dialysate   intraperitoneal BID    epoetin donna or biosimilar  150 Units/kg subcutaneous Weekly    ergocalciferol  1,250 mcg oral Weekly    labetalol  2 tablet oral BID    melatonin  3 mg oral Daily    metoprolol  5 mg intravenous Once    multivitamin with minerals  1 tablet oral Daily    nitroglycerin  0.8 mg sublingual Once    pantoprazole  40 mg intravenous Daily before breakfast    polyethylene glycol  17 g oral Daily    sevelamer carbonate  800 mg oral 4x daily     PRN medications   Medication    acetaminophen    Or    acetaminophen    Or    acetaminophen    albuterol    bisacodyl    docusate sodium    guaiFENesin    heparin    LORazepam    metoprolol    metoprolol    metoprolol    metoprolol    metoprolol tartrate    ondansetron    Or    ondansetron     Continuous Medications   Medication Dose  Last Rate    heparin  0-4,000 Units/hr 1,300 Units/hr (11/10/23 0615)     Physical Exam:  General:  Patient is awake, alert, and oriented.  Patient is in no acute distress.  Neck:  No thyromegaly.  Normal Jugular Venous Pressure.  Cardiovascular:  paced Rhythm.  Normal S1 and S2. + cardiac murmur  Pulmonary:  Clear to auscultation bilaterally.  Abdomen: Distended, + bowel sounds, + peritoneal dialysis cath in place  Lower Extremities:  2+ pedal pulses. No LE edema.  Neurologic:  Cranial nerves intact.  No focal deficit.   Skin: Skin warm and dry, normal skin turgor.   Psychiatric: Normal affect.        Assessment/Plan   Pedro Arzate is a 73 y.o. male with a past medical history of  Diastolic HF LVEF 55-60% (last TTE 6/7/22), end-stage renal disease on peritoneal dialysis (follows with Dr. Mccurdy), obstructive sleep apnea,  hypertension, hyperlipidemia, gout, hyperparathyroidism, anemia, peripheral vascular disease, complete heart block status post dual-chamber pacemaker placement, LBBB, who presents with c/o Sob/ cough. Initial EKG reveals paced rhythm, underlying SR, chest x-ray hazy opacification consistent with edema or pneumonitis, troponins 312, 584 (previous HS [id number]).  Cardiology consulted for further evaluation of NSTEMI.     I reviewed EKG, paced rhythm with underlying sinus rhythm  I reviewed Telemetry, no significant events, a lot of artifact  I reviewed all labs and imaging reports     #Concern for UA; probable type II MI in sett of ESRD, Probable Volume Overload d/t Home dialysis equip malfunction. Denies any CP and no new ischemic changes on EKG.   NPO for Coronary CTA to further eval for ischemia     # Acute on chronic Diastolic HF in setting of incomplete volume removal from Peritoneal dialysis d/t malfunctioning equip.   Patient reports improvement in breathing s/p IV Lasix 80 mg IV x1   Volume management per Nephrology/ PD     # CHB s/p PPM. Device stable by available matrix     #LBBB  (noted on EKG 2022)  Stress Test 8/22> nonischemic     #HTN. Acceptable      #HLD. Continue statin      Rec's:  NPO for Coronary CTA today  Trop downtrending  Volume management per Nephrology/ PD  Continue outpt CV meds  C/w Heparin Infusion, ASA, Statin, BB  Patient will benefit from Cardiology follow up with Dr. Quiles post discharge> will request appoint.       Code Status:  Full Code      Thank you for allowing me to participate in their care.  Please feel free to call me with any further questions or concerns.    Darvin Valenzuela MD, FAC, [facility]  Division of Cardiovascular Medicine  Medical Director, [facility]  [facility]  Assistant Clinical Professor, Medicine  [employer]  [email or web address]  Office:  [phone]     I personally reviewed the patient's vital signs, telemetry, recent labs, medications, orders, EKGs, and pertinent cardiac imaging/ echocardiography.  I have reviewed the TANJA's encounter note, approve the TANJA's documentation and have edited the note to reflect my diagnostic and therapeutic plan.

## 2023-11-10 NOTE — CARE PLAN
Problem: Safety  Goal: Patient will be injury free during hospitalization  Outcome: Progressing  Goal: I will remain free of falls  Outcome: Progressing     Problem: Daily Care  Goal: Daily care needs are met  Outcome: Progressing     Problem: Psychosocial Needs  Goal: Demonstrates ability to cope with hospitalization/illness  Outcome: Progressing  Goal: Collaborate with me, my family, and caregiver to identify my specific goals  Outcome: Progressing     Problem: Discharge Barriers  Goal: My discharge needs are met  Outcome: Progressing

## 2023-11-10 NOTE — PROCEDURES
Patient receiving peritoneal dialysis.  Unfortunately Mr. Arzate is intermittently been declining to do his PD.  States he feels more short of breath due to abdominal distention related to his peritoneal dialysis fluid.  He is resting comfortably on room air.  No limb edema.  No crackles auscultated.  He is planned for coronary CTA today.  Continue his current PD regimen of two 4.25% dextrose infusions and two 2.5% dextrose exchanges and one overnight 2.5% long dwell each with a fill volume of 2 L x 4 hours apiece.  Erythropoietin, phosphorus binder, renal multivitamin and vitamin D are ordered.  Continue PD per submitted orders.

## 2023-11-11 NOTE — CARE PLAN
"  Problem: Pain  Goal: My pain/discomfort is manageable  Outcome: Progressing  Flowsheets (Taken 11/11/2023 0515)  Resident's pain/discomfort is manageable:   Include resident/family/caregiver in decisions related to pain management   Offer non-pharmacological pain management interventions  Note: .     Problem: Safety  Goal: Patient will be injury free during hospitalization  Outcome: Progressing  Note: .  Goal: I will remain free of falls  Outcome: Progressing  Flowsheets (Taken 11/11/2023 0515)  Resident will remain free of falls:   Apply bed/chair alarms as appropriate   Assist with toileting as orderd   Assess and monitor medications that may increase fall risk   Visual checks per facility policy  Note: .     Problem: Daily Care  Goal: Daily care needs are met  Outcome: Progressing  Flowsheets (Taken 11/11/2023 0515)  Daily care needs are met:   Assess and monitor ability to perform self care and identify potential discharge needs   Encourage independent activity per ability  Note: .     Problem: Psychosocial Needs  Goal: Demonstrates ability to cope with hospitalization/illness  Outcome: Progressing  Flowsheets (Taken 11/11/2023 0515)  Demonstrates ability to cope with hospitalization/illness:   Encourage verbalization of feelings/concerns/expectations   Reinforce positive adaptation of new coping behaviors  Note: .  Goal: Collaborate with me, my family, and caregiver to identify my specific goals  Outcome: Progressing  Flowsheets  Taken 11/11/2023 0515 by Alyse Matta RN  Is there anything else we need to know to provide the best care possible?: \"no, not that i can think of\"  Taken 11/8/2023 1650 by Krystal Fatima RN  Cultural Requests During Hospitalization: n/a  Spiritual Requests During Hospitalization: n/a  Note: .     Problem: Discharge Barriers  Goal: My discharge needs are met  Outcome: Progressing  Flowsheets (Taken 11/11/2023 0515)  Resident's discharge needs are met: Identify potential " discharge barriers on admission and throughout stay  Note: .     Problem: Skin  Goal: Participates in plan/prevention/treatment measures  Outcome: Progressing  Flowsheets (Taken 11/11/2023 0518)  Participates in plan/prevention/treatment measures:   Increase activity/out of bed for meals   Elevate heels  Note: .  Goal: Promote/optimize nutrition  Outcome: Progressing  Flowsheets (Taken 11/11/2023 0518)  Promote/optimize nutrition:   Assist with feeding   Offer water/supplements/favorite foods   Monitor/record intake including meals  Note: .   The patient's goals for the shift include      The clinical goals for the shift include safety    Over the shift, the patient did not make progress toward the following goals. Barriers to progression include .. Recommendations to address these barriers include ..

## 2023-11-11 NOTE — PROGRESS NOTES
"Subjective Data:    Overnight Events:    None Reported     Objective Data:  Last Recorded Vitals:  Vitals:    11/10/23 2021 11/11/23 0038 23 0424 23   BP: 138/52 (!) 137/43 147/58 134/57   BP Location: Right arm Right arm Right arm    Patient Position: Lying Lying Lying    Pulse: 69 68 73 73   Resp: 18 18 18 18   Temp: 36.5 °C (97.7 °F) 36.4 °C (97.5 °F) 36.5 °C (97.7 °F) 36.5 °C (97.7 °F)   TempSrc: Temporal Temporal Temporal    SpO2: 93% 92% 92% 94%   Weight:       Height:         Medical Gas Therapy: None (Room air)  O2 Delivery Method: Nasal cannula (2 L)  Weight  Av.6 kg (160 lb)  Min: 72.6 kg (160 lb)  Max: 72.6 kg (160 lb)    LABS:  CMP:  Results from last 7 days   Lab Units 2356 11/10/23  0719 11/09/23  0619 11/08/23  1232   SODIUM mmol/L 140 141 141 141   POTASSIUM mmol/L 4.0 4.1 4.1 4.8   CHLORIDE mmol/L 98 98 100 100   CO2 mmol/L 26 26 26 31   ANION GAP mmol/L 20 21* 19 15   BUN mg/dL 65* 75* 72* 66*   CREATININE mg/dL 12.26* 13.43* 13.02* 12.14*   EGFR mL/min/1.73m*2 4* 3* 4* 4*   ALBUMIN g/dL  --  3.0*  --  3.1*   ALT U/L  --   --   --  11   AST U/L  --   --   --  22   BILIRUBIN TOTAL mg/dL  --   --   --  0.3       CBC:  Results from last 7 days   Lab Units 23  0556 11/10/23  0719 11/10/23  0007 23  1232   WBC AUTO x10*3/uL 6.2 5.7 6.6 5.6 7.8   HEMOGLOBIN g/dL 8.2* 8.8* 8.8* 8.3* 9.5*   HEMATOCRIT % 26.6* 28.1* 27.9* 26.9* 30.5*   PLATELETS AUTO x10*3/uL 206 213 209 200 240   MCV fL 93 91 92 93 92       COAG:     ABO: No results found for: \"ABO\"  HEME/ENDO:     CARDIAC:   Results from last 7 days   Lab Units 11/10/23  0719 23  1913 23  1501 23  1356 23  1232   TROPHS ng/L 444* 449* 584* 472* 312*   BNP pg/mL  --   --   --   --  2,327*               Last I/O:    Intake/Output Summary (Last 24 hours) at 2023 1117  Last data filed at 2023 0200  Gross per 24 hour   Intake --   Output 3800 ml   Net -3800 ml "       Net IO Since Admission: -3,509.9 mL [11/11/23 1117]      Imaging Results:  XR chest 2 views    Result Date: 11/8/2023  Interpreted By:  Vinny Mckeon, STUDY: XR CHEST 2 VIEWS;  11/8/2023 12:57 pm   INDICATION: Signs/Symptoms:SOB.   COMPARISON: 04/02/2023   ACCESSION NUMBER(S): [id number]   ORDERING CLINICIAN: ROSA CANNON   FINDINGS: Pacemaker. Interstitial prominence with hazy opacification of the lungs. No apparent pleural effusion. Cardiomegaly. Aortic atherosclerosis.       Hazy opacification of the lungs perhaps related to edema or pneumonitis.     Signed by: Vinny Mckeon 11/8/2023 1:17 PM Dictation workstation:   [id number]            Inpatient Medications:  Scheduled medications   Medication Dose Route Frequency    allopurinol  100 mg oral Daily    amLODIPine  10 mg oral Daily    aspirin  81 mg oral Daily    atorvastatin  40 mg oral Nightly    calcium polycarbophiL  625 mg oral Daily    dextrose 2.5 % - LOW calcium 2.5 mEq/L 2,000 mL peritoneal dialysate   intraperitoneal TID    dextrose 4.25 % - LOW calcium 2.5 mEq/L peritoneal dialysate   intraperitoneal BID    epoetin donna or biosimilar  150 Units/kg subcutaneous Weekly    ergocalciferol  1,250 mcg oral Weekly    labetalol  2 tablet oral BID    melatonin  3 mg oral Daily    metoprolol  5 mg intravenous Once    multivitamin with minerals  1 tablet oral Daily    pantoprazole  40 mg intravenous Daily before breakfast    polyethylene glycol  17 g oral Daily    sevelamer carbonate  800 mg oral 4x daily     PRN medications   Medication    acetaminophen    Or    acetaminophen    Or    acetaminophen    albuterol    bisacodyl    docusate sodium    guaiFENesin    heparin    LORazepam    metoprolol    metoprolol    metoprolol    metoprolol    metoprolol tartrate    ondansetron    Or    ondansetron     Continuous Medications   Medication Dose Last Rate    heparin  0-4,000 Units/hr 11 Units/hr (11/11/23 0911)     Physical Exam:  General:  Patient is awake,  alert, and oriented.  Patient is in no acute distress.  Neck:  No thyromegaly.  Normal Jugular Venous Pressure.  Cardiovascular:  paced Rhythm.  Normal S1 and S2. + cardiac murmur  Pulmonary:  Clear to auscultation bilaterally.  Abdomen: Distended, + bowel sounds, + peritoneal dialysis cath in place  Lower Extremities:  2+ pedal pulses. No LE edema.  Neurologic:  Cranial nerves intact.  No focal deficit.   Skin: Skin warm and dry, normal skin turgor.   Psychiatric: Normal affect.        Assessment/Plan   Pedro Arzate is a 73 y.o. male with a past medical history of  Diastolic HF LVEF 55-60% (last TTE 6/7/22), end-stage renal disease on peritoneal dialysis (follows with Dr. Mccurdy), obstructive sleep apnea,  hypertension, hyperlipidemia, gout, hyperparathyroidism, anemia, peripheral vascular disease, complete heart block status post dual-chamber pacemaker placement, LBBB, who presents with c/o Sob/ cough. Initial EKG reveals paced rhythm, underlying SR, chest x-ray hazy opacification consistent with edema or pneumonitis, troponins 312, 584 (previous HS [id number]).  Cardiology consulted for further evaluation of NSTEMI.     I reviewed EKG, paced rhythm with underlying sinus rhythm  I reviewed Telemetry, no significant events, a lot of artifact  I reviewed all labs and imaging reports     #Concern for UA; probable type II MI in sett of ESRD, Probable Volume Overload d/t Home dialysis equip malfunction. Denies any CP and no new ischemic changes on EKG.   Coronary CTA to further eval for ischemia-> Limited study due to motion artifact.  2. Within the limitations scattered calcified plaques are identified  in proximal and mid LAD causing less than 50% stenosis.  3. No significant atherosclerotic calcification or stenosis in left  main and CX.  4. Unable to assess RCA due to motion artifact and beam hardening  artifact caused by pacer/defibrillator wires.  5. Left atrial and left ventricular enlargement.  6.  Calcification of the aortic valve. Correlation with aortic valve  stenosis.  7. Moderate sized bilateral pleural effusion with associated  atelectasis. Mild interstitial pulmonary edema. An area of  ground-glass airspace opacity in the right upper lobe which might be  due to edema or developing infiltrate. Correlate clinically and with  concern for infection.  8. Moderate amount of free fluid in the upper abdomen with small  amount of free air which may be due to peritoneal dialysis. Correlate  clinically and correlate with concern for acute abdominal pathology.      # Acute on chronic Diastolic HF in setting of incomplete volume removal from Peritoneal dialysis d/t malfunctioning equip.   Patient reports improvement in breathing s/p IV Lasix 80 mg IV x1   Volume management per Nephrology/ PD     # CHB s/p PPM. Device stable by available matrix     #LBBB (noted on EKG 2022)  Stress Test 8/22> nonischemic     #HTN. Acceptable      #HLD. Continue statin      Rec's:  -Cor CTA mild to moderate nonobstructive coronary artery disease, but there was suboptimal visualization of the RCA.   Volume management per Nephrology/ PD  Continue outpt CV meds  C/w Heparin Infusion, ASA, Statin, BB  Patient will benefit from Cardiology follow up with Dr. Quiles post discharge> will request appoint.  4-8 weeks        Code Status:  Full Code

## 2023-11-11 NOTE — PROGRESS NOTES
Pedro Arzate is a 73 y.o. male     CT angio okay  Patient feels fine    Review of Systems     Constitutional: no fever, no chills, not feeling poorly, not feeling tired   Cardiovascular: no chest pain   Respiratory: no cough, wheezing or shortness of breath a  Gastrointestinal: no abdominal pain, no constipation, no melena, no nausea, no diarrhea, no vomiting and no blood in stools.   Neurological: no headache,   All other systems have been reviewed and are negative for complaint.       Vitals:    11/11/23 1226   BP: 138/58   Pulse: 72   Resp: 18   Temp: 36.4 °C (97.6 °F)   SpO2: 93%        Scheduled medications  allopurinol, 100 mg, oral, Daily  amLODIPine, 10 mg, oral, Daily  aspirin, 81 mg, oral, Daily  atorvastatin, 40 mg, oral, Nightly  calcium polycarbophiL, 625 mg, oral, Daily  dextrose 2.5 % - LOW calcium 2.5 mEq/L 2,000 mL peritoneal dialysate, , intraperitoneal, TID  dextrose 4.25 % - LOW calcium 2.5 mEq/L peritoneal dialysate, , intraperitoneal, BID  epoetin donna or biosimilar, 150 Units/kg, subcutaneous, Weekly  ergocalciferol, 1,250 mcg, oral, Weekly  labetalol, 2 tablet, oral, BID  melatonin, 3 mg, oral, Daily  metoprolol, 5 mg, intravenous, Once  multivitamin with minerals, 1 tablet, oral, Daily  pantoprazole, 40 mg, intravenous, Daily before breakfast  polyethylene glycol, 17 g, oral, Daily  sevelamer carbonate, 800 mg, oral, 4x daily      Continuous medications  heparin, 0-4,000 Units/hr, Last Rate: 11 Units/hr (11/11/23 0911)      PRN medications  PRN medications: acetaminophen **OR** acetaminophen **OR** acetaminophen, albuterol, bisacodyl, docusate sodium, guaiFENesin, heparin, LORazepam, metoprolol, metoprolol, metoprolol, metoprolol, metoprolol tartrate, ondansetron **OR** ondansetron    Lab Review   Results from last 7 days   Lab Units 11/11/23  0556 11/10/23  0719 11/10/23  0007   WBC AUTO x10*3/uL 6.2 5.7 6.6   HEMOGLOBIN g/dL 8.2* 8.8* 8.8*   HEMATOCRIT % 26.6* 28.1* 27.9*   PLATELETS AUTO  x10*3/uL 206 213 209       Results from last 7 days   Lab Units 11/11/23  0556 11/10/23  0719 11/09/23  0619 11/08/23  1232   SODIUM mmol/L 140 141 141 141   POTASSIUM mmol/L 4.0 4.1 4.1 4.8   CHLORIDE mmol/L 98 98 100 100   CO2 mmol/L 26 26 26 31   BUN mg/dL 65* 75* 72* 66*   CREATININE mg/dL 12.26* 13.43* 13.02* 12.14*   CALCIUM mg/dL 7.3* 7.7* 7.5* 7.8*   PROTEIN TOTAL g/dL  --   --   --  6.2*   BILIRUBIN TOTAL mg/dL  --   --   --  0.3   ALK PHOS U/L  --   --   --  70   ALT U/L  --   --   --  11   AST U/L  --   --   --  22   GLUCOSE mg/dL 92 109* 95 91       Results from last 7 days   Lab Units 11/10/23  0719 11/09/23  1913 11/08/23  1501   TROPHS ng/L 444* 449* 584*          CT angio coronary art with heartflow if score >30%   Final Result   1. Limited study due to motion artifact.   2. Within the limitations scattered calcified plaques are identified   in proximal and mid LAD causing less than 50% stenosis.   3. No significant atherosclerotic calcification or stenosis in left   main and CX.   4. Unable to assess RCA due to motion artifact and beam hardening   artifact caused by pacer/defibrillator wires.   5. Left atrial and left ventricular enlargement.   6. Calcification of the aortic valve. Correlation with aortic valve   stenosis.   7. Moderate sized bilateral pleural effusion with associated   atelectasis. Mild interstitial pulmonary edema. An area of   ground-glass airspace opacity in the right upper lobe which might be   due to edema or developing infiltrate. Correlate clinically and with   concern for infection.   8. Moderate amount of free fluid in the upper abdomen with small   amount of free air which may be due to peritoneal dialysis. Correlate   clinically and correlate with concern for acute abdominal pathology.             MACRO:   Findings were relayed to the ordering nurse practitioner Ms. Kamara at   5:32 p.m., 11/10/2023 via [facility] secure chat.        Signed by: Michelle Truong 11/10/2023  5:41 PM   Dictation workstation:   [id number]      Cardiac device check - Inpatient         Cardiac Device Check - Remote         XR chest 2 views   Final Result   Hazy opacification of the lungs perhaps related to edema or   pneumonitis.             Signed by: Vinny Mckeon 11/8/2023 1:17 PM   Dictation workstation:   [id number]            Physical Exam     Constitutional   General appearance: Alert and in no acute distress.     Pulmonary   Respiratory assessment: No respiratory distress, normal respiratory rhythm and effort.    Auscultation of Lungs: Clear bilateral breath sounds.   Cardiovascular   Auscultation of heart: Apical pulse normal, heart rate and rhythm normal, normal S1 and S2, no murmurs and no pericardial rub.    Exam for edema: No peripheral edema.   Abdomen   Abdominal Exam: Distended abdomen nontender  Liver and Spleen exam: No hepato-splenomegaly.   Musculoskeletal   Examination of gait: ROM intact  Skin inspection: Normal skin color and pigmentation, normal skin turgor and no visible rash.   Neurologic   Cranial nerves: Nerves 2-12 were intact, no focal neuro defects.     Assessment/Plan      #Fluid overload  #End-stage renal disease on peritoneal dialysis  Responded well to Lasix  .  Dialysis as per nephrology    #Hypertension  Stable  Continue home medications    #Dyslipidemia  Stable  Continue home medications    #Demand ischemia  CT angio unremarkable although the right coronary was not clearly visible

## 2023-11-12 NOTE — PROGRESS NOTES
"Subjective Data:  No acute events     Overnight Events:    None Reported     Objective Data:  Last Recorded Vitals:  Vitals:    23 2316 23 0338 23 07   BP: (!) 122/47 154/69  144/57   BP Location:       Patient Position:       Pulse: 74 67  72   Resp: 18 18  18   Temp: 36.5 °C (97.7 °F) 36.7 °C (98.1 °F)  35.7 °C (96.2 °F)   TempSrc:       SpO2: 93% 96%  96%   Weight:   91 kg (200 lb 9.9 oz)    Height:         Medical Gas Therapy: None (Room air)  O2 Delivery Method: Nasal cannula  Weight  Av.7 kg (173 lb 8.6 oz)  Min: 72.6 kg (160 lb)  Max: 91 kg (200 lb 9.9 oz)    LABS:  CMP:  Results from last 7 days   Lab Units 2356 11/10/23  0719 11/09/23  0619 11/08/23  1232   SODIUM mmol/L 137 140 141 141 141   POTASSIUM mmol/L 3.6 4.0 4.1 4.1 4.8   CHLORIDE mmol/L 96* 98 98 100 100   CO2 mmol/L 25 26 26 26 31   ANION GAP mmol/L 20 20 21* 19 15   BUN mg/dL 58* 65* 75* 72* 66*   CREATININE mg/dL 11.45* 12.26* 13.43* 13.02* 12.14*   EGFR mL/min/1.73m*2 4* 4* 3* 4* 4*   ALBUMIN g/dL  --   --  3.0*  --  3.1*   ALT U/L  --   --   --   --  11   AST U/L  --   --   --   --  22   BILIRUBIN TOTAL mg/dL  --   --   --   --  0.3       CBC:  Results from last 7 days   Lab Units 23  0556 11/10/23  0719 11/10/23  0007 23  1232   WBC AUTO x10*3/uL 6.2 6.2 5.7 6.6 5.6 7.8   HEMOGLOBIN g/dL 9.1* 8.2* 8.8* 8.8* 8.3* 9.5*   HEMATOCRIT % 29.8* 26.6* 28.1* 27.9* 26.9* 30.5*   PLATELETS AUTO x10*3/uL 197 206 213 209 200 240   MCV fL 95 93 91 92 93 92       COAG:     ABO: No results found for: \"ABO\"  HEME/ENDO:     CARDIAC:   Results from last 7 days   Lab Units 11/10/23  0719 23  1913 23  1501 23  1356 23  1232   TROPHS ng/L 444* 449* 584* 472* 312*   BNP pg/mL  --   --   --   --  2,327*               Last I/O:    Intake/Output Summary (Last 24 hours) at 2023 0958  Last data filed at 2023 0300  Gross per 24 " hour   Intake --   Output 7100 ml   Net -7100 ml       Net IO Since Admission: -10,609.9 mL [11/12/23 0958]      Imaging Results:  XR chest 2 views    Result Date: 11/8/2023  Interpreted By:  Vinny Mckeon, STUDY: XR CHEST 2 VIEWS;  11/8/2023 12:57 pm   INDICATION: Signs/Symptoms:SOB.   COMPARISON: 04/02/2023   ACCESSION NUMBER(S): [id number]   ORDERING CLINICIAN: ROSA CANNON   FINDINGS: Pacemaker. Interstitial prominence with hazy opacification of the lungs. No apparent pleural effusion. Cardiomegaly. Aortic atherosclerosis.       Hazy opacification of the lungs perhaps related to edema or pneumonitis.     Signed by: Vinny Mckeon 11/8/2023 1:17 PM Dictation workstation:   [id number]            Inpatient Medications:  Scheduled medications   Medication Dose Route Frequency    allopurinol  100 mg oral Daily    amLODIPine  10 mg oral Daily    aspirin  81 mg oral Daily    atorvastatin  40 mg oral Nightly    calcium polycarbophiL  625 mg oral Daily    dextrose 2.5 % - LOW calcium 2.5 mEq/L 2,000 mL peritoneal dialysate   intraperitoneal TID    dextrose 4.25 % - LOW calcium 2.5 mEq/L peritoneal dialysate   intraperitoneal BID    epoetin donna or biosimilar  150 Units/kg subcutaneous Weekly    ergocalciferol  1,250 mcg oral Weekly    labetalol  2 tablet oral BID    melatonin  3 mg oral Daily    metoprolol  5 mg intravenous Once    multivitamin with minerals  1 tablet oral Daily    pantoprazole  40 mg intravenous Daily before breakfast    polyethylene glycol  17 g oral Daily    sevelamer carbonate  800 mg oral 4x daily     PRN medications   Medication    acetaminophen    Or    acetaminophen    Or    acetaminophen    albuterol    bisacodyl    docusate sodium    guaiFENesin    heparin    LORazepam    metoprolol    metoprolol    metoprolol    metoprolol    metoprolol tartrate    ondansetron    Or    ondansetron     Continuous Medications   Medication Dose Last Rate    heparin  0-4,000 Units/hr 1,100 Units/hr (11/12/23 0707)      Physical Exam:  General:  Patient is awake, alert, and oriented.  Patient is in no acute distress.  Neck:  No thyromegaly.  Normal Jugular Venous Pressure.  Cardiovascular:  paced Rhythm.  Normal S1 and S2. + cardiac murmur  Pulmonary:  Clear to auscultation bilaterally.  Abdomen: Distended, + bowel sounds, + peritoneal dialysis cath in place  Lower Extremities:  2+ pedal pulses. No LE edema.  Neurologic:  Cranial nerves intact.  No focal deficit.   Skin: Skin warm and dry, normal skin turgor.   Psychiatric: Normal affect.        Assessment/Plan   Pedro Arzate is a 73 y.o. male with a past medical history of  Diastolic HF LVEF 55-60% (last TTE 6/7/22), end-stage renal disease on peritoneal dialysis (follows with Dr. Mccurdy), obstructive sleep apnea,  hypertension, hyperlipidemia, gout, hyperparathyroidism, anemia, peripheral vascular disease, complete heart block status post dual-chamber pacemaker placement, LBBB, who presents with c/o Sob/ cough. Initial EKG reveals paced rhythm, underlying SR, chest x-ray hazy opacification consistent with edema or pneumonitis, troponins 312, 584 (previous HS [id number]).  Cardiology consulted for further evaluation of NSTEMI.     I reviewed EKG, paced rhythm with underlying sinus rhythm  I reviewed Telemetry, no significant events, a lot of artifact  I reviewed all labs and imaging reports     #Concern for UA; probable type II MI in sett of ESRD, Probable Volume Overload d/t Home dialysis equip malfunction. Denies any CP and no new ischemic changes on EKG.   Coronary CTA to further eval for ischemia-> Limited study due to motion artifact.  2. Within the limitations scattered calcified plaques are identified  in proximal and mid LAD causing less than 50% stenosis.  3. No significant atherosclerotic calcification or stenosis in left  main and CX.  4. Unable to assess RCA due to motion artifact and beam hardening  artifact caused by pacer/defibrillator wires.  5. Left atrial  and left ventricular enlargement.  6. Calcification of the aortic valve. Correlation with aortic valve  stenosis.  7. Moderate sized bilateral pleural effusion with associated  atelectasis. Mild interstitial pulmonary edema. An area of  ground-glass airspace opacity in the right upper lobe which might be  due to edema or developing infiltrate. Correlate clinically and with  concern for infection.  8. Moderate amount of free fluid in the upper abdomen with small  amount of free air which may be due to peritoneal dialysis. Correlate  clinically and correlate with concern for acute abdominal pathology.      # Acute on chronic Diastolic HF in setting of incomplete volume removal from Peritoneal dialysis d/t malfunctioning equip.   Patient reports improvement in breathing s/p IV Lasix 80 mg IV x1   Volume management per Nephrology/ PD     # CHB s/p PPM. Device stable by available matrix     #LBBB (noted on EKG 2022)  Stress Test 8/22> nonischemic     #HTN. Acceptable      #HLD. Continue statin      Rec's:  -Cor CTA mild to moderate nonobstructive coronary artery disease, but there was suboptimal visualization of the RCA.   -Volume management per Nephrology/ PD  -Continue outpt CV meds  -C/w Heparin Infusion, ASA, Statin, BB  -Patient will benefit from Cardiology follow up with Dr. Quiles post discharge> will request appoint. 4-8 weeks        Code Status:  Full Code

## 2023-11-12 NOTE — PROGRESS NOTES
Pedro Arzate is a 73 y.o. male     Still complains of getting short of breath after completing ADLs  Keen on getting home soon      Review of Systems     Constitutional: no fever, no chills, not feeling poorly, not feeling tired   Cardiovascular: no chest pain   Respiratory: no cough, wheezing   Gastrointestinal: no abdominal pain, no constipation, no melena, no nausea, no diarrhea, no vomiting and no blood in stools.   Neurological: no headache,   All other systems have been reviewed and are negative for complaint.       Vitals:    11/12/23 0743   BP: 144/57   Pulse: 72   Resp: 18   Temp: 35.7 °C (96.2 °F)   SpO2: 96%        Scheduled medications  allopurinol, 100 mg, oral, Daily  amLODIPine, 10 mg, oral, Daily  aspirin, 81 mg, oral, Daily  atorvastatin, 40 mg, oral, Nightly  calcium polycarbophiL, 625 mg, oral, Daily  dextrose 2.5 % - LOW calcium 2.5 mEq/L 2,000 mL peritoneal dialysate, , intraperitoneal, TID  dextrose 4.25 % - LOW calcium 2.5 mEq/L peritoneal dialysate, , intraperitoneal, BID  epoetin donna or biosimilar, 150 Units/kg, subcutaneous, Weekly  ergocalciferol, 1,250 mcg, oral, Weekly  labetalol, 2 tablet, oral, BID  melatonin, 3 mg, oral, Daily  metoprolol, 5 mg, intravenous, Once  multivitamin with minerals, 1 tablet, oral, Daily  pantoprazole, 40 mg, intravenous, Daily before breakfast  polyethylene glycol, 17 g, oral, Daily  sevelamer carbonate, 800 mg, oral, 4x daily      Continuous medications  heparin, 0-4,000 Units/hr, Last Rate: 1,100 Units/hr (11/12/23 0707)      PRN medications  PRN medications: acetaminophen **OR** acetaminophen **OR** acetaminophen, albuterol, bisacodyl, docusate sodium, guaiFENesin, heparin, LORazepam, metoprolol, metoprolol, metoprolol, metoprolol, metoprolol tartrate, ondansetron **OR** ondansetron    Lab Review   Results from last 7 days   Lab Units 11/12/23  0618 11/11/23  0556 11/10/23  0719   WBC AUTO x10*3/uL 6.2 6.2 5.7   HEMOGLOBIN g/dL 9.1* 8.2* 8.8*    HEMATOCRIT % 29.8* 26.6* 28.1*   PLATELETS AUTO x10*3/uL 197 206 213       Results from last 7 days   Lab Units 11/12/23  0618 11/11/23  0556 11/10/23  0719 11/09/23  0619 11/08/23  1232   SODIUM mmol/L 137 140 141   < > 141   POTASSIUM mmol/L 3.6 4.0 4.1   < > 4.8   CHLORIDE mmol/L 96* 98 98   < > 100   CO2 mmol/L 25 26 26   < > 31   BUN mg/dL 58* 65* 75*   < > 66*   CREATININE mg/dL 11.45* 12.26* 13.43*   < > 12.14*   CALCIUM mg/dL 7.7* 7.3* 7.7*   < > 7.8*   PROTEIN TOTAL g/dL  --   --   --   --  6.2*   BILIRUBIN TOTAL mg/dL  --   --   --   --  0.3   ALK PHOS U/L  --   --   --   --  70   ALT U/L  --   --   --   --  11   AST U/L  --   --   --   --  22   GLUCOSE mg/dL 123* 92 109*   < > 91    < > = values in this interval not displayed.       Results from last 7 days   Lab Units 11/10/23  0719 11/09/23  1913 11/08/23  1501   TROPHS ng/L 444* 449* 584*          CT angio coronary art with heartflow if score >30%   Final Result   1. Limited study due to motion artifact.   2. Within the limitations scattered calcified plaques are identified   in proximal and mid LAD causing less than 50% stenosis.   3. No significant atherosclerotic calcification or stenosis in left   main and CX.   4. Unable to assess RCA due to motion artifact and beam hardening   artifact caused by pacer/defibrillator wires.   5. Left atrial and left ventricular enlargement.   6. Calcification of the aortic valve. Correlation with aortic valve   stenosis.   7. Moderate sized bilateral pleural effusion with associated   atelectasis. Mild interstitial pulmonary edema. An area of   ground-glass airspace opacity in the right upper lobe which might be   due to edema or developing infiltrate. Correlate clinically and with   concern for infection.   8. Moderate amount of free fluid in the upper abdomen with small   amount of free air which may be due to peritoneal dialysis. Correlate   clinically and correlate with concern for acute abdominal pathology.              MACRO:   Findings were relayed to the ordering nurse practitioner MsTisha Anh at   5:32 p.m., 11/10/2023 via [employer] secure chat.        Signed by: Michelle Truong 11/10/2023 5:41 PM   Dictation workstation:   [id number]      Cardiac device check - Inpatient         Cardiac Device Check - Remote         XR chest 2 views   Final Result   Hazy opacification of the lungs perhaps related to edema or   pneumonitis.             Signed by: Vinny Mckeon 11/8/2023 1:17 PM   Dictation workstation:   [id number]            Physical Exam     Constitutional   General appearance: Alert and in no acute distress.     Pulmonary   Respiratory assessment: No respiratory distress, normal respiratory rhythm and effort.    Auscultation of Lungs: Clear bilateral breath sounds.   Cardiovascular   Auscultation of heart: Apical pulse normal, heart rate and rhythm normal, normal S1 and S2, no murmurs and no pericardial rub.    Exam for edema: No peripheral edema.   Abdomen   Abdominal Exam: Distended abdomen nontender  Liver and Spleen exam: No hepato-splenomegaly.   Musculoskeletal   Examination of gait: ROM intact  Skin inspection: Normal skin color and pigmentation, normal skin turgor and no visible rash.   Neurologic   Cranial nerves: Nerves 2-12 were intact, no focal neuro defects.     Assessment/Plan      #Fluid overload  #End-stage renal disease on peritoneal dialysis  Responded well to Lasix  .  Dialysis as per nephrology    #Hypertension  Stable  Continue home medications    #Dyslipidemia  Stable  Continue home medications    #Demand ischemia  CT angio unremarkable although the right coronary was not clearly visible

## 2023-11-12 NOTE — PROCEDURES
Patient receiving peritoneal dialysis.  Unfortunately Mr. Arzate is intermittently been declining to do his PD.  He notifies us as he is almost ready for discharge he can start his home hemodialysis.  I counseled the patient that he needs to continue to have manual peritoneal hemodialysis while he is waiting for discharge, patient is agreeable to the plan.  He is resting comfortably on room air.  No limb edema.  No crackles auscultated.  Continue his current PD regimen of two 4.25% dextrose infusions and two 2.5% dextrose exchanges and one overnight 2.5% long dwell each with a fill volume of 2 L x 4 hours apiece.  Erythropoietin, phosphorus binder, renal multivitamin and vitamin D are ordered.  Continue PD per submitted orders.

## 2023-11-12 NOTE — CARE PLAN
The patient's goals for the shift include  getting a good night's rest    The clinical goals for the shift include patient's blood pressure will remain stable    Over the shift, the patient did not make progress toward the following goals. Barriers to progression include patient taking off high falls risk socks. Recommendations to address these barriers include education.

## 2023-11-13 NOTE — PROCEDURES
Seen on peritoneal dialysis.  Blood pressures are noted.  It looks as if he is lost lean body mass, we will have to continue to drive down but we consider his dry weight.  He is receiving erythropoietin.  I will have to review the iron stores at [facility].  Monitoring the phosphorus and blood pressure.

## 2023-11-13 NOTE — PROGRESS NOTES
Pedro Arzate is a 73 y.o. male     Continue peritoneal dialysis  Home when cleared by nephrology      Review of Systems     Constitutional: no fever, no chills, not feeling poorly, not feeling tired   Cardiovascular: no chest pain   Respiratory: no cough, wheezing   Gastrointestinal: no abdominal pain, no constipation, no melena, no nausea, no diarrhea, no vomiting and no blood in stools.   Neurological: no headache,   All other systems have been reviewed and are negative for complaint.       Vitals:    11/13/23 1153   BP: 126/65   Pulse: 62   Resp: 18   Temp: 36.6 °C (97.8 °F)   SpO2: 96%        Scheduled medications  allopurinol, 100 mg, oral, Daily  amLODIPine, 10 mg, oral, Daily  aspirin, 81 mg, oral, Daily  atorvastatin, 40 mg, oral, Nightly  calcium polycarbophiL, 625 mg, oral, Daily  dextrose 2.5 % - LOW calcium 2.5 mEq/L 2,000 mL peritoneal dialysate, , intraperitoneal, TID  dextrose 4.25 % - LOW calcium 2.5 mEq/L peritoneal dialysate, , intraperitoneal, BID  epoetin donna or biosimilar, 150 Units/kg, subcutaneous, Weekly  ergocalciferol, 1,250 mcg, oral, Weekly  labetalol, 2 tablet, oral, BID  melatonin, 3 mg, oral, Daily  multivitamin with minerals, 1 tablet, oral, Daily  pantoprazole, 40 mg, intravenous, Daily before breakfast  polyethylene glycol, 17 g, oral, Daily  sevelamer carbonate, 800 mg, oral, 4x daily      Continuous medications       PRN medications  PRN medications: acetaminophen **OR** acetaminophen **OR** acetaminophen, albuterol, bisacodyl, docusate sodium, guaiFENesin, LORazepam, ondansetron **OR** ondansetron    Lab Review   Results from last 7 days   Lab Units 11/13/23  0635 11/12/23 0618 11/11/23  0556   WBC AUTO x10*3/uL 5.9 6.2 6.2   HEMOGLOBIN g/dL 8.6* 9.1* 8.2*   HEMATOCRIT % 24.9* 29.8* 26.6*   PLATELETS AUTO x10*3/uL 186 197 206       Results from last 7 days   Lab Units 11/13/23  0635 11/12/23 0618 11/11/23  0556 11/09/23  0619 11/08/23  1232   SODIUM mmol/L 138 137 140   < >  141   POTASSIUM mmol/L 3.7 3.6 4.0   < > 4.8   CHLORIDE mmol/L 98 96* 98   < > 100   CO2 mmol/L 26 25 26   < > 31   BUN mg/dL 49* 58* 65*   < > 66*   CREATININE mg/dL 11.62* 11.45* 12.26*   < > 12.14*   CALCIUM mg/dL 7.5* 7.7* 7.3*   < > 7.8*   PROTEIN TOTAL g/dL  --   --   --   --  6.2*   BILIRUBIN TOTAL mg/dL  --   --   --   --  0.3   ALK PHOS U/L  --   --   --   --  70   ALT U/L  --   --   --   --  11   AST U/L  --   --   --   --  22   GLUCOSE mg/dL 105* 123* 92   < > 91    < > = values in this interval not displayed.       Results from last 7 days   Lab Units 11/10/23  0719 11/09/23  1913 11/08/23  1501   TROPHS ng/L 444* 449* 584*          CT angio coronary art with heartflow if score >30%   Final Result   1. Limited study due to motion artifact.   2. Within the limitations scattered calcified plaques are identified   in proximal and mid LAD causing less than 50% stenosis.   3. No significant atherosclerotic calcification or stenosis in left   main and CX.   4. Unable to assess RCA due to motion artifact and beam hardening   artifact caused by pacer/defibrillator wires.   5. Left atrial and left ventricular enlargement.   6. Calcification of the aortic valve. Correlation with aortic valve   stenosis.   7. Moderate sized bilateral pleural effusion with associated   atelectasis. Mild interstitial pulmonary edema. An area of   ground-glass airspace opacity in the right upper lobe which might be   due to edema or developing infiltrate. Correlate clinically and with   concern for infection.   8. Moderate amount of free fluid in the upper abdomen with small   amount of free air which may be due to peritoneal dialysis. Correlate   clinically and correlate with concern for acute abdominal pathology.             MACRO:   Findings were relayed to the ordering nurse practitioner Ms. Kamara at   5:32 p.m., 11/10/2023 via [employer] secure chat.        Signed by: Michelle Truong 11/10/2023 5:41 PM   Dictation workstation:    [id number]      Cardiac device check - Inpatient         Cardiac Device Check - Remote         XR chest 2 views   Final Result   Hazy opacification of the lungs perhaps related to edema or   pneumonitis.             Signed by: Vinny Mckeon 11/8/2023 1:17 PM   Dictation workstation:   [id number]            Physical Exam     Constitutional   General appearance: Alert and in no acute distress.     Pulmonary   Respiratory assessment: No respiratory distress, normal respiratory rhythm and effort.    Auscultation of Lungs: Clear bilateral breath sounds.   Cardiovascular   Auscultation of heart: Apical pulse normal, heart rate and rhythm normal, normal S1 and S2, no murmurs and no pericardial rub.    Exam for edema: No peripheral edema.   Abdomen   Abdominal Exam: Distended abdomen nontender  Liver and Spleen exam: No hepato-splenomegaly.   Musculoskeletal   Examination of gait: ROM intact  Skin inspection: Normal skin color and pigmentation, normal skin turgor and no visible rash.   Neurologic   Cranial nerves: Nerves 2-12 were intact, no focal neuro defects.     Assessment/Plan      #Fluid overload  #End-stage renal disease on peritoneal dialysis  Responded well to Lasix  .  Dialysis as per nephrology    #Hypertension  Stable  Continue home medications    #Dyslipidemia  Stable  Continue home medications    #Demand ischemia  CT angio unremarkable although the right coronary was not clearly visible

## 2023-11-13 NOTE — CARE PLAN
The patient's goals for the shift include  getting some rest in between PD treatments.    The clinical goals for the shift include patient's blood pressure will remain stable    Over the shift, the patient did make progress toward the following goals. Barriers to progression include patient's PD treatments here are different then home schedule. Recommendations to address these barriers include taking to the doctor in the morning.

## 2023-11-13 NOTE — DISCHARGE INSTRUCTIONS
Complete MANUAL Peritoneal Dialysis until you receive a new machine.  PLEASE CALL [facility] to notify them of the broken machine.  This is is how they will send you a new machine.

## 2023-11-13 NOTE — PROGRESS NOTES
"Subjective Data:  Patient reports that he has no c/o chest pain or dyspnea.     Discussed Coronary CTA results with patient and he states he has no further questions or concerns.    Overnight Events:    None reported     Objective Data:  Last Recorded Vitals:  Vitals:    23 0300 23 0639 23 0823 23 1153   BP: (!) 131/45  148/57 126/65   BP Location:       Patient Position:       Pulse: 69  72 62   Resp: 18  18 18   Temp: 36.4 °C (97.5 °F)  36.5 °C (97.7 °F) 36.6 °C (97.8 °F)   TempSrc: Temporal      SpO2: 100%  98% 96%   Weight:  92.2 kg (203 lb 4.2 oz)     Height:         Medical Gas Therapy: None (Room air)  O2 Delivery Method: Nasal cannula  Weight  Av.1 kg (180 lb 15.5 oz)  Min: 72.6 kg (160 lb)  Max: 92.2 kg (203 lb 4.2 oz)    LABS:  CMP:  Results from last 7 days   Lab Units 23  0635 23  0618 23  0556 11/10/23  0719 23  0619 23  1232   SODIUM mmol/L 138 137 140 141 141 141   POTASSIUM mmol/L 3.7 3.6 4.0 4.1 4.1 4.8   CHLORIDE mmol/L 98 96* 98 98 100 100   CO2 mmol/L 26 25 26 26 26 31   ANION GAP mmol/L 18 20 20 21* 19 15   BUN mg/dL 49* 58* 65* 75* 72* 66*   CREATININE mg/dL 11.62* 11.45* 12.26* 13.43* 13.02* 12.14*   EGFR mL/min/1.73m*2 4* 4* 4* 3* 4* 4*   ALBUMIN g/dL  --   --   --  3.0*  --  3.1*   ALT U/L  --   --   --   --   --  11   AST U/L  --   --   --   --   --  22   BILIRUBIN TOTAL mg/dL  --   --   --   --   --  0.3     CBC:  Results from last 7 days   Lab Units 23  0635 23  0618 23  0556 11/10/23  0719 11/10/23  0007 23  0619 23  1232   WBC AUTO x10*3/uL 5.9 6.2 6.2 5.7 6.6 5.6 7.8   HEMOGLOBIN g/dL 8.6* 9.1* 8.2* 8.8* 8.8* 8.3* 9.5*   HEMATOCRIT % 24.9* 29.8* 26.6* 28.1* 27.9* 26.9* 30.5*   PLATELETS AUTO x10*3/uL 186 197 206 213 209 200 240   MCV fL 84 95 93 91 92 93 92     COAG:     ABO: No results found for: \"ABO\"  HEME/ENDO:     CARDIAC:   Results from last 7 days   Lab Units 11/10/23  0723 " 11/08/23  1501 11/08/23  1356 11/08/23  1232   TROPHS ng/L 444* 449* 584* 472* 312*   BNP pg/mL  --   --   --   --  2,327*             Last I/O:    Intake/Output Summary (Last 24 hours) at 11/13/2023 1343  Last data filed at 11/13/2023 0944  Gross per 24 hour   Intake 180 ml   Output 6650 ml   Net -6470 ml     Net IO Since Admission: -17,079.9 mL [11/13/23 1343]      Imaging Results:  XR chest 2 views    Result Date: 11/8/2023  Interpreted By:  Vinny Mckeon, STUDY: XR CHEST 2 VIEWS;  11/8/2023 12:57 pm   INDICATION: Signs/Symptoms:SOB.   COMPARISON: 04/02/2023   ACCESSION NUMBER(S): [id number]   ORDERING CLINICIAN: ROSA CANNON   FINDINGS: Pacemaker. Interstitial prominence with hazy opacification of the lungs. No apparent pleural effusion. Cardiomegaly. Aortic atherosclerosis.       Hazy opacification of the lungs perhaps related to edema or pneumonitis.     Signed by: Vinny Mckeon 11/8/2023 1:17 PM Dictation workstation:   [id number]          Cardiac Imaging:  Results for orders placed during the hospital encounter of 11/08/23    CT angio coronary art with heartflow if score >30%    Narrative  Interpreted By:  Michelle Espitia,  STUDY:  CT ANGIO CORONARY ART WITH HEARTFLOW IF SCORE >30%;  11/10/2023 4:55  pm    INDICATION:  Signs/Symptoms:Dyspnea, concern for UA.  .  There is need to define  coronary anatomy.    COMPARISON:  None.    ACCESSION NUMBER(S):  [id number]    ORDERING CLINICIAN:  ISAK CARRILLO    TECHNIQUE:  Using multi-detector CT technology,  axial, sequential imaging with  prospective gating was performed of the chest following the  intravenous administration of contrast material.  A low-osmolar  contrast agent was used ( 80 ml of Optiray 350).    The patient was premedicated with   mg i.v metoprolol and 0.4 mg  sublingual nitroglycerin for heart rate control and coronary  dilation, respectively.    For optimization of anatomic evaluation, multiplanar reconstruction,  maximum intensity  projections, and advanced 3-D off-line  postprocessing were performed on a dedicated stand-alone workstation  under the direct supervision of the interpreting physician.    CT Dose-Length Product (DLP):    mGy/cm  CT Dose Reduction Employed: Yes (Prospective triggering, iterative  reconstruction)    FINDINGS:  POTENTIAL STUDY LIMITATIONS:  None.    CORONARY ARTERIES:    CORONARY ANATOMY:  There is normal origin of the coronary arteries.    LEFT MAIN CORONARY ARTERY:  The left main is normal sized vessel that  trifurcates into  LAD,circumflex artery and ramus intermedius. Small focus of  calcification in the left main coronary artery causing no significant  stenosis.    LEFT ANTERIOR DESCENDING ARTERY:  The LAD is a normal size vessel that  wraps around the apex.  It gives rise to  4 acute diagonal branches.  Scattered calcified plaques in the proximal and mid LAD causing less  than 50% stenosis. There is a calcified plaque in the proximal D1  causing less than 50% stenosis.    LEFT CIRCUMFLEX ARTERY:  The LCX is a normal size vessel, which is  non-dominant.  It gives rise to  2 obtuse marginal branches.  There is no significant atherosclerotic change or stenotic disease.    RAMUS INTERMEDIUS:  The ramus is a small sized vessel.  There is no significant atherosclerotic change or stenotic disease.    RIGHT CORONARY ARTERY:  The RCA is a normal size vessel, which is  dominant .  It gives rise to a  conus branch,  balbir branch, and  2 acute  marginal branches.  In its distal segment it bifurcates into the PDA  and PV branch. Extensive atherosclerotic calcification throughout the  proximal, mid and distal RC A. Assessment for degree of stenosis is  not possible due to motion artifact and beam hardening artifact  caused by pacer/defibrillator wires..    CARDIAC CHAMBERS:  The cardiac chambers demonstrate normal atrioventricular and  ventriculoarterial concordance, and systemic and pulmonary venous  return.    Left  ventricle and left atrial enlargement    AORTIC VALVE:  The aortic valve is  trileaflet in morphology.  Calcification of the  aortic valve. Correlation for aortic valve stenosis.    MITRAL VALVE:  There is moderate to severe mitral annulus/mitral valve calcification.    THORACIC AORTA:  The visualized thoracic aorta is normal in course, caliber, and  contour. There is no acute aortic pathology, such as dissection,  intramural hematoma, or contained rupture. The aortic arch is not  included on this examination.    PULMONARY ARTERY:  Pulmonary artery is slightly dilated measuring 3.2 cm.    PERICARDIUM:  There is no pericardial effusion of thickening.    CHEST:  The chest wall is normal.  No significant lymphadenopathy or mass is seen in limited images of  the mediastinum. There is moderate bilateral pleural effusion with  associated bibasilar atelectasis. Area of ground-glass opacity in the  right upper lobe which might be due to atelectasis/edema or  infectious infiltrate. No pleural effusion or pneumothorax.    UPPER ABDOMEN:  Moderate amount of free fluid in the visualized portion of the upper  abdomen. Small amount of free air in the upper abdomen.    Impression  1. Limited study due to motion artifact.  2. Within the limitations scattered calcified plaques are identified  in proximal and mid LAD causing less than 50% stenosis.  3. No significant atherosclerotic calcification or stenosis in left  main and CX.  4. Unable to assess RCA due to motion artifact and beam hardening  artifact caused by pacer/defibrillator wires.  5. Left atrial and left ventricular enlargement.  6. Calcification of the aortic valve. Correlation with aortic valve  stenosis.  7. Moderate sized bilateral pleural effusion with associated  atelectasis. Mild interstitial pulmonary edema. An area of  ground-glass airspace opacity in the right upper lobe which might be  due to edema or developing infiltrate. Correlate clinically and  with  concern for infection.  8. Moderate amount of free fluid in the upper abdomen with small  amount of free air which may be due to peritoneal dialysis. Correlate  clinically and correlate with concern for acute abdominal pathology.      MACRO:  Findings were relayed to the ordering nurse practitioner Ms. Kamara at  5:32 p.m., 11/10/2023 via [employer] secure chat.    Signed by: Michelle Truong 11/10/2023 5:41 PM  Dictation workstation:   [id number]      Inpatient Medications:  Scheduled medications   Medication Dose Route Frequency    allopurinol  100 mg oral Daily    amLODIPine  10 mg oral Daily    aspirin  81 mg oral Daily    atorvastatin  40 mg oral Nightly    calcium polycarbophiL  625 mg oral Daily    dextrose 2.5 % - LOW calcium 2.5 mEq/L 2,000 mL peritoneal dialysate   intraperitoneal TID    dextrose 4.25 % - LOW calcium 2.5 mEq/L peritoneal dialysate   intraperitoneal BID    epoetin donna or biosimilar  150 Units/kg subcutaneous Weekly    ergocalciferol  1,250 mcg oral Weekly    labetalol  2 tablet oral BID    melatonin  3 mg oral Daily    multivitamin with minerals  1 tablet oral Daily    pantoprazole  40 mg intravenous Daily before breakfast    polyethylene glycol  17 g oral Daily    sevelamer carbonate  800 mg oral 4x daily     PRN medications   Medication    acetaminophen    Or    acetaminophen    Or    acetaminophen    albuterol    bisacodyl    docusate sodium    guaiFENesin    LORazepam    ondansetron    Or    ondansetron     Continuous Medications   Medication Dose Last Rate       Physical Exam:  General:  Patient is awake, alert, and oriented.  Patient is in no acute distress.  Neck:  No thyromegaly.  Normal Jugular Venous Pressure.  Cardiovascular:  paced Rhythm.  Normal S1 and S2. + cardiac murmur  Pulmonary:  Clear to auscultation bilaterally.  Abdomen: Large + bowel sounds, + peritoneal dialysis cath in place  Lower Extremities:  2+ pedal pulses. No LE edema.  Neurologic:  Cranial nerves intact.   No focal deficit.   Skin: Skin warm and dry, normal skin turgor.   Psychiatric: Normal affect.        Assessment/Plan   Pedro Arzate is a 73 y.o. male with a past medical history of  Diastolic HF LVEF 55-60% (last TTE 6/7/22), end-stage renal disease on peritoneal dialysis (follows with Dr. Mccurdy), obstructive sleep apnea,  hypertension, hyperlipidemia, gout, hyperparathyroidism, anemia, peripheral vascular disease, complete heart block status post dual-chamber pacemaker placement, LBBB, who presents with c/o Sob/ cough. Initial EKG reveals paced rhythm, underlying SR, chest x-ray hazy opacification consistent with edema or pneumonitis, troponins 312, 584 (previous HS [id number]).  Cardiology consulted for further evaluation of NSTEMI.     I reviewed EKG, paced rhythm with underlying sinus rhythm  I reviewed Telemetry, no significant events, paced rhythm  I reviewed all labs and imaging reports     #Concern for Unstable Angina:  Coronary CTA reveals (11/10/23) reveals mild to moderate nonobstructive coronary artery disease, but there was suboptimal visualization of the RCA.     # Acute on chronic Diastolic HF in setting of incomplete volume removal from Peritoneal dialysis d/t malfunctioning equip.   Patient reports improvement in breathing s/p IV Lasix 80 mg IV x1   Volume management per Nephrology/ PD     # CHB s/p PPM. Device stable by available matrix     #LBBB (noted on EKG 2022)  Stress Test 8/22> nonischemic     #HTN. Acceptable      #HLD. Continue statin        Rec's:  -Coronary CTA reveals mild to moderate nonobstructive coronary artery disease, but there was suboptimal visualization of the RCA.   -Volume management per Nephrology/ PD  -Continue outpt CV meds  -Patient will benefit from Cardiology follow up with Dr. Quiles post discharge> will request appoint. 4-8 weeks   -No further orders from a cardiac perspective, cardiology will sign off  -Please call with questions    Code Status:  Full  Code        Thank you for allowing me to participate in their care.  Please feel free to call me with any further questions or concerns.    Darvin Valenzuela MD, FACC, DENICE HERNANDEZ  Division of Cardiovascular Medicine  Medical Director, [facility]  [facility]  Assistant Clinical Professor, Medicine  [employer]  [email or web address]  Office:  [phone]     Both the TANJA and I have had a face to face encounter with the patient today. I have examined the patient and edited the documented physical examination as necessary.  I personally reviewed the patient's vital signs, telemetry, recent labs, medications, orders, EKGs, and pertinent cardiac imaging/ echocardiography.  I have reviewed the TANJA's encounter note, approve the TANJA's documentation and have edited the note to reflect my diagnostic and therapeutic plan.

## 2023-11-13 NOTE — CARE PLAN
spoke to Milena @ [facility],[phone] she said they do not have record of a broken machine, she said the patient will need to to manuals until he gets his new machine, she said he needs to call them, tell them what was wrong with it, what it was doing and they can have a new machine to him within 2 days, this info was also given to Dr Mccurdy and NP. Along with bedside RN.

## 2023-11-14 NOTE — PROGRESS NOTES
Discharge Facility:  [facility]  Discharge Diagnosis:  angina, CHF, NSTEMI  Admission Date:  11/8/23  Discharge Date:   11/13/23    PCP Appointment Date:  11/22/23    Specialist Appointment Date: none noted    Hospital Encounter and Summary: Linked   See discharge assessment below for further details     Engagement  Call Start Time: 1235 (11/14/2023 12:37 PM)    Medications  Medications reviewed with patient/caregiver?: Yes (11/14/2023 12:37 PM)  Is the patient having any side effects they believe may be caused by any medication additions or changes?: No (11/14/2023 12:37 PM)  Does the patient have all medications ordered at discharge?: Yes (11/14/2023 12:37 PM)  Is the patient taking all medications as directed (includes completed medication regime)?: Yes (11/14/2023 12:37 PM)  Medication Comments: no med changes per Pedro (11/14/2023 12:37 PM)    Appointments  Does the patient have a primary care provider?: Yes (11/14/2023 12:37 PM)  Care Management Interventions: Educated patient on importance of making appointment (11/14/2023 12:37 PM)  Has the patient kept scheduled appointments due by today?: Yes (11/14/2023 12:37 PM)    Self Management  Has home health visited the patient within 72 hours of discharge?: Not applicable (11/14/2023 12:37 PM)    Patient Teaching  What is the patient's perception of their health status since discharge?: Improving (11/14/2023 12:37 PM)  Is the patient/caregiver able to teach back the hierarchy of who to call/visit for symptoms/problems? PCP, Specialist, Home Health nurse, Urgent Care, ED, 911: Yes (11/14/2023 12:37 PM)

## 2023-11-17 NOTE — DISCHARGE SUMMARY
Discharge Diagnosis  CHF (congestive heart failure), NYHA class I, acute on chronic, combined (CMS/HCC)    Issues Requiring Follow-Up  Nephrology    Test Results Pending At Discharge  Pending Labs       Order Current Status    Heparin Assay, UFH Collected (11/11/23 0556)            Hospital Course  Patient with worsening shortness of breath and fluid overload after malfunction of his PD catheter machine  Dialyzed in the hospital with good results  Discharged in stable condition    Pertinent Physical Exam At Time of Discharge  Physical Exam    Home Medications     Medication List      CHANGE how you take these medications     allopurinol 100 mg tablet; Commonly known as: Zyloprim; What changed:   how much to take     CONTINUE taking these medications     acetaminophen 325 mg tablet; Commonly known as: Tylenol   albuterol 90 mcg/actuation inhaler   amLODIPine 10 mg tablet; Commonly known as: Norvasc   aspirin 81 mg EC tablet   atorvastatin 40 mg tablet; Commonly known as: Lipitor   calcitriol 0.5 mcg capsule; Commonly known as: Rocaltrol   docusate sodium 100 mg capsule; Commonly known as: Colace   ergocalciferol 1.25 MG (65960 UT) capsule; Commonly known as: Vitamin   D-2   ferrous sulfate (325 mg ferrous sulfate) tablet   hydrALAZINE 100 mg tablet; Commonly known as: Apresoline   labetalol 200 mg tablet; Commonly known as: Normodyne   multivitamin with minerals tablet   polyethylene glycol 17 gram/dose powder; Commonly known as: Glycolax,   Miralax   sevelamer carbonate 800 mg tablet; Commonly known as: Renvela   sodium bicarbonate 650 mg tablet   torsemide 20 mg tablet; Commonly known as: Demadex       Outpatient Follow-Up  Future Appointments   Date Time Provider Department Susan   11/20/2023  9:40 AM ABDELRAHMAN Nevarez [id number] [facility]   11/22/2023  2:30 PM Grey Dupont MD [id number] [facility]       Bryce Dewitt MD

## 2023-11-20 PROBLEM — I35.1 AORTIC VALVE REGURGITATION: Status: ACTIVE | Noted: 2023-01-01

## 2023-11-20 PROBLEM — I50.32 CHRONIC DIASTOLIC HEART FAILURE (MULTI): Status: ACTIVE | Noted: 2023-01-01

## 2023-11-20 PROBLEM — R06.02 SHORTNESS OF BREATH: Status: ACTIVE | Noted: 2023-01-01

## 2023-11-20 NOTE — PROGRESS NOTES
"Chief Complaint:   Hospital Follow-up (11/8 admit for SOB)     History Of Present Illness:  71 y/o with PMH of HFpEF 6/7/22, ESRD 2/2 to HTN, complete heart block s/p PPM ([employer]), on peritoneal dialysis and secondary hyperparathyroidism, anemia, HTN, duodenal ulcer, SHARON, HLD and PVD. He is here as a follow up.     The patient reports that he was recently hospitalized with acute on chronic diastolic heart failure. Patient reports that he presented to the hospitals d/t shortness of breath. Since discharge he has only  noticed slight improvement. Patient denies chest pain and angina.  Pt denies  orthopnea, and paroxysmal nocturnal dyspnea.  Pt denies worsening lower extremity edema.  Pt denies palpitations or syncope.  No recent falls.  No fever or chills.  No cough.  No change in bowel or bladder habits.  No sick contacts.  No recent travel.    12 point review of systems including (Constitutional, Eyes, ENMT, Respiratory, Cardiac, Gastrointestinal, Neurological, Psychiatric, and Hematologic) was performed and is otherwise negative.       Last Recorded Vitals:  Vitals:    11/20/23 1007   BP: 110/62   Pulse: 72   SpO2: 94%   Weight: 88.9 kg (196 lb)   Height: 1.651 m (5' 5\")       Past Medical History:  He has a past medical history of Abnormal findings on diagnostic imaging of other specified body structures (05/27/2021), Abnormal weight gain (05/01/2017), Acute kidney failure, unspecified (CMS/[facility]) (10/24/2016), Chronic kidney disease, stage 3 unspecified (CMS/HCC) (03/08/2019), Cyst of kidney, acquired (08/07/2015), Disorder of kidney and ureter, unspecified (07/07/2020), Encounter for immunization (09/25/2018), Essential (primary) hypertension (07/09/2015), Hyperglycemia, unspecified (06/02/2017), Hypersomnia, unspecified (02/17/2017), Hypertensive chronic kidney disease with stage 1 through stage 4 chronic kidney disease, or unspecified chronic kidney disease (02/22/2022), Hypertensive chronic kidney disease " with stage 1 through stage 4 chronic kidney disease, or unspecified chronic kidney disease (02/22/2022), Hypertensive chronic kidney disease with stage 1 through stage 4 chronic kidney disease, or unspecified chronic kidney disease (02/22/2022), Hypertensive chronic kidney disease with stage 1 through stage 4 chronic kidney disease, or unspecified chronic kidney disease (02/22/2022), Hypertensive heart disease without heart failure (08/07/2015), Hypoxemia (10/17/2022), Olecranon bursitis, right elbow (03/22/2016), Other chest pain (06/27/2016), Other forms of dyspnea (10/17/2022), Pain in leg, unspecified (09/26/2017), Pain in right leg (10/02/2019), Pain in unspecified elbow (02/01/2016), Personal history of diseases of the skin and subcutaneous tissue (08/04/2020), Personal history of diseases of the skin and subcutaneous tissue (02/14/2014), Personal history of other diseases of the circulatory system (01/21/2015), Personal history of other diseases of the circulatory system (08/31/2020), Personal history of other diseases of the circulatory system (09/27/2021), Personal history of other diseases of the nervous system and sense organs (06/30/2021), Personal history of other diseases of the respiratory system (11/30/2022), Personal history of other endocrine, nutritional and metabolic disease (10/04/2019), Personal history of other endocrine, nutritional and metabolic disease (10/25/2016), Personal history of other infectious and parasitic diseases (05/23/2017), Personal history of other specified conditions (02/17/2017), Personal history of other specified conditions (04/16/2020), Personal history of other specified conditions (09/27/2021), Personal history of other specified conditions (09/26/2017), Personal history of other specified conditions (05/11/2021), Personal history of pneumonia (recurrent) (11/13/2022), Prediabetes (02/22/2022), Shortness of breath (10/17/2022), and Tinea unguium (12/16/2019).    Past  Surgical History:  He has a past surgical history that includes Other surgical history (09/27/2021); Cataract extraction (09/25/2018); and CT angio coronary art with heartflow if score >30% (11/10/2023).      Social History:  He reports that he quit smoking about 36 years ago. His smoking use included cigarettes. He has never used smokeless tobacco. He reports current alcohol use of about 1.0 standard drink of alcohol per week. He reports that he does not use drugs.    Family History:  Family History   Problem Relation Name Age of Onset    Dementia Mother      Kidney failure Father          end stage        Allergies:  Patient has no known allergies.    Outpatient Medications:  Current Outpatient Medications   Medication Instructions    acetaminophen (Tylenol) 325 mg tablet TAKE  2 TABLETS EVERY 4 HOURS AS NEEDED FOR PAIN    albuterol 90 mcg/actuation inhaler 2 puffs, inhalation, Every 4 hours PRN    allopurinol (ZYLOPRIM) 100 mg, oral, Daily    amLODIPine (Norvasc) 10 mg tablet TAKE 1 TABLET DAILY FOR BLOOD PRESSURE.    aspirin 81 mg EC tablet TAKE 1 TABLET DAILY AS DIRECTED.    atorvastatin (Lipitor) 40 mg tablet TAKE 1 TABLET AT BEDTIME.    calcitriol (Rocaltrol) 0.5 mcg capsule TAKE 1 CAPSULE ORALLY 3 TIMES A DAY    docusate sodium (Colace) 100 mg capsule TAKE 1 CAPSULE TWICE DAILY AS NEEDED.    ergocalciferol (Vitamin D-2) 1.25 MG (01491 UT) capsule TAKE 1 CAPSULE WEEKLY.    ferrous sulfate (325 mg ferrous sulfate) 325 mg, oral, Daily with breakfast    hydrALAZINE (Apresoline) 100 mg tablet TAKE 1 TABLET Every twelve hours    labetalol (Normodyne) 200 mg tablet TAKE 1 TABLET TWICE DAILY.    multivitamin with minerals (multivitamin-iron-folic acid) tablet 1 tablet, oral, Daily    polyethylene glycol (Glycolax) 17 gram/dose powder MIX 1 CAPFUL IN 8 OUNCES OF WATER AND DRINK AT BEDTIME AS NEEDED FOR CONSTIPATION.    sevelamer carbonate (Renvela) 800 mg tablet Take 1 tablet 4 times a day (before meals and at  bedtime)    sodium bicarbonate 650 mg tablet Take 1 tablet (650 mg) by mouth 2 times a day.    torsemide (Demadex) 20 mg tablet TAKE 1 TABLET DAILY AS DIRECTED.       Physical Exam  Constitutional:       Appearance: Normal appearance.   Neck:      Vascular: No carotid bruit.   Cardiovascular:      Rate and Rhythm: Normal rate and regular rhythm.      Pulses: Normal pulses.      Heart sounds: Normal heart sounds.      No gallop.   Pulmonary:      Effort: Pulmonary effort is normal.      Breath sounds: Normal breath sounds.   Abdominal:      Palpations: Abdomen is soft.   Musculoskeletal:      Cervical back: Neck supple.   Skin:     General: Skin is warm.      Capillary Refill: Capillary refill takes less than 2 seconds.   Neurological:      General: No focal deficit present.      Mental Status: He is alert and oriented to person, place, and time.   Psychiatric:         Mood and Affect: Mood normal.     Last Labs:  CBC -  Lab Results   Component Value Date    WBC 5.9 11/13/2023    HGB 8.6 (L) 11/13/2023    HCT 24.9 (L) 11/13/2023    MCV 84 11/13/2023     11/13/2023       CMP -  Lab Results   Component Value Date    CALCIUM 7.5 (L) 11/13/2023    PHOS 6.6 (H) 11/10/2023    PROT 6.2 (L) 11/08/2023    ALBUMIN 3.0 (L) 11/10/2023    AST 22 11/08/2023    ALT 11 11/08/2023    ALKPHOS 70 11/08/2023    BILITOT 0.3 11/08/2023     LIPID PANEL -   Lab Results   Component Value Date    CHOL 235 (H) 03/17/2023    TRIG 134 03/17/2023    HDL 52.0 03/17/2023    CHHDL 4.5 03/17/2023    LDLF 156 (H) 03/17/2023    VLDL 27 03/17/2023       RENAL FUNCTION PANEL -   Lab Results   Component Value Date    GLUCOSE 105 (H) 11/13/2023     11/13/2023    K 3.7 11/13/2023    CL 98 11/13/2023    CO2 26 11/13/2023    ANIONGAP 18 11/13/2023    BUN 49 (H) 11/13/2023    CREATININE 11.62 (H) 11/13/2023    GFRMALE CANCELED 09/20/2023    CALCIUM 7.5 (L) 11/13/2023    PHOS 6.6 (H) 11/10/2023    ALBUMIN 3.0 (L) 11/10/2023        Last Cardiology  Tests:  ECG:  ECG 12 lead 11/17/2023    Echo: 6/2022  CONCLUSIONS:   1. The left ventricular systolic function is normal with a 55-60% estimated ejection fraction.   2. Abnormal septal motion consistent with RV pacemaker.   3. There is mild hypokinesis and dyssynchrony of the anteroseptal wall.   4. There is moderate concentric left ventricular hypertrophy.   5. The left atrium is moderately dilated.   6. There is severe mitral annular calcification.   7. Slightly elevated RVSP.   8. Aortic valve sclerosis.   9. There is mild to moderate aortic valve regurgitation.  10. The estimated PASP is 39 mmHg.  11. The peak instantaneous gradient of the aortic valve is 29.8 mmHg.  12. There is plaque visualized in the ascending aorta.       Stress Test:  Nuclear Stress Test 3/7/2023  FINDINGS:  Stress and rest images both demonstrate a normal distribution of  perfusion throughout all LV segments with no sign of ischemia except  for mild reduction in perfusion of the inferior wall at rest and at  stress with preserved wall motion most consistent with diaphragmatic  attenuation. Inferior wall ischemia cannot be completely excluded.    Cardiac Imaging:  CT angio coronary art with heartflow if score >30% 11/10/2023  IMPRESSION:  1. Limited study due to motion artifact.  2. Within the limitations scattered calcified plaques are identified  in proximal and mid LAD causing less than 50% stenosis.  3. No significant atherosclerotic calcification or stenosis in left  main and CX.  4. Unable to assess RCA due to motion artifact and beam hardening  artifact caused by pacer/defibrillator wires.  5. Left atrial and left ventricular enlargement.  6. Calcification of the aortic valve. Correlation with aortic valve  stenosis.  7. Moderate sized bilateral pleural effusion with associated  atelectasis. Mild interstitial pulmonary edema. An area of  ground-glass airspace opacity in the right upper lobe which might be  due to edema or developing  infiltrate. Correlate clinically and with  concern for infection.  8. Moderate amount of free fluid in the upper abdomen with small  amount of free air which may be due to peritoneal dialysis. Correlate  clinically and correlate with concern for acute abdominal pathology.    Assessment/Plan     71 y/o with PMH of HFpEF 6/7/22, ESRD 2/2 to HTN, complete heart block s/p PPM ([employer]), on peritoneal dialysis and secondary hyperparathyroidism, anemia, HTN, duodenal ulcer, SHARON, HLD and PVD. He is here as a follow up.     The patient reports that he was recently hospitalized after presenting with SOB. He was found to be in acute on chronic diastolic heart failure in the setting of incomplete volume removal from peritoneal dialysis. Probable type II MI in sett of ESRD, Probable Volume Overload d/t Home dialysis equip malfunction.  Since discharge he has only noticed slight improvement. Per patient still significantly SOB with ambulation.    - We will obtain a transthoracic echocardiogram for structural evaluation including ejection fraction, assessment of regional wall motion abnormalities or valvular disease, and further evaluation of hemodynamics.    - Increase torsemide to 40 mg daily. Patient to follow up with Nephrology tomorrow for Fluid volume management.     - Follow up in 6 weeks    Kristine Winters, MIRNA-CNP

## 2023-11-20 NOTE — PATIENT INSTRUCTIONS
Mr. Arzate,    Increase Torsemide 40 mg daily    Follow up with Nephrologist tomorrow    We will obtain a transthoracic echocardiogram for structural evaluation including ejection fraction, assessment of regional wall motion abnormalities or valvular disease, and further evaluation of hemodynamics.    Follow up in 6 weeks    Kristine BRADLEY-VIPIN

## 2023-11-22 NOTE — PROGRESS NOTES
"Subjective   Patient ID: Pedro Arzate is a 73 y.o. male who presents for Hospital Follow-up.    HPI   Hospitalized 11/8/23 to 11/13/23 for CHF exacerbation after malfunction of peritoneal dialysis catheter machine.  Procedures: Dialysis  Medication changes: Allopurinol dose changed.  Advised to follow up with: Cardiology (seen 11/20/23, ECHO ordered, Torsemide increased), Nephrology.  Outpatient recommendations: N/A.  Since hospital discharge: Still feels a little tired, occasional SOB in the morning (no acute exacerbation), no chest pain.    Discharge summary was reviewed.  Transitional Care Management documentation was reviewed.   Medication reconciliation was performed as indicated via the \"Luis as Reviewed\" timestamp.   The complexity of medical decision making for this patient's transitional care is moderate.    Review of Systems  No other complaints.     Objective   /64   Pulse 82   Resp 16   Ht 1.651 m (5' 5\")   Wt 90.4 kg (199 lb 3.2 oz)   SpO2 91%   BMI 33.15 kg/m²     Physical Exam  Constitutional:       General: He is not in acute distress.     Appearance: He is obese.   Cardiovascular:      Rate and Rhythm: Normal rate and regular rhythm.      Heart sounds: Murmur (2-3/6, systolic) heard.   Pulmonary:      Effort: Pulmonary effort is normal.      Breath sounds: Normal breath sounds. No wheezing, rhonchi or rales.   Neurological:      Mental Status: He is oriented to person, place, and time.   Psychiatric:         Mood and Affect: Mood normal.         Behavior: Behavior normal.     Assessment/Plan   Diagnoses and all orders for this visit:  Hospital discharge follow-up  CHF (congestive heart failure), NYHA class I, acute on chronic, combined (CMS/HCC)  CKD stage 5 secondary to hypertension (CMS/HCC)    Continue current medications.  Follow up with specialists as directed.    F/U for med refills when needed.  "

## 2023-11-22 NOTE — PATIENT INSTRUCTIONS
Continue current medications.  Follow up with specialists as directed.    F/U for med refills when needed.

## 2023-11-23 PROBLEM — I46.9 CARDIAC ARREST (MULTI): Status: ACTIVE | Noted: 2023-01-01

## 2023-11-23 NOTE — ED NOTES
0911 pulse check PEA  0913 pulse check PEA  0915 pulse check PEA  0918 pulse check PEA       Jody Mcgrath RN  11/23/23 [id number]

## 2023-11-23 NOTE — PROGRESS NOTES
Pedro Arzate is a 73 y.o. male on day 0 of admission presenting with Cardiac arrest (CMS/[facility]). Critical Care HISTORY AND PHYSICAL EXAM     Subjective   HD # 1 for this 73 year old male who was admitted from the ED to the ICU in extremis after a cardiac arrest.     This 72 YO male  was transported to  the ER in a private vehicle with complaints of shortness of breath after missing his regularly scheduled PD session secondary to not having a cycler machine, His last dialysis was this thought to be Tuesday. He has MMP including:  hypertension, ESRD on home peritoneal dialysis, CHF, and multiple recent hospitalizations for CHF exacerbations. Reports or unclear however it appears that he became acutely short of breath this morning was transported to the ED in a private car, however could not walk to the triage area. His Wife went to triage to obtain help and when help from triage arrived, he was discovered in full arrest when staff form the ED arrived. Confirmation of arrest after transport to the ED Care timeline appears to reveal that the code started at 0910 he was in the ED at 0915  (notation for arrival) the code ended at 1118 hrs. per report . During the code after documentation of asystole ACLS was started initial rhythm reported as ASYSTOLE. He was intubated but this was noted to require two ETT exchanges secondary to cuff rupture. He remained hypotensive after ROSC and required vasopressor agents (two). He was transferred to the ICU with low systolic BP's with a newly placed central line in his left IJ.       PMH   Essential (primary) hypertension 07/09/2015  Hyperglycemia, unspecified 06/02/2017  Hypersomnia, unspecified 02/17/2017  CKD stage 4 secondary to hypertension  Hypertensive heart disease without heart failure 08/07/2015  LVH (left ventricular hypertrophy) due to hypertensive disease  Hypoxemia 10/17/2022  Olecranon bursitis, right elbow 03/22/2016  Dyspnea on exertion  Bilateral leg and foot  pain  Complete atrioventricular block  Pulmonary edema  Obesity  Syncope  Bradycardia  Pneumonia    Surgical History   Cataract extraction   09/25/2018  Ct angio coronary art with heartflow if score >30% 11/10/2023 [facility] CT  Pacemaker insertion     SH reformed smoker quit 1987 social alcohol use    NO sedation running upon arrival 2 mm pupils NR to  bright light ,  dolls eyes, NR to voice, pain in LUE, LLE and RLE weakly withdraws RUE GCS 5 = 1+ 1+ 4. Symmetric chest expansion, Not tachycardic, ABD PD cath in place generalized whole body myoclonus S/P arrest of unspecified length Plan ongoing resuscitation obtain EEG once techs available past holiday weekend , neurology consult to assist in  prognosis MRI   when obtainable high suspicion for anoxic encephalopathy        Objective     Physical Exam    Last Recorded Vitals  Blood pressure (!) 105/44, pulse 62, resp. rate (!) 8, SpO2 99 %.     This patient has a central line for vasoactive infusions      This patient is intubated   Reason for patient to remain intubated today? we are providing ongoing neuro resuscitation             Assessment/Plan   Principal Problem:    Cardiac arrest (CMS/HCC)    NO sedation running upon arrival 2 mm pupils NR to  bright light ,  dolls eyes, NR to voice, pain in LUE, LLE and RLE weakly withdraws RUE GCS 5 = 1+ 1+ 4. Symmetric chest expansion, Not tachycardic, ABD PD cath in place generalized whole body myoclonus S/P arrest of unspecified length Plan ongoing resuscitation obtain EEG once techs available past holiday weekend , neurology consult to assist in  prognosis MRI   when obtainable high suspicion for anoxic encephalopathy           I spent 60 minutes in the professional and overall care of this patient. In the creation of this History and Physical       Nikita Nair MD

## 2023-11-23 NOTE — ED PROVIDER NOTES
HPI   Chief Complaint   Patient presents with    Cardiac Arrest       HPI: []  73-year-old Afro-American male with a history of hypertension, ESRD on home peritoneal dialysis, CHF, recently hospitalized for CHF today comes in with cardiopulmonary rest.  History obtained from the patient's wife.  Wife states for the last few days patient was complaining of trouble breathing shortness of breath this morning became acutely short of breath initially refused transport but then the wife put him in the car and she drove him to the ER.  As a pulled into the parking lot patient became unresponsive the wife called for help when EMTs went out to the parking lot to pull him out of the car he was unresponsive he was immediately brought back to the acute trauma room and I was present for assessment.  Upon arrival patient had no pulse no blood pressure and respiration.  Asystole.  High-quality CPR initiated promptly, patient bagged, IV established, left AC, right tibial IO established, patient was given multiple rounds of epinephrine CPR continued patient intubated with a 7 and half ET tube, initially the cuff was ruptured so changed to twice over the bougie, patient had pink frothy sputum to the ET tube, we got ROSC, IO taken out, left IJ central line placed, patient was given initially IV fluids followed by norepinephrine followed by epinephrine drip, patient's pupils remain 2 mm, patient was breathing over the vent, with minimal response also, was briefly placed on propofol for sedation because he was waking up but then it was stopped because he was hypotensive.    Past history: Hypertension, ESRD on peritoneal dialysis, CHF, dyslipidemia  Social: No history of tobacco alcohol drug use.  REVIEW OF SYSTEMS:  Obtained from the patient's wife  GENERAL.: No weight loss, fatigue, anorexia, insomnia, fever.    EYES: No vision loss, double vision, drainage, eye pain.    ENT: No pharyngitis, dry mouth.    CARDIOPULMONARY: No chest  pain, palpitations, syncope, near syncope.  Positive for shortness of breath, cough, hemoptysis.    GI: No abdominal pain, change in bowel habits, melena, hematemesis, hematochezia, nausea, vomiting, diarrhea.    : No discharge, dysuria, frequency, urgency, hematuria.    MS: No limb pain, joint pain, joint swelling.    SKIN: No rashes.    PSYCH: No depression, anxiety, suicidality, homicidality.    Review of systems is otherwise negative unless stated above or in history of present illness.  Social history, family history, allergies reviewed.  PHYSICAL EXAM:    GENERAL: Patient unresponsive no pulse no blood pressure no respirations    EENT: TMs clear. Posterior oropharynx unremarkable. No meningismus. No LAD.  Pale conjunctiva positive oral secretions    NECK: Supple. Nontender. No midline tenderness.     CARDIAC: S0    PULMONARY: Negative spontaneous respiration    ABDOMEN: Soft, PD catheter in place, bowel sounds difficult to assess due to CPR    EXTREMITIES: No peripheral edema.     SKIN: No rash. Intact.     NEURO: Patient unresponsive.    MEDICAL DECISION MAKING:  EKG once we got ROSC showed a paced rhythm normal axis wide QRS complexes rate in the mid 60s with no acute ischemic changes.  CBC today shows no leukocytosis stable hemoglobin chemistry show elevated BUN/creatinine worse than his baseline, potassium 5.5, lactate about 7, initial ABG postintubation showed pH of 7.16 pCO2 in the 80s, troponin elevated BNP elevated D-dimer elevated, chest ray shows cardiomegaly with bilateral infiltrates concerning for pulmonary edema, post central insertion chest x-ray again confirmed viral infiltrates could line placement with no pneumothorax.  Upon arrival we established 2 peripheral IVs initially placed right tibial IO which was later removed and also placed left IJ central triple-lumen catheter, patient was given IV fluids and was given intravenous norepinephrine and epinephrine and also received boluses  epinephrine and sodium bicarbonate during the CPR.  Patient ET tube cuff was deflated most likely a ruptured while being intubated so I had to change the tube twice over the bougie without any complications.    Impression: #1 cardiopulmonary arrest with ROSC, #2 pulm edema    Plan/MDM: 73-year-old  male history of hypertension ESRD on home peritoneal dialysis comes in with acute cardiopulmonary rest mostly triggered by respiratory failure due to pulmonary edema although infection includes a STEMI pulm embolism septic shock although unlikely, patient received CPR high-quality x 3 rounds, we got ROSC, patient's blood pressure remains labile he received IV fluids followed by norepinephrine and epinephrine, briefly was on a propofol drip also for sedation which we took off eventually IO was taken out, and patient was transferred to ICU for further care.  Discussed with Dr. Nair ICU, and patient's wife at bedside was Updated during the code and post ROSC.                              No data recorded                Patient History   Past Medical History:   Diagnosis Date    Abnormal findings on diagnostic imaging of other specified body structures 05/27/2021    Abnormal CXR    Abnormal weight gain 05/01/2017    Abnormal weight gain    Acute kidney failure, unspecified (CMS/[facility]) 10/24/2016    Acute kidney injury    Chronic kidney disease, stage 3 unspecified (CMS/[facility]) 03/08/2019    Chronic kidney disease (CKD), stage III (moderate)    Cyst of kidney, acquired 08/07/2015    Renal cyst    Disorder of kidney and ureter, unspecified 07/07/2020    Acute on chronic renal insufficiency    Encounter for immunization 09/25/2018    Encounter for immunization    Essential (primary) hypertension 07/09/2015    Benign essential hypertension    Hyperglycemia, unspecified 06/02/2017    Borderline hyperglycemia    Hypersomnia, unspecified 02/17/2017    Excessive sleepiness    Hypertensive chronic kidney disease with stage 1  through stage 4 chronic kidney disease, or unspecified chronic kidney disease 02/22/2022    CKD stage 4 secondary to hypertension    Hypertensive chronic kidney disease with stage 1 through stage 4 chronic kidney disease, or unspecified chronic kidney disease 02/22/2022    CKD stage 4 secondary to hypertension    Hypertensive chronic kidney disease with stage 1 through stage 4 chronic kidney disease, or unspecified chronic kidney disease 02/22/2022    CKD stage 4 secondary to hypertension    Hypertensive chronic kidney disease with stage 1 through stage 4 chronic kidney disease, or unspecified chronic kidney disease 02/22/2022    CKD stage 4 secondary to hypertension    Hypertensive heart disease without heart failure 08/07/2015    LVH (left ventricular hypertrophy) due to hypertensive disease    Hypoxemia 10/17/2022    Hypoxia    Olecranon bursitis, right elbow 03/22/2016    Olecranon bursitis of right elbow    Other chest pain 06/27/2016    Chest wall pain    Other forms of dyspnea 10/17/2022    Dyspnea on exertion    Pain in leg, unspecified 09/26/2017    Lower extremity pain    Pain in right leg 10/02/2019    Bilateral leg and foot pain    Pain in unspecified elbow 02/01/2016    Elbow pain    Personal history of diseases of the skin and subcutaneous tissue 08/04/2020    History of contact dermatitis    Personal history of diseases of the skin and subcutaneous tissue 02/14/2014    History of dermatitis    Personal history of other diseases of the circulatory system 01/21/2015    History of cardiac murmur    Personal history of other diseases of the circulatory system 08/31/2020    History of hypertension    Personal history of other diseases of the circulatory system 09/27/2021    History of complete atrioventricular block    Personal history of other diseases of the nervous system and sense organs 06/30/2021    History of acute conjunctivitis    Personal history of other diseases of the respiratory system  2022    History of pulmonary edema    Personal history of other endocrine, nutritional and metabolic disease 10/04/2019    History of obesity    Personal history of other endocrine, nutritional and metabolic disease 10/25/2016    History of hyperkalemia    Personal history of other infectious and parasitic diseases 2017    History of viral infection    Personal history of other specified conditions 2017    History of snoring    Personal history of other specified conditions 2020    History of dysuria    Personal history of other specified conditions 2021    History of syncope    Personal history of other specified conditions 2017    History of itching    Personal history of other specified conditions 2021    History of bradycardia    Personal history of pneumonia (recurrent) 2022    History of pneumonia    Prediabetes 2022    Prediabetes    Shortness of breath 10/17/2022    Shortness of breath at rest    Tinea unguium 2019    Mycotic toenails     Past Surgical History:   Procedure Laterality Date    CATARACT EXTRACTION  2018    Cataract Surgery    CT ANGIO CORONARY ART WITH HEARTFLOW IF SCORE >30%  11/10/2023    CT ANGIO CORONARY ART WITH HEARTFLOW IF SCORE >30% 11/10/2023 [facility]    OTHER SURGICAL HISTORY  2021    Pacemaker insertion     Family History   Problem Relation Name Age of Onset    Dementia Mother      Kidney failure Father          end stage     Social History     Tobacco Use    Smoking status: Former     Types: Cigarettes     Quit date:      Years since quittin.9    Smokeless tobacco: Never   Substance Use Topics    Alcohol use: Yes     Alcohol/week: 1.0 standard drink of alcohol     Types: 1 Cans of beer per week    Drug use: Never       Physical Exam   ED Triage Vitals   Temp Heart Rate Resp BP   -- 23 0930 23 0930 23 0930    78 22 163/67      SpO2 Temp src Heart Rate Source Patient Position   23  0930 -- -- --   100 %         BP Location FiO2 (%)     -- 11/23/23 0941      80 %       Physical Exam    ED Course & MDM   ED Course as of 11/23/23 1207   Thu Nov 23, 2023   1155 Patient came in unresponsive, wife states that for the last few days was having trouble breathing this morning became short of breath initially refused to go to the hospital but then the wife brought him into the ER as they pulled in the parking lot he became unresponsive wife called for help our medics went out to the parking lot patient was found unresponsive no respirations no pulse he was brought to the trauma room #2 emergently, patient assessed immediately no pulse no blood pressure asystole high-quality CPR initiated, patient received multiple rounds of medications intubated with 7F ET tube, with ROSC, given IV fluids followed by norepinephrine and epinephrine left IJ central line placed, chest x-ray concerning for pulmonary edema, pupils are 2 mm, not fixed or dilated, patient fighting the vent, labs reviewed before metabolic acidosis improving no leukocytosis stable hemoglobin troponin elevated EKG shows a paced rhythm, discussed with ICU attending Dr. Nair and will be transferred to ICU for further care.  Wife at bedside was updated multiple times. [MT]      ED Course User Index  [MT] Nakita Bustos MD         Diagnoses as of 11/23/23 1207   Cardiac arrest (CMS/HCC)   Respiratory arrest (CMS/HCC)   Acute pulmonary edema (CMS/HCC)       Medical Decision Making      Procedure  Critical Care    Performed by: Nakita Bustos MD  Authorized by: Nakita Bustos MD    Critical care provider statement:     Critical care time (minutes):  90    Critical care time was exclusive of:  Teaching time    Critical care was necessary to treat or prevent imminent or life-threatening deterioration of the following conditions:  Cardiac failure, circulatory failure, metabolic crisis, shock and respiratory failure    Critical care was time  spent personally by me on the following activities:  Blood draw for specimens, discussions with consultants, evaluation of patient's response to treatment, examination of patient, ventilator management, vascular access procedures, review of old charts, re-evaluation of patient's condition, pulse oximetry, ordering and review of radiographic studies, ordering and review of laboratory studies and ordering and performing treatments and interventions    I assumed direction of critical care for this patient from another provider in my specialty: no      Care discussed with: admitting provider    Central Line    Performed by: Nakita Bustos MD  Authorized by: Nakita Bustos MD    Consent:     Consent obtained:  Emergent situation    Consent given by:  Spouse    Risks discussed:  Arterial puncture, incorrect placement, nerve damage, bleeding and infection    Alternatives discussed:  No treatment, delayed treatment, alternative treatment, observation and referral  Universal protocol:     Procedure explained and questions answered to patient or proxy's satisfaction: yes      Relevant documents present and verified: yes      Test results available: yes      Required blood products, implants, devices, and special equipment available: yes      Site/side marked: yes      Immediately prior to procedure, a time out was called: yes      Patient identity confirmed:  Arm band  Pre-procedure details:     Indication(s): insufficient peripheral access      Hand hygiene: Hand hygiene performed prior to insertion      Sterile barrier technique: All elements of maximal sterile technique followed      Skin preparation:  Povidone-iodine and alcohol    Skin preparation agent: Skin preparation agent completely dried prior to procedure    Sedation:     Sedation type:  None  Anesthesia:     Anesthesia method:  Local infiltration    Local anesthetic:  Lidocaine 1% WITH epi  Procedure details:     Location:  L internal jugular    Patient  position:  Trendelenburg    Procedural supplies:  Triple lumen    Catheter size:  8 Fr    Landmarks identified: yes      Ultrasound guidance: no      Number of attempts:  1    Successful placement: yes    Post-procedure details:     Post-procedure:  Dressing applied and line sutured    Assessment:  Blood return through all ports, no pneumothorax on x-ray, placement verified by x-ray and free fluid flow    Procedure completion:  Tolerated  Intubation    Performed by: Nakita Bustos MD  Authorized by: Nakita Bustos MD    Consent:     Consent obtained:  Emergent situation  Universal protocol:     Patient identity confirmed:  Arm band  Pre-procedure details:     Indications: cardio/pulmonary arrest      Patient status:  Unresponsive    Look externally: large incisors and large tongue      Mouth opening - incisor distance:  1 finger width    Hyoid-mental distance: 2 finger widths      Hyoid-thyroid distance: 1 finger width      Mallampati score:  III    Obstruction: none      Neck mobility: normal      Pharmacologic strategy: none      Induction agents:  None    Paralytics:  None  Procedure details:     Preoxygenation:  Bag valve mask    CPR in progress: yes      Number of attempts:  1  Successful intubation attempt details:     Intubation method:  Oral    Intubation technique: video assisted      Laryngoscope blade:  Hypercurved and Mac 4    Bougie used: yes      Grade view: III      Tube size (mm):  7.5    Tube type:  Cuffed    Tube visualized through cords: yes    Placement assessment:     Tube secured with:  ETT miramontes    Breath sounds:  Equal    Placement verification: chest rise, colorimetric ETCO2 and CXR verification      CXR findings:  Appropriate position  Post-procedure details:     Procedure completion:  Tolerated       Nakita Bustos MD  11/23/23 2289

## 2023-11-23 NOTE — H&P
"History Of Present Illness  Pedro Arzate is a 73 y.o. male presenting with for details of this admission see the note on the same day which I personally authored with a heading of \"Progress Note\" but annotated as HISTORY AND PHYSICAL .     Past Medical History  Past Medical History:   Diagnosis Date    Abnormal findings on diagnostic imaging of other specified body structures 05/27/2021    Abnormal CXR    Abnormal weight gain 05/01/2017    Abnormal weight gain    Acute kidney failure, unspecified (CMS/[facility]) 10/24/2016    Acute kidney injury    Chronic kidney disease, stage 3 unspecified (CMS/[facility]) 03/08/2019    Chronic kidney disease (CKD), stage III (moderate)    Cyst of kidney, acquired 08/07/2015    Renal cyst    Disorder of kidney and ureter, unspecified 07/07/2020    Acute on chronic renal insufficiency    Encounter for immunization 09/25/2018    Encounter for immunization    Essential (primary) hypertension 07/09/2015    Benign essential hypertension    Hyperglycemia, unspecified 06/02/2017    Borderline hyperglycemia    Hypersomnia, unspecified 02/17/2017    Excessive sleepiness    Hypertensive chronic kidney disease with stage 1 through stage 4 chronic kidney disease, or unspecified chronic kidney disease 02/22/2022    CKD stage 4 secondary to hypertension    Hypertensive chronic kidney disease with stage 1 through stage 4 chronic kidney disease, or unspecified chronic kidney disease 02/22/2022    CKD stage 4 secondary to hypertension    Hypertensive chronic kidney disease with stage 1 through stage 4 chronic kidney disease, or unspecified chronic kidney disease 02/22/2022    CKD stage 4 secondary to hypertension    Hypertensive chronic kidney disease with stage 1 through stage 4 chronic kidney disease, or unspecified chronic kidney disease 02/22/2022    CKD stage 4 secondary to hypertension    Hypertensive heart disease without heart failure 08/07/2015    LVH (left ventricular hypertrophy) due to " hypertensive disease    Hypoxemia 10/17/2022    Hypoxia    Olecranon bursitis, right elbow 03/22/2016    Olecranon bursitis of right elbow    Other chest pain 06/27/2016    Chest wall pain    Other forms of dyspnea 10/17/2022    Dyspnea on exertion    Pain in leg, unspecified 09/26/2017    Lower extremity pain    Pain in right leg 10/02/2019    Bilateral leg and foot pain    Pain in unspecified elbow 02/01/2016    Elbow pain    Personal history of diseases of the skin and subcutaneous tissue 08/04/2020    History of contact dermatitis    Personal history of diseases of the skin and subcutaneous tissue 02/14/2014    History of dermatitis    Personal history of other diseases of the circulatory system 01/21/2015    History of cardiac murmur    Personal history of other diseases of the circulatory system 08/31/2020    History of hypertension    Personal history of other diseases of the circulatory system 09/27/2021    History of complete atrioventricular block    Personal history of other diseases of the nervous system and sense organs 06/30/2021    History of acute conjunctivitis    Personal history of other diseases of the respiratory system 11/30/2022    History of pulmonary edema    Personal history of other endocrine, nutritional and metabolic disease 10/04/2019    History of obesity    Personal history of other endocrine, nutritional and metabolic disease 10/25/2016    History of hyperkalemia    Personal history of other infectious and parasitic diseases 05/23/2017    History of viral infection    Personal history of other specified conditions 02/17/2017    History of snoring    Personal history of other specified conditions 04/16/2020    History of dysuria    Personal history of other specified conditions 09/27/2021    History of syncope    Personal history of other specified conditions 09/26/2017    History of itching    Personal history of other specified conditions 05/11/2021    History of bradycardia     Personal history of pneumonia (recurrent) 11/13/2022    History of pneumonia    Prediabetes 02/22/2022    Prediabetes    Shortness of breath 10/17/2022    Shortness of breath at rest    Tinea unguium 12/16/2019    Mycotic toenails       Surgical History  Past Surgical History:   Procedure Laterality Date    CATARACT EXTRACTION  09/25/2018    Cataract Surgery    CT ANGIO CORONARY ART WITH HEARTFLOW IF SCORE >30%  11/10/2023    CT ANGIO CORONARY ART WITH HEARTFLOW IF SCORE >30% 11/10/2023 [facility] CT    OTHER SURGICAL HISTORY  09/27/2021    Pacemaker insertion        Social History  He reports that he quit smoking about 36 years ago. His smoking use included cigarettes. He has never used smokeless tobacco. He reports current alcohol use of about 1.0 standard drink of alcohol per week. He reports that he does not use drugs.    Family History  Family History   Problem Relation Name Age of Onset    Dementia Mother      Kidney failure Father          end stage        Allergies  Patient has no known allergies.    Review of Systems     Physical Exam     Last Recorded Vitals  Blood pressure (!) 105/44, pulse 62, resp. rate (!) 8, SpO2 99 %.    Relevant Results  Assessment/Plan   Principal Problem:    Cardiac arrest (CMS/HCC)    I spent  minutes in the professional and overall care of this patient.      Nikita Nair MD

## 2023-11-23 NOTE — PROGRESS NOTES
Pedro Arzate is a 73 y.o. male on day 0 of admission presenting with Cardiac arrest (CMS/Formerly [facility]).    Subjective   Mr. Arzate is a 73-year-old man with a history of ESRD on peritoneal dialysis, heart failure, many admissions as of late.  I saw him this past Tuesday, he had lower extremity edema.  I explained to him that he will have issues with heart failure if he is not careful with his sodium intake, we also added 4.25% exchanges to improve ultrafiltration.  He did it on Tuesday and Wednesday, was feeling better.  He was to get another cycler machine which did not show up.  He became short of breath, unfortunately suffered a cardiac arrest, CPR, epinephrine, ROSC.  He is currently on norepinephrine, epinephrine, starting vasopressin.  Has a left IJ triple-lumen catheter.      Objective       Physical Exam  Constitutional:    Intubated, sedated  HENT:      ETT tube in place  Eyes:        Pupils: Pupils are equal, round, and reactive to light.   Cardiovascular:   Tachycardia  Pulmonary:      Breath sounds: Clear anteriorly on the vent  Abdominal:      Comments: Soft, NT/ND, no masses, PD catheter in place and covered.  Genitourinary:     Comments: No whittington  Musculoskeletal:      Minimal edema  Skin:     General: Skin is warm and dry.   Neurological:   Intubated, sedated  Psychiatric:        Meds    Current Facility-Administered Medications:     EPINEPHrine (Adrenalin) 4 mg in dextrose 5% 250 mL (16 mcg/mL) infusion (premix), 0.01-1 mcg/kg/min, intravenous, Continuous, Nakita Bustos MD, Last Rate: 6.78 mL/hr at 11/23/23 1109, 0.02 mcg/kg/min at 11/23/23 1109    lidocaine-epinephrine (Xylocaine W/EPI) injection  - Omnicell Override Pull, , , ,     norepinephrine (Levophed) 8 mg in dextrose 5% 250 mL (0.032 mg/mL) infusion (premix), 0.01-0.5 mcg/kg/min, intravenous, Continuous, Jensen Fonseca PA-C    oxygen (O2) therapy, , inhalation, Continuous PRN - O2/gases, Nakita Bustos MD, 80 percent at 11/23/23 8214     vasopressin (Vasostrict) 0.2 unit/mL infusion, 0.03 Units/min, intravenous, Continuous, Jensen Fonseca PA-C   There are no discontinued medications.     Allergies  No Known Allergies     Last Recorded Vitals  Blood pressure (!) 91/47, pulse 50, resp. rate 21, SpO2 100 %.  Intake/Output last 3 Shifts:  No intake/output data recorded.    Last 24 hour Results  Results for orders placed or performed during the hospital encounter of 11/23/23 (from the past 24 hour(s))   BLOOD GAS ARTERIAL FULL PANEL   Result Value Ref Range    POCT pH, Arterial 7.16 (LL) 7.38 - 7.42 pH    POCT pCO2, Arterial 81 (HH) 38 - 42 mm Hg    POCT pO2, Arterial 123 (H) 85 - 95 mm Hg    POCT SO2, Arterial 98 94 - 100 %    POCT Oxy Hemoglobin, Arterial 97.0 94.0 - 98.0 %    POCT Hematocrit Calculated, Arterial 28.0 (L) 41.0 - 52.0 %    POCT Sodium, Arterial 143 136 - 145 mmol/L    POCT Potassium, Arterial 4.8 3.5 - 5.3 mmol/L    POCT Chloride, Arterial 100 98 - 107 mmol/L    POCT Ionized Calcium, Arterial 1.00 (L) 1.10 - 1.33 mmol/L    POCT Glucose, Arterial 227 (H) 74 - 99 mg/dL    POCT Lactate, Arterial 10.9 (HH) 0.4 - 2.0 mmol/L    POCT Base Excess, Arterial -0.8 -2.0 - 3.0 mmol/L    POCT HCO3 Calculated, Arterial 28.9 (H) 22.0 - 26.0 mmol/L    POCT Hemoglobin, Arterial 9.2 (L) 13.5 - 17.5 g/dL    POCT Anion Gap, Arterial 19 10 - 25 mmo/L    Patient Temperature 37.0 degrees Celsius    FiO2 100 %   CBC and Auto Differential   Result Value Ref Range    WBC 7.1 4.4 - 11.3 x10*3/uL    nRBC 1.1 (H) 0.0 - 0.0 /100 WBCs    RBC 3.03 (L) 4.50 - 5.90 x10*6/uL    Hemoglobin 8.8 (L) 13.5 - 17.5 g/dL    Hematocrit 30.0 (L) 41.0 - 52.0 %    MCV 99 80 - 100 fL    MCH 29.0 26.0 - 34.0 pg    MCHC 29.3 (L) 32.0 - 36.0 g/dL    RDW 16.6 (H) 11.5 - 14.5 %    Platelets 176 150 - 450 x10*3/uL    Neutrophils % 63.1 40.0 - 80.0 %    Immature Granulocytes %, Automated 2.1 (H) 0.0 - 0.9 %    Lymphocytes % 26.3 13.0 - 44.0 %    Monocytes % 5.4 2.0 - 10.0 %    Eosinophils  % 2.4 0.0 - 6.0 %    Basophils % 0.7 0.0 - 2.0 %    Neutrophils Absolute 4.47 1.60 - 5.50 x10*3/uL    Immature Granulocytes Absolute, Automated 0.15 0.00 - 0.50 x10*3/uL    Lymphocytes Absolute 1.86 0.80 - 3.00 x10*3/uL    Monocytes Absolute 0.38 0.05 - 0.80 x10*3/uL    Eosinophils Absolute 0.17 0.00 - 0.40 x10*3/uL    Basophils Absolute 0.05 0.00 - 0.10 x10*3/uL   Basic metabolic panel   Result Value Ref Range    Glucose 158 (H) 74 - 99 mg/dL    Sodium 140 136 - 145 mmol/L    Potassium 5.5 (H) 3.5 - 5.3 mmol/L    Chloride 97 (L) 98 - 107 mmol/L    Bicarbonate 22 21 - 32 mmol/L    Anion Gap 27 (H) 10 - 20 mmol/L    Urea Nitrogen 77 (H) 6 - 23 mg/dL    Creatinine 17.77 (H) 0.50 - 1.30 mg/dL    eGFR 2 (L) >60 mL/min/1.73m*2    Calcium 7.1 (L) 8.6 - 10.3 mg/dL   Magnesium   Result Value Ref Range    Magnesium 1.80 1.60 - 2.40 mg/dL   Phosphorus   Result Value Ref Range    Phosphorus 9.6 (H) 2.5 - 4.9 mg/dL   Hepatic function panel   Result Value Ref Range    Albumin 2.7 (L) 3.4 - 5.0 g/dL    Bilirubin, Total 0.3 0.0 - 1.2 mg/dL    Bilirubin, Direct 0.0 0.0 - 0.3 mg/dL    Alkaline Phosphatase 106 33 - 136 U/L     (H) 10 - 52 U/L     (H) 9 - 39 U/L    Total Protein 5.3 (L) 6.4 - 8.2 g/dL   Lactate   Result Value Ref Range    Lactate 7.3 (HH) 0.4 - 2.0 mmol/L   Troponin I, High Sensitivity   Result Value Ref Range    Troponin I, High Sensitivity 110 (HH) 0 - 20 ng/L   B-Type Natriuretic Peptide   Result Value Ref Range    BNP 1,067 (H) 0 - 99 pg/mL   Blood Gas Venous Full Panel   Result Value Ref Range    POCT pH, Venous 7.21 (LL) 7.33 - 7.43 pH    POCT pCO2, Venous 61 (H) 41 - 51 mm Hg    POCT pO2, Venous 48 (H) 35 - 45 mm Hg    POCT SO2, Venous 57 45 - 75 %    POCT Oxy Hemoglobin, Venous 56.3 45.0 - 75.0 %    POCT Hematocrit Calculated, Venous 29.0 (L) 41.0 - 52.0 %    POCT Sodium, Venous 136 136 - 145 mmol/L    POCT Potassium, Venous 5.8 (H) 3.5 - 5.3 mmol/L    POCT Chloride, Venous 100 98 - 107 mmol/L     POCT Ionized Calicum, Venous 1.00 (L) 1.10 - 1.33 mmol/L    POCT Glucose, Venous 176 (H) 74 - 99 mg/dL    POCT Lactate, Venous 8.9 (HH) 0.4 - 2.0 mmol/L    POCT Base Excess, Venous -3.9 (L) -2.0 - 3.0 mmol/L    POCT HCO3 Calculated, Venous 24.4 22.0 - 26.0 mmol/L    POCT Hemoglobin, Venous 9.8 (L) 13.5 - 17.5 g/dL    POCT Anion Gap, Venous 17.0 10.0 - 25.0 mmol/L    Patient Temperature 37.0 degrees Celsius    FiO2 21 %   Protime-INR   Result Value Ref Range    Protime 13.1 (H) 9.8 - 12.8 seconds    INR 1.2 (H) 0.9 - 1.1   D-dimer, VTE Exclusion   Result Value Ref Range    D-Dimer, Quantitative VTE Exclusion 3,685 (H) <=500 ng/mL FEU   Blood Gas Lactic Acid, Venous   Result Value Ref Range    POCT Lactate, Venous 6.5 (HH) 0.4 - 2.0 mmol/L   BLOOD GAS VENOUS FULL PANEL   Result Value Ref Range    POCT pH, Venous 7.25 (LL) 7.33 - 7.43 pH    POCT pCO2, Venous 56 (H) 41 - 51 mm Hg    POCT pO2, Venous 52 (H) 35 - 45 mm Hg    POCT SO2, Venous 73 45 - 75 %    POCT Oxy Hemoglobin, Venous 71.5 45.0 - 75.0 %    POCT Hematocrit Calculated, Venous 24.0 (L) 41.0 - 52.0 %    POCT Sodium, Venous 138 136 - 145 mmol/L    POCT Potassium, Venous 6.0 (H) 3.5 - 5.3 mmol/L    POCT Chloride, Venous 103 98 - 107 mmol/L    POCT Ionized Calicum, Venous 0.97 (L) 1.10 - 1.33 mmol/L    POCT Glucose, Venous 126 (H) 74 - 99 mg/dL    POCT Lactate, Venous 6.5 (HH) 0.4 - 2.0 mmol/L    POCT Base Excess, Venous -2.7 (L) -2.0 - 3.0 mmol/L    POCT HCO3 Calculated, Venous 24.6 22.0 - 26.0 mmol/L    POCT Hemoglobin, Venous 8.0 (L) 13.5 - 17.5 g/dL    POCT Anion Gap, Venous 16.0 10.0 - 25.0 mmol/L    Patient Temperature 37.0 degrees Celsius    FiO2 80 %        Imaging results  XR chest 1 view    Result Date: 11/23/2023  Interpreted By:  Shannon Thorne, STUDY: XR CHEST 1 VIEW;  11/23/2023 11:31 am   INDICATION: Signs/Symptoms:post CVC placement.   COMPARISON: 9:56 a.m. the same day   ACCESSION NUMBER(S): [id number]   ORDERING CLINICIAN: CHERIE HOOD    FINDINGS: Artifact from overlying monitoring leads. Interval placement of left jugular central line with tip overlying the distal brachiocephalic vein. ET tube remains in place with tip approximate 4.3 cm above the edgardo. NG tube remains in place extending into the upper abdomen, tip not included on the image. Bipolar left subclavian pacer wires remain in place. Hazy bilateral infiltrates again seen. No definite pleural effusion or pneumothorax. The cardiac silhouette is prominent without change.       Interval placement of left jugular central line as above. No pneumothorax. Persistent bilateral infiltrates/edema.   MACRO: None.   Signed by: Shannon Thorne 11/23/2023 11:43 AM Dictation workstation:   [id number]    XR chest 1 view    Result Date: 11/23/2023  Interpreted By:  Roni Bragg, STUDY: XR CHEST 1 VIEW;  11/23/2023 10:01 am   INDICATION: Signs/Symptoms:post arrest.   COMPARISON: 11/08/2023   ACCESSION NUMBER(S): [id number]   ORDERING CLINICIAN: CHERIE HOOD   FINDINGS: AP portable view of the chest is obtained.  Limited exam due to portable nature. Magnified cardiac silhouette. Endotracheal tube terminates above the edgardo. Cardiac pacer. Perihilar and diffuse ground-glass infiltrates, worse since the prior exam.       Cardiomegaly with diffuse large and dense infiltrates bilaterally especially in the perihilar lungs.   MACRO: None   Signed by: Roni Bragg 11/23/2023 10:22 AM Dictation workstation:   [id number]    ECG 12 lead    Result Date: 11/17/2023  Atrial-sensed ventricular-paced rhythm Abnormal ECG When compared with ECG of 08-NOV-2023 10:52, Vent. rate has decreased BY   5 BPM Confirmed by Luis Armando Warren ([id number]) on 11/17/2023 4:06:59 PM    CT angio coronary art with heartflow if score >30%    Result Date: 11/10/2023  Interpreted By:  Michelle Espitia, STUDY: CT ANGIO CORONARY ART WITH HEARTFLOW IF SCORE >30%;  11/10/2023 4:55 pm   INDICATION: Signs/Symptoms:Dyspnea, concern for UA.  .   There is need to define coronary anatomy.   COMPARISON: None.   ACCESSION NUMBER(S): [id number]   ORDERING CLINICIAN: ISAK CARRILLO   TECHNIQUE: Using multi-detector CT technology,  axial, sequential imaging with prospective gating was performed of the chest following the intravenous administration of contrast material.  A low-osmolar contrast agent was used ( 80 ml of Optiray 350).   The patient was premedicated with   mg i.v metoprolol and 0.4 mg sublingual nitroglycerin for heart rate control and coronary dilation, respectively.   For optimization of anatomic evaluation, multiplanar reconstruction, maximum intensity projections, and advanced 3-D off-line postprocessing were performed on a dedicated stand-alone workstation under the direct supervision of the interpreting physician.   CT Dose-Length Product (DLP):    mGy/cm CT Dose Reduction Employed: Yes (Prospective triggering, iterative reconstruction)   FINDINGS: POTENTIAL STUDY LIMITATIONS:  None.   CORONARY ARTERIES:   CORONARY ANATOMY: There is normal origin of the coronary arteries.   LEFT MAIN CORONARY ARTERY: The left main is normal sized vessel that  trifurcates into LAD,circumflex artery and ramus intermedius. Small focus of calcification in the left main coronary artery causing no significant stenosis.   LEFT ANTERIOR DESCENDING ARTERY: The LAD is a normal size vessel that  wraps around the apex. It gives rise to  4 acute diagonal branches. Scattered calcified plaques in the proximal and mid LAD causing less than 50% stenosis. There is a calcified plaque in the proximal D1 causing less than 50% stenosis.   LEFT CIRCUMFLEX ARTERY: The LCX is a normal size vessel, which is  non-dominant. It gives rise to  2 obtuse marginal branches. There is no significant atherosclerotic change or stenotic disease.   RAMUS INTERMEDIUS: The ramus is a small sized vessel. There is no significant atherosclerotic change or stenotic disease.   RIGHT CORONARY ARTERY: The  RCA is a normal size vessel, which is  dominant . It gives rise to a  conus branch,  balbir branch, and  2 acute marginal branches.  In its distal segment it bifurcates into the PDA and PV branch. Extensive atherosclerotic calcification throughout the proximal, mid and distal RC A. Assessment for degree of stenosis is not possible due to motion artifact and beam hardening artifact caused by pacer/defibrillator wires..   CARDIAC CHAMBERS: The cardiac chambers demonstrate normal atrioventricular and ventriculoarterial concordance, and systemic and pulmonary venous return.   Left ventricle and left atrial enlargement   AORTIC VALVE: The aortic valve is  trileaflet in morphology.  Calcification of the aortic valve. Correlation for aortic valve stenosis.   MITRAL VALVE: There is moderate to severe mitral annulus/mitral valve calcification.   THORACIC AORTA: The visualized thoracic aorta is normal in course, caliber, and contour. There is no acute aortic pathology, such as dissection, intramural hematoma, or contained rupture. The aortic arch is not included on this examination.   PULMONARY ARTERY: Pulmonary artery is slightly dilated measuring 3.2 cm.   PERICARDIUM: There is no pericardial effusion of thickening.   CHEST: The chest wall is normal. No significant lymphadenopathy or mass is seen in limited images of the mediastinum. There is moderate bilateral pleural effusion with associated bibasilar atelectasis. Area of ground-glass opacity in the right upper lobe which might be due to atelectasis/edema or infectious infiltrate. No pleural effusion or pneumothorax.   UPPER ABDOMEN: Moderate amount of free fluid in the visualized portion of the upper abdomen. Small amount of free air in the upper abdomen.       1. Limited study due to motion artifact. 2. Within the limitations scattered calcified plaques are identified in proximal and mid LAD causing less than 50% stenosis. 3. No significant atherosclerotic calcification  or stenosis in left main and CX. 4. Unable to assess RCA due to motion artifact and beam hardening artifact caused by pacer/defibrillator wires. 5. Left atrial and left ventricular enlargement. 6. Calcification of the aortic valve. Correlation with aortic valve stenosis. 7. Moderate sized bilateral pleural effusion with associated atelectasis. Mild interstitial pulmonary edema. An area of ground-glass airspace opacity in the right upper lobe which might be due to edema or developing infiltrate. Correlate clinically and with concern for infection. 8. Moderate amount of free fluid in the upper abdomen with small amount of free air which may be due to peritoneal dialysis. Correlate clinically and correlate with concern for acute abdominal pathology.     MACRO: Findings were relayed to the ordering nurse practitioner Ms. Uribeine at 5:32 p.m., 11/10/2023 via [email or web address] secure chat.   Signed by: Michelle Truong 11/10/2023 5:41 PM Dictation workstation:   [id number]    XR chest 2 views    Result Date: 11/8/2023  Interpreted By:  Vinny Mckeon, STUDY: XR CHEST 2 VIEWS;  11/8/2023 12:57 pm   INDICATION: Signs/Symptoms:SOB.   COMPARISON: 04/02/2023   ACCESSION NUMBER(S): [id number]   ORDERING CLINICIAN: ROSA CANNON   FINDINGS: Pacemaker. Interstitial prominence with hazy opacification of the lungs. No apparent pleural effusion. Cardiomegaly. Aortic atherosclerosis.       Hazy opacification of the lungs perhaps related to edema or pneumonitis.     Signed by: Vinny Mckeon 11/8/2023 1:17 PM Dictation workstation:   [id number]       Assessment and Plan  Mr. Arzate is a 73-year-old man with a history of ESRD on peritoneal dialysis, heart failure, many admissions as of late.  I saw him this past Tuesday, he had lower extremity edema.  I explained to him that he will have issues with heart failure if he is not careful with his sodium intake, we also added 4.25% exchanges to improve ultrafiltration.  He did it on Tuesday and  Wednesday, was feeling better.  He was to get another cycler machine which did not show up.  He became short of breath, unfortunately suffered a cardiac arrest, CPR, epinephrine, ROSC.  He is currently on norepinephrine, epinephrine, starting vasopressin.  Has a left IJ triple-lumen catheter.    I am afraid we will have no choice but to place a temporary hemodialysis line and start CRRT, as much for volume as anything else.  He will be getting many drips, he is already fluid overload, this is increasing myocardial oxygen demand.     55 minutes in the care of Mr. Arzate.  I spoke with his wife and grandson at the bedside.    Miller Mccurdy MD

## 2023-11-23 NOTE — ED TRIAGE NOTES
Patient presents to ED via car with wife driving with c/o cardiac arrest. Pt was talking with wife as they were driving in to the ED complaining of SOB. Wife came in to get wheelchair and pt was unable to get out of car, she came back into ED to get help and when she returned with help he was slumped over unresponsive.      Patient unresponsive w/o palpable pulse, CPR started as pt was brought to room 2.

## 2023-11-23 NOTE — PROCEDURES
Central Line    Date/Time: 11/23/2023 3:24 PM    Performed by: Jensen Fonseca PA-C  Authorized by: Jensen Fonseca PA-C    Consent:     Consent obtained:  Verbal    Consent given by:  Spouse    Risks, benefits, and alternatives were discussed: yes      Risks discussed:  Arterial puncture, incorrect placement, bleeding, infection and pneumothorax  Universal protocol:     Procedure explained and questions answered to patient or proxy's satisfaction: yes      Patient identity confirmed:  Arm band  Pre-procedure details:     Indication(s): central venous access      Indication(s) comment:  Hemodialysis catheter    Hand hygiene: Hand hygiene performed prior to insertion      Sterile barrier technique: All elements of maximal sterile technique followed      Skin preparation:  Chlorhexidine    Skin preparation agent: Skin preparation agent completely dried prior to procedure    Sedation:     Sedation type:  Moderate sedation  Anesthesia:     Anesthesia method:  None  Procedure details:     Location:  R internal jugular    Patient position:  Supine    Procedural supplies:  Triple lumen    Catheter size:  13 Fr    Ultrasound guidance: yes      Ultrasound guidance timing: prior to insertion and real time      Number of attempts:  1    Successful placement: yes    Post-procedure details:     Post-procedure:  Dressing applied and line sutured    Assessment:  Blood return through all ports    Procedure completion:  Tolerated    Complications:  Cxr pending

## 2023-11-23 NOTE — PROCEDURES
The patient was prepped and draped in the usual sterile manner using chlorhexidine scrub. 1% lidocaine was used to numb the region. The  Right Radial was  Visualized Utilizing Ultrasound and successfully cannulated on the first pass. Pulsatile, arterial blood was visualized and the artery was then threaded using the modified seldinger technique and a catheter was sutured into place. Good wave-form was obtained. The patient tolerated the procedure well without any immediate complications. The area was cleaned and Tegaderm was applied.

## 2023-11-24 NOTE — PROGRESS NOTES
HD # 2 for this 73 year old male who was admitted from the ED to the ICU in extremis after a cardiac arrest with a prolonged period of CPR.      This 74 YO male  was transported to  the ER in a private vehicle with complaints of shortness of breath after missing his regularly scheduled PD session secondary to not having a cycler machine, His last dialysis was this thought to be Tuesday. He has MMP including:  hypertension, ESRD on home peritoneal dialysis, CHF, pacemaker insertion, and multiple recent hospitalizations for CHF exacerbations. Reports or unclear however it appears that he became acutely short of breath this morning was transported to the ED in a private car, however could not walk to the triage area. His Wife went to triage to obtain help and when help from triage arrived, he was discovered in full arrest when staff from the ED arrived at the car. Confirmation of arrest after transport to the ED Care (see timeline no CODE documentation in chart) appears to reveal that the code started at 0910 he was in the ED at 0915 (notation for arrival) the code ended at 1118 hrs. per report . During the code after documentation of asystole ACLS was started initial rhythm reported as ASYSTOLE. He was intubated but this was noted to require two ETT exchanges secondary to cuff rupture. He remained hypotensive after ROSC and required vasopressor agents (two). He was transferred to the ICU with low systolic BP's with a newly placed central line in his left IJ.       PMH   Essential (primary) hypertension         07/09/2015  Hyperglycemia, unspecified    06/02/2017  Hypersomnia, unspecified       02/17/2017  CKD stage 4 secondary to hypertension  Hypertensive heart disease without heart failure       08/07/2015  LVH (left ventricular hypertrophy) due to hypertensive disease  Hypoxemia 10/17/2022  Olecranon bursitis, right elbow           03/22/2016  Dyspnea on exertion  Bilateral leg and foot pain  Complete  atrioventricular block  Pulmonary edema  Obesity  Syncope  Bradycardia  Pneumonia     Surgical History   Cataract extraction                  09/25/2018  Ct angio coronary art with heart flow if score >30% 11/10/2023 [facility] CT  Pacemaker insertion unknown date for “complete heart block s/p PPM ([employer]) as noted in Cardiology note of 11/8/2023      SH reformed smoker quit 1987 social alcohol use     Sedation Propofol which had been used to suppress generalized myoclonus held. He remains unresponsive  to voice, tactile , noxious stimuli ,  2 mm pupils NR to  bright light ,  dolls eyes, NR to voice, weak withdrawal noted yesterday now absent GCS 3 = 1+ 1+ 1. Symmetric chest expansion on ventilator , Not tachycardic has been weaned of vasopressors , ABD PD cath in place generalized whole body myoclonus S/P arrest of unspecified length Plan ongoing resuscitation  Await neurology input I have ordered an MRI that coupled with neurology consult to assist in  prognosis.  Overall clinical status    when obtainable high suspicion for anoxic encephalopathy     Assessment/Plan:    Neuro:  myoclonus non responsive to voice tactile noxious stimuli   A-F bundle    CV: Chronic CHF pacemaker dependant     Pulm: Respiratory failure Acute with hypoxemia  BPH with IS/flutter/OOB    Renal: ESRD  Trend UOP and renal indices; replete lytes PRN     GI: PCM Obesity   Proph with PPI    ID/rheum: n/a  Follow cx data    Endo: IDDM2 with nephropathy      Heme:   DVT proph with scd and heparin sc    Lines/tubes/restraints:     ART Line   Dialysis Cath   Central Line  [place]   Currently needed as he remains Desperately critically ill     Exam:  Sedation Propofol which had been used to suppress generalized myoclonus held. He remains unresponsive  to voice, tactile , noxious stimuli ,  2 mm pupils NR to  bright light ,  dolls eyes, NR to voice, weak withdrawal noted yesterday now absent GCS 3 = 1+ 1+ 1. Symmetric chest expansion on ventilator , Not  "tachycardic has been weaned of vasopressors , ABD PD cath in place generalized whole body myoclonus S/P arrest of unspecified length Plan ongoing resuscitation  Await neurology input I have ordered an MRI that coupled with neurology consult to assist in  prognosis.  Overall clinical status    when obtainable high suspicion for anoxic encephalopathy         This critically ill patient continues to be at-risk for clinically significant deterioration / failure due to the above mentioned dysfunctional, unstable organ systems.  I have personally identified and managed all complex critical care issues to prevent aforementioned clinical deterioration.  Critical care time is spent at bedside and/or the immediate area and has included, but is not limited to, the review of diagnostic tests, labs, radiographs, serial assessments of hemodynamics, respiratory status, ventilatory management, and family updates.  Time spent in procedures and teaching are reported separately. CF    CRITICAL CARE TIME: minutes 60     LABS:  CMP:  Results from last 7 days   Lab Units 11/24/23  0110 11/23/23  1825 11/23/23  1541 11/23/23  0946   SODIUM mmol/L 137 140  --  140   POTASSIUM mmol/L 4.3 4.6  --  5.5*   CHLORIDE mmol/L 99 98  --  97*   CO2 mmol/L 24 26  --  22   ANION GAP mmol/L 18 21*  --  27*   BUN mg/dL 49* 73* 81* 77*   CREATININE mg/dL 10.25* 15.29* 18.05* 17.77*   EGFR mL/min/1.73m*2 5* 3* 2* 2*   MAGNESIUM mg/dL  --   --   --  1.80   ALBUMIN g/dL  --   --   --  2.7*   ALT U/L  --   --   --  111*   AST U/L  --   --   --  181*   BILIRUBIN TOTAL mg/dL  --   --   --  0.3     CBC:  Results from last 7 days   Lab Units 11/24/23  0441 11/24/23  0105 11/23/23  0946   WBC AUTO x10*3/uL 8.4  --  7.1   HEMOGLOBIN g/dL 8.3*  --  8.8*   HEMATOCRIT % 26.7*  --  30.0*   PLATELETS AUTO x10*3/uL 148* 159 176   MCV fL 93  --  99     COAG:   Results from last 7 days   Lab Units 11/23/23  0953   INR  1.2*     ABO: No results found for: \"ABO\"  HEME/ENDO: "     CARDIAC:   Results from last 7 days   Lab Units 11/23/23  0946   TROPHS ng/L 110*   BNP pg/mL 1,067*

## 2023-11-24 NOTE — CARE PLAN
The patient's goals for the shift include      The clinical goals for the shift include Pt will remain hemodynamically stable off of vasopressors through end of shift    Over the shift, the patient did not make progress toward the following goals. Barriers to progression include . Recommendations to address these barriers include .

## 2023-11-24 NOTE — PROGRESS NOTES
I had a lengthy discussion at bedside with the wife of Pedro Arzate. She confirmed history of his becoming  short of breath as well as that she drove him to the ED left him in their vehicle to get a wheelchair only to find him apneic upon her return then later arrival of triage staff confirming the lengthy period of time of no cerebral flow. He does not have a documented CODE status however she believes if the suspicions of an anoxic brain encephalopathy are true he would not want repeat efforts at CPR or heroic measures should he not have any possible quality of life.  Nikita Nair MD    Discussed results of neurologic consult with wife, niece as well as Dr Tobar.  Historical events surrounding cardiac arrest again reviewed clarified. Appreciate neurology input.  Will obtain ( ordered) Stat EEG to rule out presence of seizure activity to explain non responsive state.

## 2023-11-24 NOTE — NURSING NOTE
0850 - Dr Nair notified that pt remains unresponsive off of propofol but myoclonic jerking has increased, without stimulation, causing vent to alarm. Order to infuse propofol at lowest rate to diminish myoclonic movement    1615 - Dr Nair notified re: call from radiology that MRI cannot be done here at [facility] due to pt's PPM and [place] [facility] [place]    1830 - Dr Nair notified re: message from EEG tech that they are unable to do STAT EEG today

## 2023-11-24 NOTE — CARE PLAN
N/aThe patient's goals for the shift include      The clinical goals for the shift include Pt will remain hemodynamically stable off of vasopressors through end of shift  The pt remains off of vasopressors    Over the shift, the patient did not make progress toward the following goals. Barriers to progression include pt remains npo. Recommendations to address these barriers include reconsider starting tube feeding when medically able.    Problem: Skin  Goal: Promote/optimize nutrition  Outcome: Not Progressing

## 2023-11-24 NOTE — PROGRESS NOTES
11/24/23 8511   Discharge Planning   Living Arrangements Spouse/significant other   Support Systems Spouse/significant other   Assistance Needed Independent   Type of Residence Private residence   Number of Stairs to Enter Residence 0   Number of Stairs Within Residence 0   Do you have animals or pets at home? No   Who is requesting discharge planning? Provider   Home or Post Acute Services Post acute facilities (Rehab/SNF/etc)   Type of Post Acute Facility Services Skilled nursing   Patient expects to be discharged to: TBD   Does the patient need discharge transport arranged? Yes   RoundTrip coordination needed? Yes   Has discharge transport been arranged? No   Financial Resource Strain   How hard is it for you to pay for the very basics like food, housing, medical care, and heating? Not hard   Housing Stability   In the last 12 months, was there a time when you were not able to pay the mortgage or rent on time? N   In the last 12 months, how many places have you lived? 1   In the last 12 months, was there a time when you did not have a steady place to sleep or slept in a shelter (including now)? N   Transportation Needs   In the past 12 months, has lack of transportation kept you from medical appointments or from getting medications? no   In the past 12 months, has lack of transportation kept you from meetings, work, or from getting things needed for daily living? No   Patient Choice   Provider Choice list and CMS website (https://medicare.gov/care-compare#search) for post-acute Quality and Resource Measure Data were provided and reviewed with: Family   Patient / Family choosing to utilize agency / facility established prior to hospitalization No           Patient is in ICU, with cardiac arrest. He is intubated. Patient was in the hospital recently and went home with his wife. He is PD patient. They live in the house. He was independent with his care. His PCP is Dr Kiah Edmonds. Pharmacy is mail order. Unable  to determain discharge plan. Will follow for discharge needs.

## 2023-11-24 NOTE — PROCEDURES
Seen on CVVH.  He is off of pressors.  Labs look good, the key is ultrafiltration now.  This was a heart failure event.  The question is how long it took to get to him, his neurologic status is in question which is very upsetting.  I will talk to his wife today.

## 2023-11-25 NOTE — PROGRESS NOTES
HD # 3 for this 73 year old male who was admitted from the ED to the ICU in extremis after a cardiac arrest with a prolonged period of CPR.      This 74 YO male  was transported to  the ER in a private vehicle with complaints of shortness of breath after missing his regularly scheduled PD session secondary to not having a cycler machine, His last dialysis was this thought to be Tuesday. He has MMP including:  hypertension, ESRD on home peritoneal dialysis, CHF, pacemaker insertion, and multiple recent hospitalizations for CHF exacerbations. Reports or unclear however it appears that he became acutely short of breath the morning of admission and was transported to the ED in a private car, however could not walk to the triage area. He arrested in the parking lot Confirmation of arrest after transport to the ED Care (see timeline no CODE documentation in chart) appears to reveal that the code started at 0910 he was in the ED at 0915 (notation for arrival) the code ended at 1118 hrs. per report . During the code after documentation of asystole ACLS was started initial rhythm reported as ASYSTOLE. He was intubated but this was noted to require two ETT exchanges secondary to cuff rupture. He remained hypotensive after ROSC and required vasopressor agents (two). He was transferred to the ICU with low systolic BP's with a newly placed central line in his left IJ.  He remains ventilated unresponsive to verbal tactile or noxious stimuli.      PMH   Essential (primary) hypertension         07/09/2015  Hyperglycemia, unspecified    06/02/2017  Hypersomnia, unspecified       02/17/2017  CKD stage 4 secondary to hypertension  Hypertensive heart disease without heart failure       08/07/2015  LVH (left ventricular hypertrophy) due to hypertensive disease  Hypoxemia 10/17/2022  Olecranon bursitis, right elbow           03/22/2016  Dyspnea on exertion  Bilateral leg and foot pain  Complete atrioventricular block  Pulmonary  edema  Obesity  Syncope  Bradycardia  Pneumonia     Surgical History   Cataract extraction                  09/25/2018  Ct angio coronary art with heart flow if score >30% 11/10/2023 [facility] CT  Pacemaker insertion unknown date for “complete heart block s/p PPM ([employer]) as noted in Cardiology note of 11/8/2023      SH reformed smoker quit 1987 social alcohol use     He remains ventilated unresponsive to verbal tactile or noxious stimuli.  Sedation with Propofol to suppress whole body myoclonus reduced in dosage. Pupils 3 mm sluggishly reactive however now with downward deviation. GCS 3 = 1+ 1+ 1. Symmetric chest expansion on ventilator, Not tachycardic has been weaned of vasopressors, The ventilator was synchronized to SIMV he is breathing above the vent settings ABD PD cath in place generalized whole body myoclonus   IMP S/P arrest of unspecified length grave neurologic signs. Awaiting Stat EEG ordered yesterday Await additional neurology input. Pacemaker details remain unclear it is not to my review of notes a AICD but a simple pacemaker for heart block. Overall clinical status high suspicion for devastating anoxic encephalopathy   LABS:  CMP:  Results from last 7 days   Lab Units 11/25/23  0108 11/24/23  0110 11/23/23  1825 11/23/23  1541 11/23/23  0946   SODIUM mmol/L 137 137 140  --  140   POTASSIUM mmol/L 3.8 4.3 4.6  --  5.5*   CHLORIDE mmol/L 99 99 98  --  97*   CO2 mmol/L 30 24 26  --  22   ANION GAP mmol/L 12 18 21*  --  27*   BUN mg/dL 26* 49* 73* 81* 77*   CREATININE mg/dL 4.49* 10.25* 15.29* 18.05* 17.77*   EGFR mL/min/1.73m*2 13* 5* 3* 2* 2*   MAGNESIUM mg/dL  --   --   --   --  1.80   ALBUMIN g/dL  --   --   --   --  2.7*   ALT U/L  --   --   --   --  111*   AST U/L  --   --   --   --  181*   BILIRUBIN TOTAL mg/dL  --   --   --   --  0.3     CBC:  Results from last 7 days   Lab Units 11/25/23  0108 11/24/23  0441 11/24/23  0105 11/23/23  0946   WBC AUTO x10*3/uL 6.7 8.4  --  7.1   HEMOGLOBIN g/dL 8.2*  "8.3*  --  8.8*   HEMATOCRIT % 25.6* 26.7*  --  30.0*   PLATELETS AUTO x10*3/uL 132* 148* 159 176   MCV fL 91 93  --  99     COAG:   Results from last 7 days   Lab Units 11/23/23  0953   INR  1.2*     ABO: No results found for: \"ABO\"  HEME/ENDO:     CARDIAC:   Results from last 7 days   Lab Units 11/23/23  0946   TROPHS ng/L 110*   BNP pg/mL 1,067*        Assessment/Plan:  He remains ventilated unresponsive to verbal tactile or noxious stimuli.  Sedation with Propofol to suppress whole body myoclonus reduced in dosage. Pupils 3 mm sluggishly reactive however now with downward deviation. GCS 3 = 1+ 1+ 1. Symmetric chest expansion on ventilator, Not tachycardic has been weaned of vasopressors, The ventilator was synchronized to SIMV he is breathing above the vent settings ABD PD cath in place generalized whole body myoclonus   IMP S/P arrest of unspecified length grave neurologic signs. Awaiting Stat EEG ordered yesterday Await additional neurology input. Pacemaker details remain unclear it is not to my review of notes a AICD but a simple pacemaker for heart block his family is looking for wallet card to assist in testing MRI safety of this device . Overall clinical status high suspicion for devastating anoxic encephalopathy Prognosis for meaningful recovery grim       This critically ill patient continues to be at-risk for clinically significant deterioration / failure due to the above mentioned dysfunctional, unstable organ systems.  I have personally identified and managed all complex critical care issues to prevent aforementioned clinical deterioration.  Critical care time is spent at bedside and/or the immediate area and has included, but is not limited to, the review of diagnostic tests, labs, radiographs, serial assessments of hemodynamics, respiratory status, ventilatory management, and family updates.  Time spent in procedures and teaching are reported separately. CF    CRITICAL CARE TIME: minutes 45 " minutes    Nikita Nair MD

## 2023-11-25 NOTE — PROGRESS NOTES
"Obtained information related to Pacemaker Mr Arzate has an [employer] pacemaker [id number] Serial number [id number] implanted Sept 13, 2021  a dual chamber with RV lead 2088TC/58  serial number [id number] and RA lead 2088TC/52  Serial Number [id number]. I have contacted the [employer] rep via their company switchboard  and am awaiting a return call from Isreal Pierson (representative) a time for interrogation of the Pacemaker and then obtaining an MRI at [facility] as I have reviewed this information with radiologist covering MRI's on call Milena Cano MD earlier today it is anticipated that after the MRI is obtained an EEG will be applied EEG tech was contacted via nursing supervisor leandra to facilitate a STAT EEG.      I spoke with Olimpia from [employer]   who relates the last device check states the Pacer was  DDD 50. She needs 20 min lead time to arrive at [facility]. Will try to coordinate MRI  performance and lead time to allow Olimpia to arrive interrogate the pacer set it to MRI safe mode to accomplish the study  and post study return it to DDD50 . Reported that [facility] does not have a \"Transmit  Coil\" to allow performance of  an MRI on a patient with a cardiac pacemaker.  Called Olimpia informed her of same.       Front and back view of Mr Arzate's Pacemaker ID card         Nikita Nair MD          "

## 2023-11-25 NOTE — PROCEDURES
Seen on CVVH.  He is running well, TMP N pressure drop are not an issue.  But we are no longer ultrafiltrating.  In approximately 2 hours, when the current bags run out, we will stop CVVH.  No pressors now.  But am afraid he does not have neurologic function as far as we can see.  Neurology is on the case.

## 2023-11-25 NOTE — CARE PLAN
Problem: Skin  Goal: Promote/optimize nutrition  Outcome: Not Progressing  Flowsheets (Taken 11/24/2023 1951)  Promote/optimize nutrition: Discuss with provider if NPO > 2 days     Problem: Skin  Goal: Prevent/minimize sheer/friction injuries  Outcome: Progressing  Flowsheets (Taken 11/24/2023 1949)  Prevent/minimize sheer/friction injuries:   Complete micro-shifts as needed if patient unable. Adjust patient position to relieve pressure points, not a full turn   HOB 30 degrees or less   Turn/reposition every 2 hours/use positioning/transfer devices   Use pull sheet  Goal: Promote skin healing  Outcome: Progressing  Flowsheets (Taken 11/24/2023 1949)  Promote skin healing:   Assess skin/pad under line(s)/device(s)   Turn/reposition every 2 hours/use positioning/transfer devices   Protective dressings over bony prominences   Rotate device position/do not position patient on device  Note: Skin intact     Problem: Respiratory  Goal: Patent airway maintained this shift  Outcome: Progressing  Flowsheets (Taken 11/24/2023 1949)  Patent airway maintained this shift: Maintain ET tube tube position  Goal: Wean oxygen to maintain O2 saturation per order/standard this shift  Outcome: Progressing  Flowsheets (Taken 11/24/2023 1949)  Wean oxygen to maintain O2 saturation per order/standard this shift: Encourage activity/mobility     Problem: ESRD: Dialysis, CAPD  Goal: Follows dialysis treatment plan  Outcome: Progressing  Flowsheets (Taken 11/24/2023 1949)  Follows dialysis treatment plan:   Assess site/manage complications   Monitor drug/nephrotic toxicity   Monitor for infection     Problem: Safety - Adult  Goal: Free from fall injury  Outcome: Progressing  Flowsheets (Taken 11/24/2023 1949)  Free from fall injury: Instruct family/caregiver on patient safety     Problem: Chronic Conditions and Co-morbidities  Goal: Patient's chronic conditions and co-morbidity symptoms are monitored and maintained or improved  Outcome:  Progressing  Flowsheets (Taken 11/24/2023 1949)  Care Plan - Patient's Chronic Conditions and Co-Morbidity Symptoms are Monitored and Maintained or Improved:   Monitor and assess patient's chronic conditions and comorbid symptoms for stability, deterioration, or improvement   Collaborate with multidisciplinary team to address chronic and comorbid conditions and prevent exacerbation or deterioration     Problem: Mechanical Ventilation  Goal: Oral health is maintained or improved  Outcome: Progressing  Flowsheets (Taken 11/24/2023 1949)  Oral Health is Maintained or Improved:   Assess and monitor condition of lips and mouth and perform oral care per hospital policy   Perform oral care with an oral swab   Apply water-based moisturizer to lips   Suction oral pharynx  Goal: ET tube will be managed safely  Outcome: Progressing  Flowsheets (Taken 11/24/2023 1949)  Endotracheal Tube Will Be Managed Safely:   Assess and monitor insertion depth at lip/nare line   Utilize ETT securing device and change as necessary to ensure ETT is properly secured to patient   Support ventilator tubing to avoid pressure from drag of tubing   The patient's goals for the shift include      The clinical goals for the shift include Pt will tolerated fluid removal through CRRT    Over the shift, the patient did not make progress toward the following goals. Barriers to progression include Lack of enteral nutrition. Recommendations to address these barriers include discuss with provider goals for nutrition.    Problem: Skin  Goal: Promote/optimize nutrition  Outcome: Not Progressing  Flowsheets (Taken 11/24/2023 1951)  Promote/optimize nutrition: Discuss with provider if NPO > 2 days

## 2023-11-26 NOTE — CARE PLAN
The patient's goals for the shift include      The clinical goals for the shift include pt'd tremors will be medically managed by end of shift    Over the shift, the patient did not make progress toward the following goals. Barriers to progression include   Problem: Skin  Goal: Prevent/minimize sheer/friction injuries  Outcome: Progressing  Goal: Promote/optimize nutrition  Outcome: Progressing  Goal: Promote skin healing  Outcome: Progressing     Problem: Respiratory  Goal: Patent airway maintained this shift  Outcome: Progressing  Goal: Tolerate mechanical ventilation evidenced by VS/agitation level this shift  Outcome: Progressing  Goal: Tolerate pulmonary toileting this shift  Outcome: Progressing  Goal: Wean oxygen to maintain O2 saturation per order/standard this shift  Outcome: Progressing     Problem: ESRD: Dialysis, CAPD  Goal: Electrolytes restored to baseline  Outcome: Progressing  Goal: Follows dialysis treatment plan  Outcome: Progressing  Goal: Follows fluid restrictions  Outcome: Progressing  Goal: Participates in bowel regimen (CAPD)  Outcome: Progressing  Goal: Reports no abdominal pain/distension  Outcome: Progressing  Goal: Reports no shortness of breath  Outcome: Progressing     Problem: Chronic Conditions and Co-morbidities  Goal: Patient's chronic conditions and co-morbidity symptoms are monitored and maintained or improved  Outcome: Progressing     Problem: Knowledge Deficit  Goal: Patient/family/caregiver demonstrates understanding of disease process, treatment plan, medications, and discharge instructions  Outcome: Progressing

## 2023-11-26 NOTE — CARE PLAN
Problem: Skin  Goal: Promote/optimize nutrition  Outcome: Not Progressing  Flowsheets (Taken 11/24/2023 1951)  Promote/optimize nutrition: Discuss with provider if NPO > 2 days  Note: Pt still npo   The patient's goals for the shift include      The clinical goals for the shift include pt'd tremors will be medically managed by end of shift    Over the shift, the patient did not make progress toward the following goals. Barriers to progression include lack of enteral feeding. Recommendations to address these barriers include consulting nutritional dietician  Problem: Skin  Goal: Prevent/minimize sheer/friction injuries  Outcome: Progressing  Flowsheets (Taken 11/25/2023 1011 by Shira Fernandez RN)  Prevent/minimize sheer/friction injuries:   Complete micro-shifts as needed if patient unable. Adjust patient position to relieve pressure points, not a full turn   HOB 30 degrees or less   Increase activity/out of bed for meals   Turn/reposition every 2 hours/use positioning/transfer devices   Use pull sheet   Utilize specialty bed per algorithm     Problem: Respiratory  Goal: Patent airway maintained this shift  Outcome: Progressing  Flowsheets (Taken 11/24/2023 1949)  Patent airway maintained this shift: Maintain ET tube tube position  Goal: Tolerate mechanical ventilation evidenced by VS/agitation level this shift  Outcome: Progressing  Flowsheets (Taken 11/26/2023 0601)  Tolerate mechanical ventilation evidenced by VS/agitation level this shift:   Maintain ET tube tube position   Mechanical ventilation     Problem: ESRD: Dialysis, CAPD  Goal: Electrolytes restored to baseline  Outcome: Progressing  Flowsheets (Taken 11/26/2023 0000)  Electolytes restored to baseline:   Monitor electrolyte/acid base imbalance   Monitor I&O, weight, dietary intake     Problem: Safety - Adult  Goal: Free from fall injury  Outcome: Progressing   .    Problem: Skin  Goal: Promote/optimize nutrition  Outcome: Not Progressing  Flowsheets  (Taken 11/24/2023 1951)  Promote/optimize nutrition: Discuss with provider if NPO > 2 days  Note: Pt still npo

## 2023-11-26 NOTE — PROGRESS NOTES
Pedro Arzate is a 73 y.o. male on day 3 of admission presenting with Cardiac arrest (CMS/[facility]).    Subjective   Mr. Arzate is a 73-year-old man with a history of ESRD on peritoneal dialysis, heart failure, many admissions as of late.  I saw him this past Tuesday, he had lower extremity edema.  I explained to him that he will have issues with heart failure if he is not careful with his sodium intake, we also added 4.25% exchanges to improve ultrafiltration.  He did it on Tuesday and Wednesday, was feeling better.  He was to get another cycler machine which did not show up.  He became short of breath, unfortunately suffered a cardiac arrest, CPR, epinephrine, ROSC.      We started CRRT, as much for volume removal.  We held it yesterday.  He is on the EEG now, no evidence of neurologic function other than at the brainstem.    Objective       Physical Exam  Constitutional:    Intubated, sedated  HENT:      ETT tube in place  Eyes:        Pupils: Pupils are equal, round, and reactive to light.   Cardiovascular:   Tachycardia  Pulmonary:      Breath sounds: Clear anteriorly on the vent  Abdominal:      Comments: Soft, NT/ND, no masses, PD catheter in place and covered.  Genitourinary:     Comments: No whittington  Musculoskeletal:      Minimal edema  Skin:     General: Skin is warm and dry.   Neurological:   Intubated, sedated  Psychiatric:        Meds    Current Facility-Administered Medications:     acetaminophen (Tylenol) oral liquid 650 mg, 650 mg, oral, q4h PRN, 650 mg at 11/26/23 0418 **OR** acetaminophen (Tylenol) tablet 650 mg, 650 mg, oral, q4h PRN, ABDELRAHMAN Marrero DNP    D5 % and 0.9 % sodium chloride infusion, 30 mL/hr, intravenous, Continuous, ABDELRAHMAN Levy DNP    dextrose 10 % in water (D10W) infusion, 0.3 g/kg/hr, intravenous, Once PRN, Jensen Fonseca PA-C    dextrose 50 % injection 25 g, 25 g, intravenous, q15 min PRN, Jensen Fonseca PA-C    EPINEPHrine (Adrenalin) 4 mg in dextrose 5%  250 mL (16 mcg/mL) infusion (premix), 0.01-1 mcg/kg/min, intravenous, Continuous, Jensen Fonseca PA-C, Stopped at 11/24/23 0730    [START ON 11/27/2023] epoetin donna-epbx (Retacrit) injection 10,000 Units, 10,000 Units, subcutaneous, Once per day on Mon Thu, Miller Mccurdy MD    glucagon (Glucagen) injection 1 mg, 1 mg, intramuscular, q15 min PRN, Jensen Fonseca PA-C    heparin (porcine) injection 5,000 Units, 5,000 Units, subcutaneous, q8h, Jensen Fonseca PA-C, 5,000 Units at 11/26/23 0851    heparin flush 10 unit/mL syringe 12 Units, 12 Units, intra-catheter, PRN, Adeola Taylor, APRN-CNP, DNP    insulin lispro (HumaLOG) injection 0-5 Units, 0-5 Units, subcutaneous, q4h, Jensen Fonseca PA-C    levETIRAcetam in NaCl (iso-os) (Keppra)  mg, 500 mg, intravenous, q12h, Nancy TRUJILLO Corrine, APRN-CNP, DNP    norepinephrine (Levophed) 8 mg in dextrose 5% 250 mL (0.032 mg/mL) infusion (premix), 0.01-0.5 mcg/kg/min, intravenous, Continuous, Jensen Fonseca PA-C, Stopped at 11/24/23 0840    oxygen (O2) therapy, , inhalation, Continuous PRN - O2/gases, Nikita Nair MD, Rate Verify at 11/26/23 0745    PrismaSol BGK 2/3.5 CRRT solution, 30 mL/kg/hr, CRRT, Continuous, Miller Mccurdy MD, Last Rate: 2,712 mL/hr at 11/25/23 0730, 30 mL/kg/hr at 11/25/23 0730    propofol (Diprivan) infusion, 5-50 mcg/kg/min, intravenous, Continuous, Yosi Rodriguez DO, Last Rate: 27.1 mL/hr at 11/26/23 0851, 50 mcg/kg/min at 11/26/23 0851    vasopressin (Vasostrict) 0.2 unit/mL infusion, 0.03 Units/min, intravenous, Continuous, Jensen Fonseca PA-C, Stopped at 11/24/23 0840   Medications Discontinued During This Encounter   Medication Reason    heparin (porcine) 10,000 units/ 20 mL (500 units/mL) infusion     PrismaSol BGK 2/3.5 CRRT solution     heparin (porcine) injection 1,000 Units     calcium chloride 0.5 g in dextrose 5 % in water (D5W) 50 mL IV     oxygen (O2) therapy     PrismaSol BGK 2/3.5 CRRT solution          Allergies  No Known Allergies     Last Recorded Vitals  Blood pressure 127/51, pulse 82, temperature 37.9 °C (100.2 °F), temperature source Esophageal, resp. rate 24, weight 82.4 kg (181 lb 10.5 oz), SpO2 98 %.  Intake/Output last 3 Shifts:  I/O last 3 completed shifts:  In: 741.4 (9 mL/kg) [I.V.:651.4 (7.9 mL/kg); NG/GT:90]  Out: 3492 (42.4 mL/kg) [Urine:10 (0 mL/kg/hr); Emesis/NG output:1700; Other:1782]  Weight: 82.4 kg     Last 24 hour Results  Results for orders placed or performed during the hospital encounter of 11/23/23 (from the past 24 hour(s))   POCT GLUCOSE   Result Value Ref Range    POCT Glucose 82 74 - 99 mg/dL   POCT GLUCOSE   Result Value Ref Range    POCT Glucose 61 (L) 74 - 99 mg/dL   POCT GLUCOSE   Result Value Ref Range    POCT Glucose 84 74 - 99 mg/dL   POCT GLUCOSE   Result Value Ref Range    POCT Glucose 71 (L) 74 - 99 mg/dL   POCT GLUCOSE   Result Value Ref Range    POCT Glucose 71 (L) 74 - 99 mg/dL   POCT GLUCOSE   Result Value Ref Range    POCT Glucose 96 74 - 99 mg/dL   CBC and Auto Differential   Result Value Ref Range    WBC 5.9 4.4 - 11.3 x10*3/uL    nRBC 0.8 (H) 0.0 - 0.0 /100 WBCs    RBC 2.77 (L) 4.50 - 5.90 x10*6/uL    Hemoglobin 7.9 (L) 13.5 - 17.5 g/dL    Hematocrit 25.3 (L) 41.0 - 52.0 %    MCV 91 80 - 100 fL    MCH 28.5 26.0 - 34.0 pg    MCHC 31.2 (L) 32.0 - 36.0 g/dL    RDW 15.7 (H) 11.5 - 14.5 %    Platelets 143 (L) 150 - 450 x10*3/uL    Immature Granulocytes %, Automated 0.5 0.0 - 0.9 %    Immature Granulocytes Absolute, Automated 0.03 0.00 - 0.50 x10*3/uL   Renal Function Panel   Result Value Ref Range    Glucose 90 74 - 99 mg/dL    Sodium 135 (L) 136 - 145 mmol/L    Potassium 3.4 (L) 3.5 - 5.3 mmol/L    Chloride 98 98 - 107 mmol/L    Bicarbonate 25 21 - 32 mmol/L    Anion Gap 15 10 - 20 mmol/L    Urea Nitrogen 32 (H) 6 - 23 mg/dL    Creatinine 4.90 (H) 0.50 - 1.30 mg/dL    eGFR 12 (L) >60 mL/min/1.73m*2    Calcium 8.4 (L) 8.6 - 10.3 mg/dL    Phosphorus 3.4 2.5 - 4.9  mg/dL    Albumin 2.6 (L) 3.4 - 5.0 g/dL   Manual Differential   Result Value Ref Range    Neutrophils %, Manual 56.0 40.0 - 80.0 %    Bands %, Manual 28.0 0.0 - 5.0 %    Lymphocytes %, Manual 9.0 13.0 - 44.0 %    Monocytes %, Manual 7.0 2.0 - 10.0 %    Eosinophils %, Manual 0.0 0.0 - 6.0 %    Basophils %, Manual 0.0 0.0 - 2.0 %    Seg Neutrophils Absolute, Manual 3.30 1.60 - 5.00 x10*3/uL    Bands Absolute, Manual 1.65 (H) 0.00 - 0.50 x10*3/uL    Lymphocytes Absolute, Manual 0.53 (L) 0.80 - 3.00 x10*3/uL    Monocytes Absolute, Manual 0.41 0.05 - 0.80 x10*3/uL    Eosinophils Absolute, Manual 0.00 0.00 - 0.40 x10*3/uL    Basophils Absolute, Manual 0.00 0.00 - 0.10 x10*3/uL    Total Cells Counted 100     Neutrophils Absolute, Manual 4.95 1.60 - 5.50 x10*3/uL    RBC Morphology See Below     Polychromasia Mild     Target Cells Few     Ovalocytes Few     Teardrop Cells Few    POCT GLUCOSE   Result Value Ref Range    POCT Glucose 86 74 - 99 mg/dL   POCT GLUCOSE   Result Value Ref Range    POCT Glucose 87 74 - 99 mg/dL        Imaging results  XR chest 1 view    Result Date: 11/23/2023  Interpreted By:  Shannon Thorne, STUDY: XR CHEST 1 VIEW;  11/23/2023 11:31 am   INDICATION: Signs/Symptoms:post CVC placement.   COMPARISON: 9:56 a.m. the same day   ACCESSION NUMBER(S): [id number]   ORDERING CLINICIAN: CHERIE HOOD   FINDINGS: Artifact from overlying monitoring leads. Interval placement of left jugular central line with tip overlying the distal brachiocephalic vein. ET tube remains in place with tip approximate 4.3 cm above the edgardo. NG tube remains in place extending into the upper abdomen, tip not included on the image. Bipolar left subclavian pacer wires remain in place. Hazy bilateral infiltrates again seen. No definite pleural effusion or pneumothorax. The cardiac silhouette is prominent without change.       Interval placement of left jugular central line as above. No pneumothorax. Persistent bilateral  infiltrates/edema.   MACRO: None.   Signed by: Shannon Thorne 11/23/2023 11:43 AM Dictation workstation:   [id number]    XR chest 1 view    Result Date: 11/23/2023  Interpreted By:  Roni Bragg, STUDY: XR CHEST 1 VIEW;  11/23/2023 10:01 am   INDICATION: Signs/Symptoms:post arrest.   COMPARISON: 11/08/2023   ACCESSION NUMBER(S): [id number]   ORDERING CLINICIAN: CHERIE HOOD   FINDINGS: AP portable view of the chest is obtained.  Limited exam due to portable nature. Magnified cardiac silhouette. Endotracheal tube terminates above the edgardo. Cardiac pacer. Perihilar and diffuse ground-glass infiltrates, worse since the prior exam.       Cardiomegaly with diffuse large and dense infiltrates bilaterally especially in the perihilar lungs.   MACRO: None   Signed by: Roni Bragg 11/23/2023 10:22 AM Dictation workstation:   [id number]    ECG 12 lead    Result Date: 11/17/2023  Atrial-sensed ventricular-paced rhythm Abnormal ECG When compared with ECG of 08-NOV-2023 10:52, Vent. rate has decreased BY   5 BPM Confirmed by Luis Armando Warren ([id number]) on 11/17/2023 4:06:59 PM    CT angio coronary art with heartflow if score >30%    Result Date: 11/10/2023  Interpreted By:  Michelle Espitia, STUDY: CT ANGIO CORONARY ART WITH HEARTFLOW IF SCORE >30%;  11/10/2023 4:55 pm   INDICATION: Signs/Symptoms:Dyspnea, concern for UA.  .  There is need to define coronary anatomy.   COMPARISON: None.   ACCESSION NUMBER(S): [id number]   ORDERING CLINICIAN: ISAK CARRILLO   TECHNIQUE: Using multi-detector CT technology,  axial, sequential imaging with prospective gating was performed of the chest following the intravenous administration of contrast material.  A low-osmolar contrast agent was used ( 80 ml of Optiray 350).   The patient was premedicated with   mg i.v metoprolol and 0.4 mg sublingual nitroglycerin for heart rate control and coronary dilation, respectively.   For optimization of anatomic evaluation, multiplanar  reconstruction, maximum intensity projections, and advanced 3-D off-line postprocessing were performed on a dedicated stand-alone workstation under the direct supervision of the interpreting physician.   CT Dose-Length Product (DLP):    mGy/cm CT Dose Reduction Employed: Yes (Prospective triggering, iterative reconstruction)   FINDINGS: POTENTIAL STUDY LIMITATIONS:  None.   CORONARY ARTERIES:   CORONARY ANATOMY: There is normal origin of the coronary arteries.   LEFT MAIN CORONARY ARTERY: The left main is normal sized vessel that  trifurcates into LAD,circumflex artery and ramus intermedius. Small focus of calcification in the left main coronary artery causing no significant stenosis.   LEFT ANTERIOR DESCENDING ARTERY: The LAD is a normal size vessel that  wraps around the apex. It gives rise to  4 acute diagonal branches. Scattered calcified plaques in the proximal and mid LAD causing less than 50% stenosis. There is a calcified plaque in the proximal D1 causing less than 50% stenosis.   LEFT CIRCUMFLEX ARTERY: The LCX is a normal size vessel, which is  non-dominant. It gives rise to  2 obtuse marginal branches. There is no significant atherosclerotic change or stenotic disease.   RAMUS INTERMEDIUS: The ramus is a small sized vessel. There is no significant atherosclerotic change or stenotic disease.   RIGHT CORONARY ARTERY: The RCA is a normal size vessel, which is  dominant . It gives rise to a  conus branch,  balbir branch, and  2 acute marginal branches.  In its distal segment it bifurcates into the PDA and PV branch. Extensive atherosclerotic calcification throughout the proximal, mid and distal RC A. Assessment for degree of stenosis is not possible due to motion artifact and beam hardening artifact caused by pacer/defibrillator wires..   CARDIAC CHAMBERS: The cardiac chambers demonstrate normal atrioventricular and ventriculoarterial concordance, and systemic and pulmonary venous return.   Left ventricle and  left atrial enlargement   AORTIC VALVE: The aortic valve is  trileaflet in morphology.  Calcification of the aortic valve. Correlation for aortic valve stenosis.   MITRAL VALVE: There is moderate to severe mitral annulus/mitral valve calcification.   THORACIC AORTA: The visualized thoracic aorta is normal in course, caliber, and contour. There is no acute aortic pathology, such as dissection, intramural hematoma, or contained rupture. The aortic arch is not included on this examination.   PULMONARY ARTERY: Pulmonary artery is slightly dilated measuring 3.2 cm.   PERICARDIUM: There is no pericardial effusion of thickening.   CHEST: The chest wall is normal. No significant lymphadenopathy or mass is seen in limited images of the mediastinum. There is moderate bilateral pleural effusion with associated bibasilar atelectasis. Area of ground-glass opacity in the right upper lobe which might be due to atelectasis/edema or infectious infiltrate. No pleural effusion or pneumothorax.   UPPER ABDOMEN: Moderate amount of free fluid in the visualized portion of the upper abdomen. Small amount of free air in the upper abdomen.       1. Limited study due to motion artifact. 2. Within the limitations scattered calcified plaques are identified in proximal and mid LAD causing less than 50% stenosis. 3. No significant atherosclerotic calcification or stenosis in left main and CX. 4. Unable to assess RCA due to motion artifact and beam hardening artifact caused by pacer/defibrillator wires. 5. Left atrial and left ventricular enlargement. 6. Calcification of the aortic valve. Correlation with aortic valve stenosis. 7. Moderate sized bilateral pleural effusion with associated atelectasis. Mild interstitial pulmonary edema. An area of ground-glass airspace opacity in the right upper lobe which might be due to edema or developing infiltrate. Correlate clinically and with concern for infection. 8. Moderate amount of free fluid in the  upper abdomen with small amount of free air which may be due to peritoneal dialysis. Correlate clinically and correlate with concern for acute abdominal pathology.     MACRO: Findings were relayed to the ordering nurse practitioner Ms. Uribeine at 5:32 p.m., 11/10/2023 via [employer] secure chat.   Signed by: Michelle Truong 11/10/2023 5:41 PM Dictation workstation:   [id number]    XR chest 2 views    Result Date: 11/8/2023  Interpreted By:  Vinny Mckeon, STUDY: XR CHEST 2 VIEWS;  11/8/2023 12:57 pm   INDICATION: Signs/Symptoms:SOB.   COMPARISON: 04/02/2023   ACCESSION NUMBER(S): [id number]   ORDERING CLINICIAN: ROSA CANNON   FINDINGS: Pacemaker. Interstitial prominence with hazy opacification of the lungs. No apparent pleural effusion. Cardiomegaly. Aortic atherosclerosis.       Hazy opacification of the lungs perhaps related to edema or pneumonitis.     Signed by: Vinny Mckeon 11/8/2023 1:17 PM Dictation workstation:   [id number]       Assessment and Plan  Mr. Arzate is a 73-year-old man with a history of ESRD on peritoneal dialysis, heart failure, many admissions as of late.  I saw him this past Tuesday, he had lower extremity edema.  I explained to him that he will have issues with heart failure if he is not careful with his sodium intake, we also added 4.25% exchanges to improve ultrafiltration.  He did it on Tuesday and Wednesday, was feeling better.  He was to get another cycler machine which did not show up.  He became short of breath, unfortunately suffered a cardiac arrest, CPR, epinephrine, ROSC.  He is currently on norepinephrine, epinephrine, starting vasopressin.  Has a left IJ triple-lumen catheter.    We placed the temporary hemodialysis line and started CRRT.  But we held yesterday.  There is no evidence of neurologic function.  He is on the EEG.  He will get erythropoietin.  I spoke with family yesterday, I will talk to them on a daily basis.  This is a very unfortunate situation, I am afraid that  withdrawal will ultimately be necessary.      50 minutes in Mr. Arzate's care.  Miller Mccurdy MD

## 2023-11-26 NOTE — PROGRESS NOTES
HD # 4 for this 73 year old male who was admitted from the ED to the ICU in extremis after a cardiac arrest with a prolonged period of CPR.      This 72 YO male  was transported to  the ER in a private vehicle with complaints of shortness of breath after missing his regularly scheduled PD session secondary to not having a cycler machine, His last dialysis was this thought to be Tuesday. He has MMP including:  hypertension, ESRD on home peritoneal dialysis, CHF, pacemaker insertion, and multiple recent hospitalizations for CHF exacerbations. Reports or unclear however it appears that he became acutely short of breath the morning of admission and was transported to the ED in a private car, however could not walk to the triage area. He arrested in the parking lot Confirmation of arrest after transport to the ED Care (see timeline no CODE documentation in chart) appears to reveal that the code started at 0910 he was in the ED at 0915 (notation for arrival) the code ended at 1118 hrs. per report . During the code after documentation of asystole ACLS was started initial rhythm reported as ASYSTOLE. He was intubated but this was noted to require two ETT exchanges secondary to cuff rupture. He remained hypotensive after ROSC and required vasopressor agents (two). He was transferred to the ICU with low systolic BP's with a newly placed central line in his left IJ.          He remains ventilated unresponsive to verbal tactile or noxious stimuli. Appreciate application fo EEG monitoring as well as preliminary report of findings from EEG fellow this morning. Discusssed with epilepsy fellow reports gain confirmed this AM . GCS 3 = 1+ 1+ 1. Symmetric chest expansion on ventilator, Not tachycardic has been weaned of vasopressors, The ventilator is synchronized to SIMV he is breathing above the vent settings NO corneal reflexes noted      IMP S/P arrest of unspecified length grave neurologic signs.  Stat EEG ordered yesterday  revealed no seizures but generalized epileptiform discharges as PLEDS Await additional neurology input. Pacemaker details had been established yesterday it is unclear if MRI will add useful information. Devastating anoxic encephalopathy now day # 4 prognosis for meaningful recovery non existent   LABS:  CMP:  Results from last 7 days   Lab Units 11/26/23 0427 11/25/23  0108 11/24/23  0110 11/23/23  1825 11/23/23  1541 11/23/23  0946   SODIUM mmol/L 135* 137 137 140  --  140   POTASSIUM mmol/L 3.4* 3.8 4.3 4.6  --  5.5*   CHLORIDE mmol/L 98 99 99 98  --  97*   CO2 mmol/L 25 30 24 26  --  22   ANION GAP mmol/L 15 12 18 21*  --  27*   BUN mg/dL 32* 26* 49* 73* 81* 77*   CREATININE mg/dL 4.90* 4.49* 10.25* 15.29* 18.05* 17.77*   EGFR mL/min/1.73m*2 12* 13* 5* 3* 2* 2*   MAGNESIUM mg/dL  --   --   --   --   --  1.80   ALBUMIN g/dL 2.6*  --   --   --   --  2.7*   ALT U/L  --   --   --   --   --  111*   AST U/L  --   --   --   --   --  181*   BILIRUBIN TOTAL mg/dL  --   --   --   --   --  0.3     CBC:  Results from last 7 days   Lab Units 11/26/23 0427 11/25/23 0108 11/24/23  0441 11/24/23  0105 11/23/23  0946   WBC AUTO x10*3/uL 5.9 6.7 8.4  --  7.1   HEMOGLOBIN g/dL 7.9* 8.2* 8.3*  --  8.8*   HEMATOCRIT % 25.3* 25.6* 26.7*  --  30.0*   PLATELETS AUTO x10*3/uL 143* 132* 148* 159 176   MCV fL 91 91 93  --  99   This critically ill patient continues to be at-risk for clinically significant deterioration / failure due to the above mentioned dysfunctional, unstable organ systems.  I have personally identified and managed all complex critical care issues to prevent aforementioned clinical deterioration.  Critical care time is spent at bedside and/or the immediate area and has included, but is not limited to, the review of diagnostic tests, labs, radiographs, serial assessments of hemodynamics, respiratory status, ventilatory management, and family updates.  Time spent in procedures and teaching are reported separately.  CF    CRITICAL CARE TIME: minutes  35  Nikita Nair MD

## 2023-11-26 NOTE — SIGNIFICANT EVENT
"Preliminary EEG Report    This vEEG demonstrates generalized burst suppression which is indicative of severe diffuse post-anoxic encephalopathy. Bursts consists of frequent epileptiform discharges without clinical correlate. No seizures noted.    This EEG was read up until 00:36 on 11/26/23, which includes the first 25 minutes of EEG recording.     Please see the full report in the EEG database and \"Media\" tab tomorrow for the interpretation of the remainder of the recording.    When ready to discontinue this EEG, please enter the \"Discontinue Continuous Video EEG\" order.    Praveena Carrasco MD  Clinical Epilepsy Fellow    "

## 2023-11-27 NOTE — NURSING NOTE
Family of patient at bedside. Liz Kent NP having a goals of care conversation due to patient's worsening status. Family has agreed to make patient DNR-Comfort Care and no further escalation is to occur. Currently waiting on more family to arrive to compassionately extubate

## 2023-11-27 NOTE — SIGNIFICANT EVENT
Called to room for asystole.  Pt had been made comfort care earlier in the day due to overwhelming multisystem organ failure after cardiac arrest. Family present at bedside.  Patient had been compassionately extubated and all vasopressors stopped.    Asystole noted on monitor, arterial line without tracing, no clinical respirations or heart tones. No corneal reflexes.     Time of death 13:55    Preliminary cause of death  Anoxic brain injury   Acute CHF from volume overload  Chronic renal failure on peritoneal dialysis

## 2023-11-27 NOTE — NURSING NOTE
For [facility] purposes:     Patient was on these medications within the last hour before death:     Dextrose 10% in Water: 30cc/hr  Propofol: 30mcg/min or 16.27cc/hr   Norepinephrine: 0.5mcg/min or 84.8cc/hr  Vasopressin: 0.03 units/min or 9cc/hr

## 2023-11-27 NOTE — PROGRESS NOTES
Pedro Arzate is a 73 y.o. male on day 4 of admission presenting with Cardiac arrest (CMS/[facility]).    Subjective   Mr. Arzate is a 73-year-old man with a history of ESRD on peritoneal dialysis, heart failure, many admissions as of late.  I saw him this past Tuesday, he had lower extremity edema.  I explained to him that he will have issues with heart failure if he is not careful with his sodium intake, we also added 4.25% exchanges to improve ultrafiltration.  He did it on Tuesday and Wednesday, was feeling better.  He was to get another cycler machine which did not show up.  He became short of breath, unfortunately suffered a cardiac arrest, CPR, epinephrine, ROSC.      We started CRRT, as much for volume removal.  We held it on Saturday.  He was on EEG, no evidence of neurologic function.  But this morning at 5:30 AM, he suddenly desaturated.  Chest x-ray notes flash pulmonary edema.  Blood pressures are low again, he is on pressors.       Objective       Physical Exam  Constitutional:    Intubated, sedated  HENT:      ETT tube in place  Eyes:        Pupils: Pupils are equal, round, and reactive to light.   Cardiovascular:   Tachycardia  Pulmonary:      Breath sounds: Clear anteriorly on the vent  Abdominal:      Comments: Soft, NT/ND, no masses, PD catheter in place and covered.  Genitourinary:     Comments: No whittington  Musculoskeletal:      Minimal edema  Skin:     General: Skin is warm and dry.   Neurological:   Intubated, sedated  Psychiatric:        Meds    Current Facility-Administered Medications:     acetaminophen (Tylenol) oral liquid 650 mg, 650 mg, oral, q4h PRN, 650 mg at 11/26/23 2153 **OR** acetaminophen (Tylenol) tablet 650 mg, 650 mg, oral, q4h PRN, ABDELRAHMAN Marrero, DNP, 650 mg at 11/26/23 1508    D5 % and 0.9 % sodium chloride infusion, 30 mL/hr, intravenous, Continuous, ABDELRAHMAN Levy, DNP, Last Rate: 30 mL/hr at 11/27/23 0808, 30 mL/hr at 11/27/23 0808    dextrose 10 % in water  (D10W) infusion, 0.3 g/kg/hr, intravenous, Once PRN, Jensen Fonseca PA-C    dextrose 50 % injection 25 g, 25 g, intravenous, q15 min PRN, Jensen Fonseca PA-C    EPINEPHrine (Adrenalin) 4 mg in dextrose 5% 250 mL (16 mcg/mL) infusion (premix), 0.01-1 mcg/kg/min, intravenous, Continuous, Jensen Fonseca PA-C, Stopped at 11/24/23 0730    epoetin donna-epbx (Retacrit) injection 10,000 Units, 10,000 Units, subcutaneous, Once per day on Mon Thu, Miller Mccurdy MD    glucagon (Glucagen) injection 1 mg, 1 mg, intramuscular, q15 min PRN, Jensen Fonseca PA-C    heparin (porcine) injection 5,000 Units, 5,000 Units, subcutaneous, q8h, Jensen Fonseca PA-C, 5,000 Units at 11/27/23 0032    heparin flush 10 unit/mL syringe 12 Units, 12 Units, intra-catheter, PRN, Adeola Taylor, APRN-CNP, DNP    hydrocortisone sod succ (PF) (Solu-CORTEF) injection 50 mg, 50 mg, intravenous, q6h, Nancy H Corrine APRN-CNP, DNP, 50 mg at 11/27/23 0731    insulin lispro (HumaLOG) injection 0-5 Units, 0-5 Units, subcutaneous, q4h, Jensen Fonseca PA-C    ipratropium-albuteroL (Duo-Neb) 0.5-2.5 mg/3 mL nebulizer solution 3 mL, 3 mL, nebulization, q6h, Nancy H Corrine, APRN-CNP, DNP, 3 mL at 11/27/23 0417    lacosamide (Vimpat) 100 mg in dextrose 5 % in water (D5W) 100 mL IVPB, 100 mg, intravenous, q12h, Nikita Nair MD, Last Rate: 110 mL/hr at 11/27/23 0811, 100 mg at 11/27/23 0811    levETIRAcetam in NaCl (iso-os) (Keppra)  mg, 500 mg, intravenous, q12h, Nancy Castle, APRN-CNP, DNP, Stopped at 11/27/23 0319    norepinephrine (Levophed) 8 mg in dextrose 5% 250 mL (0.032 mg/mL) infusion (premix), 0.01-0.5 mcg/kg/min, intravenous, Continuous, Jensen Fonseca PA-C, Last Rate: 52.5 mL/hr at 11/27/23 0808, 0.31 mcg/kg/min at 11/27/23 0808    oxygen (O2) therapy, , inhalation, Continuous PRN - O2/gases, Nikita Nair MD, Rate Change at 11/27/23 0641    piperacillin-tazobactam-dextrose (Zosyn) IV 2.25 g, 2.25 g, intravenous,  q8h, Nancy Castle, APRN-CNP, DNP, Last Rate: 100 mL/hr at 11/27/23 0810, 2.25 g at 11/27/23 0810    PrismaSol BGK 2/3.5 CRRT solution, 30 mL/kg/hr, CRRT, Continuous, Miller Mccurdy MD, Last Rate: 2,712 mL/hr at 11/25/23 0730, 30 mL/kg/hr at 11/25/23 0730    propofol (Diprivan) infusion, 5-50 mcg/kg/min, intravenous, Continuous, Yosi Rodriguez DO, Last Rate: 16.27 mL/hr at 11/27/23 0808, 30 mcg/kg/min at 11/27/23 0808    vancomycin (Vancocin) in dextrose 5% 250 mL IV 1,250 mg, 1,250 mg, intravenous, Once, Roberta Comer PharmD, 1,250 mg at 11/27/23 0731    vancomycin (Vancocin) placeholder, , miscellaneous, Daily PRN, Roberta Comer, PharmD    vasopressin (Vasostrict) 0.2 unit/mL infusion, 0.03 Units/min, intravenous, Continuous, ABDELRAHMAN Marrero, DNP, Last Rate: 9 mL/hr at 11/27/23 0612, 0.03 Units/min at 11/27/23 0612   Medications Discontinued During This Encounter   Medication Reason    heparin (porcine) 10,000 units/ 20 mL (500 units/mL) infusion     PrismaSol BGK 2/3.5 CRRT solution     heparin (porcine) injection 1,000 Units     calcium chloride 0.5 g in dextrose 5 % in water (D5W) 50 mL IV     oxygen (O2) therapy     PrismaSol BGK 2/3.5 CRRT solution     lacosamide (Vimpat) injection 100 mg     lacosamide (Vimpat) injection 100 mg     vasopressin (Vasostrict) 0.2 unit/mL infusion     hydrocortisone sod succ (PF) (Solu-CORTEF) injection 50 mg         Allergies  No Known Allergies     Last Recorded Vitals  Blood pressure 127/51, pulse 86, temperature 36.9 °C (98.4 °F), temperature source Esophageal, resp. rate (!) 32, weight 82.4 kg (181 lb 10.5 oz), SpO2 91 %.  Intake/Output last 3 Shifts:  I/O last 3 completed shifts:  In: 1026 (12.5 mL/kg) [I.V.:886 (10.8 mL/kg); NG/GT:140]  Out: 1305 (15.8 mL/kg) [Urine:5 (0 mL/kg/hr); Emesis/NG output:1300]  Weight: 82.4 kg     Last 24 hour Results  Results for orders placed or performed during the hospital encounter of 11/23/23 (from the past 24 hour(s))    POCT GLUCOSE   Result Value Ref Range    POCT Glucose 88 74 - 99 mg/dL   POCT GLUCOSE   Result Value Ref Range    POCT Glucose 79 74 - 99 mg/dL   POCT GLUCOSE   Result Value Ref Range    POCT Glucose 71 (L) 74 - 99 mg/dL   POCT GLUCOSE   Result Value Ref Range    POCT Glucose 78 74 - 99 mg/dL   POCT GLUCOSE   Result Value Ref Range    POCT Glucose 73 (L) 74 - 99 mg/dL   CBC   Result Value Ref Range    WBC 3.2 (L) 4.4 - 11.3 x10*3/uL    nRBC 1.9 (H) 0.0 - 0.0 /100 WBCs    RBC 2.77 (L) 4.50 - 5.90 x10*6/uL    Hemoglobin 7.9 (L) 13.5 - 17.5 g/dL    Hematocrit 25.3 (L) 41.0 - 52.0 %    MCV 91 80 - 100 fL    MCH 28.5 26.0 - 34.0 pg    MCHC 31.2 (L) 32.0 - 36.0 g/dL    RDW 15.8 (H) 11.5 - 14.5 %    Platelets 105 (L) 150 - 450 x10*3/uL   Renal Function Panel   Result Value Ref Range    Glucose 80 74 - 99 mg/dL    Sodium 136 136 - 145 mmol/L    Potassium 3.6 3.5 - 5.3 mmol/L    Chloride 98 98 - 107 mmol/L    Bicarbonate 25 21 - 32 mmol/L    Anion Gap 17 10 - 20 mmol/L    Urea Nitrogen 47 (H) 6 - 23 mg/dL    Creatinine 6.42 (H) 0.50 - 1.30 mg/dL    eGFR 8 (L) >60 mL/min/1.73m*2    Calcium 7.3 (L) 8.6 - 10.3 mg/dL    Phosphorus 5.5 (H) 2.5 - 4.9 mg/dL    Albumin 2.4 (L) 3.4 - 5.0 g/dL   Blood Gas Arterial Full Panel   Result Value Ref Range    POCT pH, Arterial 7.37 (L) 7.38 - 7.42 pH    POCT pCO2, Arterial 42 38 - 42 mm Hg    POCT pO2, Arterial 68 (L) 85 - 95 mm Hg    POCT SO2, Arterial 94 94 - 100 %    POCT Oxy Hemoglobin, Arterial 92.3 (L) 94.0 - 98.0 %    POCT Hematocrit Calculated, Arterial 25.0 (L) 41.0 - 52.0 %    POCT Sodium, Arterial 134 (L) 136 - 145 mmol/L    POCT Potassium, Arterial 3.4 (L) 3.5 - 5.3 mmol/L    POCT Chloride, Arterial 102 98 - 107 mmol/L    POCT Ionized Calcium, Arterial 1.03 (L) 1.10 - 1.33 mmol/L    POCT Glucose, Arterial 73 (L) 74 - 99 mg/dL    POCT Lactate, Arterial 1.4 0.4 - 2.0 mmol/L    POCT Base Excess, Arterial -1.0 -2.0 - 3.0 mmol/L    POCT HCO3 Calculated, Arterial 24.3 22.0 - 26.0  mmol/L    POCT Hemoglobin, Arterial 8.4 (L) 13.5 - 17.5 g/dL    POCT Anion Gap, Arterial 11 10 - 25 mmo/L    Patient Temperature 37.0 degrees Celsius    FiO2 60 %    Ventilator Mode A/C     Ventilator Rate 12 bpm    Tidal Volume 500 mL    Peep CHM2O 10.0 cm H2O   POCT GLUCOSE   Result Value Ref Range    POCT Glucose 95 74 - 99 mg/dL        Imaging results  XR chest 1 view    Result Date: 11/23/2023  Interpreted By:  Shannon Thorne, STUDY: XR CHEST 1 VIEW;  11/23/2023 11:31 am   INDICATION: Signs/Symptoms:post CVC placement.   COMPARISON: 9:56 a.m. the same day   ACCESSION NUMBER(S): [id number]   ORDERING CLINICIAN: CHERIE HOOD   FINDINGS: Artifact from overlying monitoring leads. Interval placement of left jugular central line with tip overlying the distal brachiocephalic vein. ET tube remains in place with tip approximate 4.3 cm above the edgardo. NG tube remains in place extending into the upper abdomen, tip not included on the image. Bipolar left subclavian pacer wires remain in place. Hazy bilateral infiltrates again seen. No definite pleural effusion or pneumothorax. The cardiac silhouette is prominent without change.       Interval placement of left jugular central line as above. No pneumothorax. Persistent bilateral infiltrates/edema.   MACRO: None.   Signed by: Shannon Thorne 11/23/2023 11:43 AM Dictation workstation:   [id number]    XR chest 1 view    Result Date: 11/23/2023  Interpreted By:  Roni Bragg, STUDY: XR CHEST 1 VIEW;  11/23/2023 10:01 am   INDICATION: Signs/Symptoms:post arrest.   COMPARISON: 11/08/2023   ACCESSION NUMBER(S): [id number]   ORDERING CLINICIAN: CHERIE HOOD   FINDINGS: AP portable view of the chest is obtained.  Limited exam due to portable nature. Magnified cardiac silhouette. Endotracheal tube terminates above the edgardo. Cardiac pacer. Perihilar and diffuse ground-glass infiltrates, worse since the prior exam.       Cardiomegaly with diffuse large and dense infiltrates  bilaterally especially in the perihilar lungs.   MACRO: None   Signed by: Roni Bragg 11/23/2023 10:22 AM Dictation workstation:   [id number]    ECG 12 lead    Result Date: 11/17/2023  Atrial-sensed ventricular-paced rhythm Abnormal ECG When compared with ECG of 08-NOV-2023 10:52, Vent. rate has decreased BY   5 BPM Confirmed by Luis Armando Warren ([id number]) on 11/17/2023 4:06:59 PM    CT angio coronary art with heartflow if score >30%    Result Date: 11/10/2023  Interpreted By:  Michelle Espitia, STUDY: CT ANGIO CORONARY ART WITH HEARTFLOW IF SCORE >30%;  11/10/2023 4:55 pm   INDICATION: Signs/Symptoms:Dyspnea, concern for UA.  .  There is need to define coronary anatomy.   COMPARISON: None.   ACCESSION NUMBER(S): [id number]   ORDERING CLINICIAN: ISAK CARRILLO   TECHNIQUE: Using multi-detector CT technology,  axial, sequential imaging with prospective gating was performed of the chest following the intravenous administration of contrast material.  A low-osmolar contrast agent was used ( 80 ml of Optiray 350).   The patient was premedicated with   mg i.v metoprolol and 0.4 mg sublingual nitroglycerin for heart rate control and coronary dilation, respectively.   For optimization of anatomic evaluation, multiplanar reconstruction, maximum intensity projections, and advanced 3-D off-line postprocessing were performed on a dedicated stand-alone workstation under the direct supervision of the interpreting physician.   CT Dose-Length Product (DLP):    mGy/cm CT Dose Reduction Employed: Yes (Prospective triggering, iterative reconstruction)   FINDINGS: POTENTIAL STUDY LIMITATIONS:  None.   CORONARY ARTERIES:   CORONARY ANATOMY: There is normal origin of the coronary arteries.   LEFT MAIN CORONARY ARTERY: The left main is normal sized vessel that  trifurcates into LAD,circumflex artery and ramus intermedius. Small focus of calcification in the left main coronary artery causing no significant stenosis.   LEFT ANTERIOR  DESCENDING ARTERY: The LAD is a normal size vessel that  wraps around the apex. It gives rise to  4 acute diagonal branches. Scattered calcified plaques in the proximal and mid LAD causing less than 50% stenosis. There is a calcified plaque in the proximal D1 causing less than 50% stenosis.   LEFT CIRCUMFLEX ARTERY: The LCX is a normal size vessel, which is  non-dominant. It gives rise to  2 obtuse marginal branches. There is no significant atherosclerotic change or stenotic disease.   RAMUS INTERMEDIUS: The ramus is a small sized vessel. There is no significant atherosclerotic change or stenotic disease.   RIGHT CORONARY ARTERY: The RCA is a normal size vessel, which is  dominant . It gives rise to a  conus branch,  balbir branch, and  2 acute marginal branches.  In its distal segment it bifurcates into the PDA and PV branch. Extensive atherosclerotic calcification throughout the proximal, mid and distal RC A. Assessment for degree of stenosis is not possible due to motion artifact and beam hardening artifact caused by pacer/defibrillator wires..   CARDIAC CHAMBERS: The cardiac chambers demonstrate normal atrioventricular and ventriculoarterial concordance, and systemic and pulmonary venous return.   Left ventricle and left atrial enlargement   AORTIC VALVE: The aortic valve is  trileaflet in morphology.  Calcification of the aortic valve. Correlation for aortic valve stenosis.   MITRAL VALVE: There is moderate to severe mitral annulus/mitral valve calcification.   THORACIC AORTA: The visualized thoracic aorta is normal in course, caliber, and contour. There is no acute aortic pathology, such as dissection, intramural hematoma, or contained rupture. The aortic arch is not included on this examination.   PULMONARY ARTERY: Pulmonary artery is slightly dilated measuring 3.2 cm.   PERICARDIUM: There is no pericardial effusion of thickening.   CHEST: The chest wall is normal. No significant lymphadenopathy or mass is  seen in limited images of the mediastinum. There is moderate bilateral pleural effusion with associated bibasilar atelectasis. Area of ground-glass opacity in the right upper lobe which might be due to atelectasis/edema or infectious infiltrate. No pleural effusion or pneumothorax.   UPPER ABDOMEN: Moderate amount of free fluid in the visualized portion of the upper abdomen. Small amount of free air in the upper abdomen.       1. Limited study due to motion artifact. 2. Within the limitations scattered calcified plaques are identified in proximal and mid LAD causing less than 50% stenosis. 3. No significant atherosclerotic calcification or stenosis in left main and CX. 4. Unable to assess RCA due to motion artifact and beam hardening artifact caused by pacer/defibrillator wires. 5. Left atrial and left ventricular enlargement. 6. Calcification of the aortic valve. Correlation with aortic valve stenosis. 7. Moderate sized bilateral pleural effusion with associated atelectasis. Mild interstitial pulmonary edema. An area of ground-glass airspace opacity in the right upper lobe which might be due to edema or developing infiltrate. Correlate clinically and with concern for infection. 8. Moderate amount of free fluid in the upper abdomen with small amount of free air which may be due to peritoneal dialysis. Correlate clinically and correlate with concern for acute abdominal pathology.     MACRO: Findings were relayed to the ordering nurse practitioner Ms. Kamara at 5:32 p.m., 11/10/2023 via [employer] secure chat.   Signed by: Michelle Truong 11/10/2023 5:41 PM Dictation workstation:   [id number]    XR chest 2 views    Result Date: 11/8/2023  Interpreted By:  Vinny Mckeon, STUDY: XR CHEST 2 VIEWS;  11/8/2023 12:57 pm   INDICATION: Signs/Symptoms:SOB.   COMPARISON: 04/02/2023   ACCESSION NUMBER(S): [id number]   ORDERING CLINICIAN: ROSA CANNON   FINDINGS: Pacemaker. Interstitial prominence with hazy opacification of the  lungs. No apparent pleural effusion. Cardiomegaly. Aortic atherosclerosis.       Hazy opacification of the lungs perhaps related to edema or pneumonitis.     Signed by: Vinny Mckeon 11/8/2023 1:17 PM Dictation workstation:   [id number]       Assessment and Plan  Mr. Arzate is a 73-year-old man with a history of ESRD on peritoneal dialysis, heart failure, many admissions as of late.  I saw him this past Tuesday, he had lower extremity edema.  I explained to him that he will have issues with heart failure if he is not careful with his sodium intake, we also added 4.25% exchanges to improve ultrafiltration.  He did it on Tuesday and Wednesday, was feeling better.  He was to get another cycler machine which did not show up.  He became short of breath, unfortunately suffered a cardiac arrest, CPR, epinephrine, ROSC.  He is currently on norepinephrine, epinephrine, starting vasopressin.  Has a left IJ triple-lumen catheter.    We placed the temporary hemodialysis line and started CRRT.  But we held it on Saturday as we had unloaded him significantly.  FiO2 requirement was quite low.  There is no evidence of neurologic function.  He is on the EEG.  But I am afraid that he is suddenly decompensated this morning, he is now on 100% FiO2 requiring pressors.  Chest x-ray notes what looks to be pulmonary edema.    The question is whether this is cardiogenic shock or another etiology.  I am not sure if he became ischemic and developed acute diastolic dysfunction, similar to what seems to have brought him in.  I see a transthoracic echocardiogram from November 20 but I do not see a result.  We will have no choice but to start CRRT.  But I am afraid that withdrawal may ultimately be necessary.          50 minutes in Mr. Arzate's care.  Miller Mccurdy MD

## 2023-11-27 NOTE — CONSULTS
Vancomycin Dosing by Pharmacy- Cessation of Therapy    Consult to pharmacy for vancomycin dosing has been discontinued by the prescriber, pharmacy will sign off at this time.    Please call pharmacy if there are further questions or re-enter a consult if vancomycin is resumed.     Kandy Arzate [facility]

## 2023-11-27 NOTE — PROGRESS NOTES
Spiritual Care Visit    Clinical Encounter Type  Visited With: Family, Patient not available  Routine Visit: Introduction  Continue Visiting: No  Crisis Visit: Patient actively dying  Referral From: Verbal, Other (Comment) (Vocera call from Patient Liaison)  Referral To:     Taoist Encounters  Taoist Needs: Prayer    Patient Spiritual Care Encounters  Suffering Severity: Substantial  Fear Level: Mild  Feelings of Loneliness: Good  Feelings of Hopelessness: Good  Coping: Consistently demonstrated    Family Spiritual Care Encounters  Family Coping: Accepting, Sadness  Family Participation in Care: Consistently demonstrated  Family Support During Treatment: Consistently demonstrated     Assessment: The family of 3 daughters and wife were at the bedside of the pt. It was stated that they family understood that the pt was at the end of life. The was weeping when it was stated that the couple had been  for 40+ years. It was said that prayer was needed. The family was introduced to the  Roman [employer] and accepted her presence.   Outcome: After prayer and comforting words the family stated that they appreciated that someone was able to come and pray with the family.  Follow up:   There will be another visit scheduled if requested.     Chaplain Nathaniel Kirkland

## 2023-11-27 NOTE — DISCHARGE SUMMARY
Discharge Diagnosis  Cardiac arrest (CMS/[facility])    Issues Requiring Follow-Up  none    Test Results Pending At Discharge  Pending Labs       Order Current Status    BUN Collected (11/24/23 1329)    Creatinine, Serum Collected (11/24/23 1329)    Electrolyte panel Collected (11/24/23 1329)    Lactate Collected (11/23/23 1031)    Blood Gas Lactic Acid, Venous In process    C. difficile, PCR In process    Extra Tubes Preliminary result    Carter Top Preliminary result            Hospital Course   73 year old male who was admitted from the ED to the ICU in extremis after a cardiac arrest.      This 72 YO male  was transported to  the ER in a private vehicle with complaints of shortness of breath after missing his regularly scheduled PD session. He has MMP including:  hypertension, ESRD on home peritoneal dialysis, CHF, and multiple recent hospitalizations for CHF exacerbations. Upon on arrival he was discovered in full arrest when staff form the ED arrived. While in ICU he remained intubated d/t devastating anoxic encephalopathy, and worsening heart failure.     On the morning 11/27 he became profoundly hypoxic, hypotensive, and continuous EEG with minimal activity. Pt had been made comfort care earlier in the day due to overwhelming multisystem organ failure after cardiac arrest. Family present at bedside.  Patient had been compassionately extubated and all vasopressors stopped.     Asystole noted on monitor, arterial line without tracing, no clinical respirations or heart tones. No corneal reflexes.      Time of death 13:55     Preliminary cause of death  Anoxic brain injury   Acute CHF from volume overload  Chronic renal failure on peritoneal dialysis        Lisa Patel, APRN-CNP

## 2023-11-27 NOTE — PROGRESS NOTES
Pedro Arzate is a 73 y.o. male on day 4 of admission presenting with Cardiac arrest (CMS/HCC).          Patient  and was pronounced at 1355.

## 2023-11-27 NOTE — PROGRESS NOTES
"Vancomycin Dosing by Pharmacy- INITIAL    Pedro Arzate is a 73 y.o. year old male who Pharmacy has been consulted for vancomycin dosing for pneumonia. Based on the patient's indication and renal status this patient will be dosed based on a goal pre-HD level of 15-25.     Patient is ESRD; Was on PD at home prior to admission.  Switched to CVVH upon admission which was placed on hold on 11/26.  Appears tentative plan is to initiate Hemodialysis possibly today (will follow up).    Visit Vitals  /51 (Patient Position: Lying)   Pulse 84   Temp 37 °C (98.6 °F) (Esophageal)   Resp (!) 28        Lab Results   Component Value Date    CREATININE 6.42 (H) 11/27/2023    CREATININE 4.90 (H) 11/26/2023    CREATININE 4.49 (H) 11/25/2023    CREATININE 10.25 (H) 11/24/2023        Patient weight is No results found for: \"PTWEIGHT\"    No results found for: \"CULTURE\"     I/O last 3 completed shifts:  In: 1026 (12.5 mL/kg) [I.V.:886 (10.8 mL/kg); NG/GT:140]  Out: 1005 (12.2 mL/kg) [Urine:5 (0 mL/kg/hr); Emesis/NG output:1000]  Weight: 82.4 kg   [unfilled]    Lab Results   Component Value Date    PATIENTTEMP 37.0 11/27/2023    PATIENTTEMP 37.0 11/23/2023    PATIENTTEMP 37.0 11/23/2023          Assessment/Plan     Patient will be given a loading dose of 1250 mg.  Will initiate vancomycin maintenance based on Nephrology's plan for RRT    Follow-up level will be ordered on 11/28 at AM labs unless clinically indicated sooner.  Will continue to monitor renal function daily while on vancomycin and order serum creatinine at least every 48 hours if not already ordered.  Follow for continued vancomycin needs, clinical response, and signs/symptoms of toxicity.       Roberta Comer, PharmD       "

## 2023-11-28 LAB
ATRIAL RATE: 82 BPM
P AXIS: 270 DEGREES
P OFFSET: 184 MS
P ONSET: 114 MS
PR INTERVAL: 200 MS
Q ONSET: 214 MS
QRS COUNT: 14 BEATS
QRS DURATION: 186 MS
QT INTERVAL: 458 MS
QTC CALCULATION(BAZETT): 535 MS
QTC FREDERICIA: 508 MS
R AXIS: -58 DEGREES
T AXIS: 115 DEGREES
T OFFSET: 443 MS
VENTRICULAR RATE: 82 BPM

## 2023-12-05 PROCEDURE — 93005 ELECTROCARDIOGRAM TRACING: CPT

## 2023-12-13 LAB — HOLD SPECIMEN: NORMAL

## 2023-12-29 ENCOUNTER — APPOINTMENT (OUTPATIENT)
Dept: CARDIOLOGY | Facility: CLINIC | Age: 73
End: 2023-12-29
Payer: MEDICARE

## 2024-01-04 ENCOUNTER — APPOINTMENT (OUTPATIENT)
Dept: CARDIOLOGY | Facility: CLINIC | Age: 74
End: 2024-01-04
Payer: MEDICARE